# Patient Record
Sex: FEMALE
[De-identification: names, ages, dates, MRNs, and addresses within clinical notes are randomized per-mention and may not be internally consistent; named-entity substitution may affect disease eponyms.]

---

## 2020-05-02 ENCOUNTER — TRANSCRIPTION ENCOUNTER (OUTPATIENT)
Age: 65
End: 2020-05-02

## 2022-10-18 ENCOUNTER — EMERGENCY (EMERGENCY)
Facility: HOSPITAL | Age: 67
LOS: 1 days | Discharge: HOME | End: 2022-10-18
Attending: EMERGENCY MEDICINE
Payer: COMMERCIAL

## 2022-10-18 VITALS
HEART RATE: 99 BPM | SYSTOLIC BLOOD PRESSURE: 170 MMHG | TEMPERATURE: 98 F | DIASTOLIC BLOOD PRESSURE: 68 MMHG | RESPIRATION RATE: 16 BRPM | OXYGEN SATURATION: 100 %

## 2022-10-18 VITALS
DIASTOLIC BLOOD PRESSURE: 85 MMHG | HEART RATE: 110 BPM | HEIGHT: 62 IN | TEMPERATURE: 98 F | SYSTOLIC BLOOD PRESSURE: 123 MMHG | WEIGHT: 240.08 LBS | RESPIRATION RATE: 20 BRPM | OXYGEN SATURATION: 98 %

## 2022-10-18 PROCEDURE — 73070 X-RAY EXAM OF ELBOW: CPT

## 2022-10-18 PROCEDURE — 73590 X-RAY EXAM OF LOWER LEG: CPT

## 2022-10-18 PROCEDURE — 71250 CT THORAX DX C-: CPT | Mod: MA

## 2022-10-18 PROCEDURE — 73610 X-RAY EXAM OF ANKLE: CPT | Mod: 26,LT

## 2022-10-18 PROCEDURE — 73060 X-RAY EXAM OF HUMERUS: CPT

## 2022-10-18 PROCEDURE — 73610 X-RAY EXAM OF ANKLE: CPT

## 2022-10-18 PROCEDURE — 71250 CT THORAX DX C-: CPT | Mod: 26,MA

## 2022-10-18 PROCEDURE — 99284 EMERGENCY DEPT VISIT MOD MDM: CPT | Mod: 25

## 2022-10-18 PROCEDURE — 73060 X-RAY EXAM OF HUMERUS: CPT | Mod: 26,RT

## 2022-10-18 PROCEDURE — 73590 X-RAY EXAM OF LOWER LEG: CPT | Mod: 26,LT

## 2022-10-18 PROCEDURE — 73030 X-RAY EXAM OF SHOULDER: CPT | Mod: 26,RT

## 2022-10-18 PROCEDURE — 73070 X-RAY EXAM OF ELBOW: CPT | Mod: 26,RT

## 2022-10-18 PROCEDURE — 73030 X-RAY EXAM OF SHOULDER: CPT

## 2022-10-18 PROCEDURE — 99285 EMERGENCY DEPT VISIT HI MDM: CPT

## 2022-10-18 NOTE — ED PROVIDER NOTE - OBJECTIVE STATEMENT
66-year-old female past medical history of diabetes, hypertension, hyperlipidemia presents to the ED following a fall down 15 wooden stairs 8 days ago resulting in right proximal humeral fracture, left fibular fracture.  Patient was seen at [facility] after the fall.  Patient complaining now of worsening pain and her legs with numbness to bilateral lower extremities has been present since the fall.  Patient also having right lateral chest wall pain.  Denies fever, shortness of breath, chest pain, abdominal pain, bowel or bladder incontinence, back pain, neck pain, dysuria, nausea, vomiting, diarrhea. Pt not on AC. Patient had negative head CT at that time.

## 2022-10-18 NOTE — ED PROVIDER NOTE - NS ED ROS FT
GENERAL: No fever, chills  EYES: no vision changes, no discharge.   ENT: no difficulty swallowing or speaking   CARDIAC: no chest pain/pressure, SOB, lower extremity swelling  PULMONARY: no cough, SOB  GI: no abdominal pain, n/v/d  : no dysuria, no hematuria  SKIN: no rashes, no ecchymosis  NEURO: no headache, lightheadedness  MSK: + joint pain, myalgia, no weakness.

## 2022-10-18 NOTE — ED PROVIDER NOTE - PATIENT PORTAL LINK FT
You can access the FollowMyHealth Patient Portal offered by [facility] by registering at the following website: [email or web address]. By joining [employer]’s FollowMyHealth portal, you will also be able to view your health information using other applications (apps) compatible with our system.

## 2022-10-18 NOTE — ED PROVIDER NOTE - PHYSICAL EXAMINATION
GEN: Patient awake alert NAD.   HEENT: normocephalic, atraumatic, EOMI, no scleral icterus, moist MM  CARDIAC: RRR, S1, S2, no murmur. Right lateral chest wall tenderness  PULM: CTA B/L no wheeze, rhonchi, rales.   ABD: soft NT, ND, no rebound no guarding  MSK: Moving all extremities, no edema. 5/5 strength and full ROM in all extremities, Except right shoulder tenderness with ecchymosis and left lateral shin tenderness.    NEURO: A&Ox3, no focal neurological deficits  SKIN: Right lateral posterior shoulder ecchymosis.

## 2022-10-18 NOTE — ED PROVIDER NOTE - CARE PLAN
Principal Discharge DX:	Humerus fracture  Secondary Diagnosis:	Fibula fracture   1 Principal Discharge DX:	Humerus fracture  Goal:	right proximal humerus fracture  Secondary Diagnosis:	Fibula fracture

## 2022-10-18 NOTE — ED PROVIDER NOTE - CARE PROVIDER_API CALL
Christopher Antonio)  Orthopaedic Surgery  [address] [place] [address], [address]  [place] [address]  Phone: [phone]  Fax: [phone]  Follow Up Time: 7-10 Days

## 2022-10-18 NOTE — ED ADULT NURSE NOTE - CAS ELECT INFOMATION PROVIDED
Pt instructed to follow up with outpatient ortho, return to ED with worsening s/s. Pt verbalizes understanding./DC instructions

## 2022-10-18 NOTE — CONSULT NOTE ADULT - SUBJECTIVE AND OBJECTIVE BOX
Orthopaedic Surgery Consult Note    66yFemale c/o R shoulder pain  s/p fall down 15 stairs 10 days ago. Patient was initially seen at [facility] and found to have a R proximal humerus fracture treated non-op in a sling and a L distal fibula fracture made WBAT. She has been ambulating on LLE without difficulty since fall. Patient denies head hit or LOC. Patient denies numbness or tingling in the RUE. Patient denies any other injuries.    PMH:  DM (diabetes mellitus)    HTN (hypertension)    HLD (hyperlipidemia)      PSH:    AH:    Meds: See med rec    T(C): 36.5 (10-18-22 @ 19:09)  HR: 100 (10-18-22 @ 19:09)  BP: 165/69 (10-18-22 @ 19:09)  RR: 18 (10-18-22 @ 19:09)  SpO2: 100% (10-18-22 @ 19:09)  Wt(kg): --    Gen: NAD  Resp: Unlabored breathing  PE RUE:  Skin intact,    SILT axillary/med/rad/ulnar  +Motor AIN/PIN/Ulnar/Radial/Musc/Median,   +painless elbow/wrist ROM,   shoulder ROM limited 2/2 pain,   2+radial pulse, soft compartments.    Secondary:  Mildly TTP over L distal fibula, no increased pain with WB, no TTP over other bony landmarks, SILT BL, ROM intact BL, distal pulses palpable.    Imaging:  XR demonstrating right proximal humerus fracture    CT shows located glenohumeral joint    XR L ankle shows no widening of joint space

## 2022-10-18 NOTE — ED PROVIDER NOTE - NSFOLLOWUPINSTRUCTIONS_ED_ALL_ED_FT
You were seen in the ER for fall. Your Xray showed stable fractures. The orthopedic team evaluated you and advised that you follow up with Dr. Antonio in 1 week. Please do not use your injured arm. You can weight bear as tolerated on the leg.    Please follow up with Dr. Antonio in 1 week.  Please follow up with your primary care doctor.    Please return to the ER if you have worsening symptoms including fever, chest pain, shortness of breath, abdominal pain, nausea, vomiting, diarrhea, weakness or lightheadedness/fainting.

## 2022-10-18 NOTE — ED PROVIDER NOTE - PROGRESS NOTE DETAILS
John Hein, DO PGY-2: Spoke with Ortho about patient's fractures.  Would like dedicated ankle films of the left ankle.  Ortho will come see the patient to further evaluate fractures and set up patient with follow-up. Haily chadwick evaluated pt, cleared her for discharge. pt feels stable. discussed f/u with Dr. Antonio in 1w, pt agrees. return precautions given.

## 2022-10-18 NOTE — ED PROVIDER NOTE - ATTENDING CONTRIBUTION TO CARE
Tavon Hand MD, FACEP  The patient was serially evaluated throughout emergency department course by the team. There was no acute deterioration up to this time in the emergency department. The patient has demonstrated clinical improvement and/or stability, feels better at this time according to emergency department team. Agree with goals/plan of emergency department care as described in this physician's electronic medical record, including diagnostics, therapeutics and consultation recommendation as clinically warranted. Will discharge home with close outpatient follow up with primary care physician/provider and specialist if necessary. The patient and/or family was educated on expectant management and return precautions concerning signs and features to return to the emergency department, in layman terms, including but not limited to: nausea, vomiting, fever, chills, the inability to eat, take medications, or drink, persistent/worsening symptoms or any concerns at all. There are no acute or immediate life threatening issues present on history, clinical exam, or any diagnostic evaluation. The patient is a safe disposition home, has capacity and insight into their condition, is ambulatory in the Emergency Department with no further questions and will follow up with their doctor(s) this week. Diagnosis, prognosis, natural history and treatment was discussed with patient and/or family. The patient and/or family were given the opportunity to ask questions and have them answered in full. The patient and/or family are with capacity and insight into the situation, treatment, risks, benefits, alternative therapies, and understand that they can ask any further questions if needed. Patient and/or family/guardian understands anticipatory guidance and was given strict return and follow up precautions. The patient and/or family/guardian has been informed of the necessity to follow up with the PMD/Clinic/follow up as provided within 2-3 days, and the patient and/or family/guardian reports understanding of above with capacity and insight. The patient and/or family/guardian were informed of any results of their tests and are were encouraged to follow up on the findings with their doctor as well as the need to inform their doctor of any results. The patient and/or family/guardian are aware of the need to follow up with repeat testing as applicable and report understanding of the above with capacity and insight. The patient and/or family/guardian was made aware of any pending test results at the time of discharge and of the need to call back for the final results as well as the need to inform their doctor of the results.

## 2022-10-18 NOTE — ED PROVIDER NOTE - CLINICAL SUMMARY MEDICAL DECISION MAKING FREE TEXT BOX
John Hein,  PGY-2: 66-year-old female with past medical history of diabetes hypertension, hyperlipidemia presents to the ED complaining of worsening pain following a fall down 15 steps 8 days ago resulting in right humeral head fracture and left fibular fracture.  Patient now having right chest wall pain and has not been wearing her right arm sling effectively.  Will obtain CT chest to evaluate for rib fractures and italia-ray the right upper extremity and left lower extremity to make sure fractures are stable.

## 2023-03-01 PROBLEM — I10 ESSENTIAL (PRIMARY) HYPERTENSION: Chronic | Status: ACTIVE | Noted: 2022-10-18

## 2023-03-01 PROBLEM — E11.9 TYPE 2 DIABETES MELLITUS WITHOUT COMPLICATIONS: Chronic | Status: ACTIVE | Noted: 2022-10-18

## 2023-03-01 PROBLEM — E78.5 HYPERLIPIDEMIA, UNSPECIFIED: Chronic | Status: ACTIVE | Noted: 2022-10-18

## 2023-03-03 ENCOUNTER — APPOINTMENT (OUTPATIENT)
Dept: ORTHOPEDIC SURGERY | Facility: CLINIC | Age: 68
End: 2023-03-03

## 2023-03-03 PROBLEM — Z00.00 ENCOUNTER FOR PREVENTIVE HEALTH EXAMINATION: Status: ACTIVE | Noted: 2023-03-03

## 2023-03-20 ENCOUNTER — APPOINTMENT (OUTPATIENT)
Dept: ORTHOPEDIC SURGERY | Facility: CLINIC | Age: 68
End: 2023-03-20
Payer: COMMERCIAL

## 2023-03-20 VITALS
WEIGHT: 240 LBS | BODY MASS INDEX: 45.31 KG/M2 | HEART RATE: 102 BPM | SYSTOLIC BLOOD PRESSURE: 183 MMHG | DIASTOLIC BLOOD PRESSURE: 80 MMHG | OXYGEN SATURATION: 98 % | TEMPERATURE: 97.4 F | HEIGHT: 61 IN

## 2023-03-20 DIAGNOSIS — M47.816 SPONDYLOSIS W/OUT MYELOPATHY OR RADICULOPATHY, LUMBAR REGION: ICD-10-CM

## 2023-03-20 DIAGNOSIS — R29.898 OTHER SYMPTOMS AND SIGNS INVOLVING THE MUSCULOSKELETAL SYSTEM: ICD-10-CM

## 2023-03-20 PROCEDURE — 73521 X-RAY EXAM HIPS BI 2 VIEWS: CPT

## 2023-03-20 PROCEDURE — 72080 X-RAY EXAM THORACOLMB 2/> VW: CPT

## 2023-03-20 PROCEDURE — 99204 OFFICE O/P NEW MOD 45 MIN: CPT

## 2023-03-20 RX ORDER — CLOPIDOGREL 75 MG/1
TABLET, FILM COATED ORAL
Refills: 0 | Status: ACTIVE | COMMUNITY

## 2023-03-20 RX ORDER — INSULIN LISPRO 100 [IU]/ML
INJECTION, SOLUTION INTRAVENOUS; SUBCUTANEOUS
Refills: 0 | Status: ACTIVE | COMMUNITY

## 2023-03-20 RX ORDER — LOSARTAN POTASSIUM 100 MG/1
TABLET, FILM COATED ORAL
Refills: 0 | Status: ACTIVE | COMMUNITY

## 2023-03-20 RX ORDER — INSULIN GLARGINE 100 [IU]/ML
INJECTION, SOLUTION SUBCUTANEOUS
Refills: 0 | Status: ACTIVE | COMMUNITY

## 2023-03-20 RX ORDER — CARVEDILOL 12.5 MG/1
12.5 TABLET, FILM COATED ORAL
Refills: 0 | Status: ACTIVE | COMMUNITY

## 2023-03-20 NOTE — PHYSICAL EXAM
[Slightly Antalgic] : slightly antalgic [Walker] : ambulates with walker [1+] : left 1+ [Poor Appearance] : well-appearing [Acute Distress] : not in acute distress [Obese] : obese [de-identified] : The patient has no respiratory distress. Mood and affect are normal. The patient is alert and oriented to person, place and time.\par The patient has mild tenderness to the left of the midline in the lower lumbar spine.  She has lumbar spine flexion of 40 degrees limited by pain.  There is no muscle spasm.  There is no tenderness of cervical or thoracic spine.  Lower extremity neurologic exam demonstrates globally decreased sensation in both lower extremities.  Motor function is 5/5.  Deep tendon reflexes trace and equal at the knees and at the ankles.  She has venous stasis changes bilaterally.  She has healing abrasions bilaterally of her legs. [de-identified] : AP and lateral x-rays of the thoraco lumbar spine taken today demonstrate degenerative changes.  There are no fractures or dislocations.  AP and lateral x-rays of both hips taken today demonstrate no fracture or dislocation.  There are mild degenerative changes.\par MRI of the cervical spine January 16, 2023 demonstrates multilevel spondylosis without significant neural impingement\par MRI of the lumbar spine January 16, 2023 demonstrates multilevel spondylosis without significant neural impingement.\par X-rays of the right shoulder taken January 13, 2023 demonstrate healing fracture of the proximal right humerus

## 2023-03-20 NOTE — DISCUSSION/SUMMARY
[de-identified] : The patient has cervical spondylosis and lumbar spondylosis.  She has global lower extremity weakness.  I think she needs to be evaluated by the neurology service.  She will continue to use a walker as she has been falling multiple times.

## 2023-03-20 NOTE — HISTORY OF PRESENT ILLNESS
[de-identified] : 67 year old female presents for initial evaluation of bilateral lower extremity weakness and numbness for the past 5 months. She reports falling down stairs at the initial time of injury. She complains of mild pain but mostly weakness, N+T in the BLE, L>R, with inability to walk for prolonged periods. Her symptoms have been worsening since onset, and she has become more limited in the distances she is able to walk. She now walks with the assistance of a walker due to her leg weakness. She has been evaluated by Dr. Polo from [facility] and [facility] who ordered MRI of C + L spine, which demonstrated some mild stenosis but did not explain her weakness. She has also seen Dr. Ochoa who ordered an MRI of her T spine to rule out cord compression which has not yet been authorized by her insurance. She has not been seen for neuro evaluation as of yet.

## 2023-04-27 ENCOUNTER — APPOINTMENT (OUTPATIENT)
Dept: NEUROLOGY | Facility: CLINIC | Age: 68
End: 2023-04-27

## 2023-07-17 ENCOUNTER — APPOINTMENT (OUTPATIENT)
Dept: NEUROLOGY | Facility: CLINIC | Age: 68
End: 2023-07-17

## 2024-11-06 ENCOUNTER — APPOINTMENT (OUTPATIENT)
Dept: UROLOGY | Facility: CLINIC | Age: 69
End: 2024-11-06
Payer: MEDICARE

## 2024-11-06 ENCOUNTER — NON-APPOINTMENT (OUTPATIENT)
Age: 69
End: 2024-11-06

## 2024-11-06 VITALS — WEIGHT: 210 LBS | BODY MASS INDEX: 38.64 KG/M2 | HEIGHT: 62 IN

## 2024-11-06 DIAGNOSIS — N28.9 DISORDER OF KIDNEY AND URETER, UNSPECIFIED: ICD-10-CM

## 2024-11-06 DIAGNOSIS — Z78.9 OTHER SPECIFIED HEALTH STATUS: ICD-10-CM

## 2024-11-06 DIAGNOSIS — Z83.3 FAMILY HISTORY OF DIABETES MELLITUS: ICD-10-CM

## 2024-11-06 PROCEDURE — 99204 OFFICE O/P NEW MOD 45 MIN: CPT

## 2024-11-06 RX ORDER — NIFEDIPINE 30 MG/1
30 TABLET, EXTENDED RELEASE ORAL
Refills: 0 | Status: ACTIVE | COMMUNITY

## 2024-11-06 RX ORDER — EPOETIN ALFA 10000 [IU]/ML
10000 SOLUTION INTRAVENOUS; SUBCUTANEOUS
Refills: 0 | Status: ACTIVE | COMMUNITY

## 2024-11-06 RX ORDER — ACETAMINOPHEN 500 MG/1
TABLET ORAL
Refills: 0 | Status: ACTIVE | COMMUNITY

## 2024-11-06 RX ORDER — BUMETANIDE 2 MG/1
2 TABLET ORAL
Refills: 0 | Status: ACTIVE | COMMUNITY

## 2024-11-07 PROBLEM — N28.9 KIDNEY LESION: Status: ACTIVE | Noted: 2024-11-07

## 2024-11-08 ENCOUNTER — NON-APPOINTMENT (OUTPATIENT)
Age: 69
End: 2024-11-08

## 2024-11-14 ENCOUNTER — APPOINTMENT (OUTPATIENT)
Dept: UROLOGY | Facility: CLINIC | Age: 69
End: 2024-11-14
Payer: MEDICARE

## 2024-11-14 VITALS
SYSTOLIC BLOOD PRESSURE: 180 MMHG | BODY MASS INDEX: 38.64 KG/M2 | HEIGHT: 62 IN | WEIGHT: 210 LBS | DIASTOLIC BLOOD PRESSURE: 76 MMHG | RESPIRATION RATE: 16 BRPM | OXYGEN SATURATION: 99 % | TEMPERATURE: 97.6 F | HEART RATE: 76 BPM

## 2024-11-14 DIAGNOSIS — N28.89 OTHER SPECIFIED DISORDERS OF KIDNEY AND URETER: ICD-10-CM

## 2024-11-14 DIAGNOSIS — R19.00 INTRA-ABDOMINAL AND PELVIC SWELLING, MASS AND LUMP, UNSPECIFIED SITE: ICD-10-CM

## 2024-11-14 PROCEDURE — 99215 OFFICE O/P EST HI 40 MIN: CPT

## 2024-11-14 PROCEDURE — G2211 COMPLEX E/M VISIT ADD ON: CPT

## 2024-11-27 ENCOUNTER — APPOINTMENT (OUTPATIENT)
Dept: UROLOGY | Facility: CLINIC | Age: 69
End: 2024-11-27

## 2024-12-10 ENCOUNTER — APPOINTMENT (OUTPATIENT)
Dept: SURGERY | Facility: CLINIC | Age: 69
End: 2024-12-10
Payer: MEDICARE

## 2024-12-10 VITALS
BODY MASS INDEX: 38.28 KG/M2 | SYSTOLIC BLOOD PRESSURE: 140 MMHG | HEIGHT: 62 IN | WEIGHT: 208 LBS | DIASTOLIC BLOOD PRESSURE: 68 MMHG | HEART RATE: 86 BPM | OXYGEN SATURATION: 99 %

## 2024-12-10 DIAGNOSIS — R19.00 INTRA-ABDOMINAL AND PELVIC SWELLING, MASS AND LUMP, UNSPECIFIED SITE: ICD-10-CM

## 2024-12-10 DIAGNOSIS — E11.29 TYPE 2 DIABETES MELLITUS WITH OTHER DIABETIC KIDNEY COMPLICATION: ICD-10-CM

## 2024-12-10 DIAGNOSIS — Z86.79 PERSONAL HISTORY OF OTHER DISEASES OF THE CIRCULATORY SYSTEM: ICD-10-CM

## 2024-12-10 DIAGNOSIS — Z87.448 PERSONAL HISTORY OF OTHER DISEASES OF URINARY SYSTEM: ICD-10-CM

## 2024-12-10 PROCEDURE — 99205 OFFICE O/P NEW HI 60 MIN: CPT

## 2024-12-12 ENCOUNTER — RESULT REVIEW (OUTPATIENT)
Age: 69
End: 2024-12-12

## 2024-12-12 ENCOUNTER — OUTPATIENT (OUTPATIENT)
Dept: OUTPATIENT SERVICES | Facility: HOSPITAL | Age: 69
LOS: 1 days | End: 2024-12-12
Payer: MEDICARE

## 2024-12-12 DIAGNOSIS — N28.89 OTHER SPECIFIED DISORDERS OF KIDNEY AND URETER: ICD-10-CM

## 2024-12-12 DIAGNOSIS — R19.00 INTRA-ABDOMINAL AND PELVIC SWELLING, MASS AND LUMP, UNSPECIFIED SITE: ICD-10-CM

## 2024-12-12 DIAGNOSIS — Z00.8 ENCOUNTER FOR OTHER GENERAL EXAMINATION: ICD-10-CM

## 2024-12-12 LAB — GLUCOSE BLDC GLUCOMTR-MCNC: 72 MG/DL — SIGNIFICANT CHANGE UP (ref 70–99)

## 2024-12-12 PROCEDURE — 82962 GLUCOSE BLOOD TEST: CPT

## 2024-12-12 PROCEDURE — 78815 PET IMAGE W/CT SKULL-THIGH: CPT | Mod: PI

## 2024-12-12 PROCEDURE — 78815 PET IMAGE W/CT SKULL-THIGH: CPT | Mod: 26,PI,MH

## 2024-12-12 PROCEDURE — A9552: CPT

## 2024-12-13 ENCOUNTER — NON-APPOINTMENT (OUTPATIENT)
Age: 69
End: 2024-12-13

## 2024-12-13 DIAGNOSIS — N28.89 OTHER SPECIFIED DISORDERS OF KIDNEY AND URETER: ICD-10-CM

## 2024-12-13 DIAGNOSIS — R19.00 INTRA-ABDOMINAL AND PELVIC SWELLING, MASS AND LUMP, UNSPECIFIED SITE: ICD-10-CM

## 2024-12-20 ENCOUNTER — OUTPATIENT (OUTPATIENT)
Dept: OUTPATIENT SERVICES | Facility: HOSPITAL | Age: 69
LOS: 1 days | End: 2024-12-20
Payer: MEDICARE

## 2024-12-20 VITALS
HEIGHT: 62 IN | TEMPERATURE: 98 F | HEART RATE: 79 BPM | DIASTOLIC BLOOD PRESSURE: 75 MMHG | RESPIRATION RATE: 17 BRPM | SYSTOLIC BLOOD PRESSURE: 134 MMHG | WEIGHT: 207.01 LBS | OXYGEN SATURATION: 100 %

## 2024-12-20 DIAGNOSIS — Z95.5 PRESENCE OF CORONARY ANGIOPLASTY IMPLANT AND GRAFT: Chronic | ICD-10-CM

## 2024-12-20 DIAGNOSIS — Z98.891 HISTORY OF UTERINE SCAR FROM PREVIOUS SURGERY: Chronic | ICD-10-CM

## 2024-12-20 DIAGNOSIS — Z01.818 ENCOUNTER FOR OTHER PREPROCEDURAL EXAMINATION: ICD-10-CM

## 2024-12-20 DIAGNOSIS — Z98.890 OTHER SPECIFIED POSTPROCEDURAL STATES: Chronic | ICD-10-CM

## 2024-12-20 DIAGNOSIS — R19.00 INTRA-ABDOMINAL AND PELVIC SWELLING, MASS AND LUMP, UNSPECIFIED SITE: ICD-10-CM

## 2024-12-20 LAB
A1C WITH ESTIMATED AVERAGE GLUCOSE RESULT: 5.5 % — SIGNIFICANT CHANGE UP (ref 4–5.6)
ALBUMIN SERPL ELPH-MCNC: 4 G/DL — SIGNIFICANT CHANGE UP (ref 3.5–5.2)
ALP SERPL-CCNC: 81 U/L — SIGNIFICANT CHANGE UP (ref 30–115)
ALT FLD-CCNC: <5 U/L — SIGNIFICANT CHANGE UP (ref 0–41)
ANION GAP SERPL CALC-SCNC: 16 MMOL/L — HIGH (ref 7–14)
APTT BLD: 33.1 SEC — SIGNIFICANT CHANGE UP (ref 27–39.2)
AST SERPL-CCNC: 10 U/L — SIGNIFICANT CHANGE UP (ref 0–41)
BILIRUB SERPL-MCNC: 0.3 MG/DL — SIGNIFICANT CHANGE UP (ref 0.2–1.2)
BUN SERPL-MCNC: 91 MG/DL — CRITICAL HIGH (ref 10–20)
CALCIUM SERPL-MCNC: 8.6 MG/DL — SIGNIFICANT CHANGE UP (ref 8.4–10.5)
CHLORIDE SERPL-SCNC: 109 MMOL/L — SIGNIFICANT CHANGE UP (ref 98–110)
CO2 SERPL-SCNC: 10 MMOL/L — LOW (ref 17–32)
CREAT SERPL-MCNC: 6.6 MG/DL — CRITICAL HIGH (ref 0.7–1.5)
EGFR: 6 ML/MIN/1.73M2 — LOW
ESTIMATED AVERAGE GLUCOSE: 111 MG/DL — SIGNIFICANT CHANGE UP (ref 68–114)
GLUCOSE SERPL-MCNC: 93 MG/DL — SIGNIFICANT CHANGE UP (ref 70–99)
HCT VFR BLD CALC: 40.1 % — SIGNIFICANT CHANGE UP (ref 37–47)
HGB BLD-MCNC: 12.1 G/DL — SIGNIFICANT CHANGE UP (ref 12–16)
INR BLD: 1.13 RATIO — SIGNIFICANT CHANGE UP (ref 0.65–1.3)
MCHC RBC-ENTMCNC: 24.8 PG — LOW (ref 27–31)
MCHC RBC-ENTMCNC: 30.2 G/DL — LOW (ref 32–37)
MCV RBC AUTO: 82.2 FL — SIGNIFICANT CHANGE UP (ref 81–99)
NRBC # BLD: 0 /100 WBCS — SIGNIFICANT CHANGE UP (ref 0–0)
PLATELET # BLD AUTO: 289 K/UL — SIGNIFICANT CHANGE UP (ref 130–400)
PMV BLD: 11 FL — HIGH (ref 7.4–10.4)
POTASSIUM SERPL-MCNC: 5.7 MMOL/L — HIGH (ref 3.5–5)
POTASSIUM SERPL-SCNC: 5.7 MMOL/L — HIGH (ref 3.5–5)
PROT SERPL-MCNC: 7.5 G/DL — SIGNIFICANT CHANGE UP (ref 6–8)
PROTHROM AB SERPL-ACNC: 13.4 SEC — HIGH (ref 9.95–12.87)
RBC # BLD: 4.88 M/UL — SIGNIFICANT CHANGE UP (ref 4.2–5.4)
RBC # FLD: 17.4 % — HIGH (ref 11.5–14.5)
SODIUM SERPL-SCNC: 135 MMOL/L — SIGNIFICANT CHANGE UP (ref 135–146)
WBC # BLD: 6.88 K/UL — SIGNIFICANT CHANGE UP (ref 4.8–10.8)
WBC # FLD AUTO: 6.88 K/UL — SIGNIFICANT CHANGE UP (ref 4.8–10.8)

## 2024-12-20 PROCEDURE — 99214 OFFICE O/P EST MOD 30 MIN: CPT | Mod: 25

## 2024-12-20 PROCEDURE — 83036 HEMOGLOBIN GLYCOSYLATED A1C: CPT

## 2024-12-20 PROCEDURE — 93010 ELECTROCARDIOGRAM REPORT: CPT

## 2024-12-20 PROCEDURE — 85730 THROMBOPLASTIN TIME PARTIAL: CPT

## 2024-12-20 PROCEDURE — 85610 PROTHROMBIN TIME: CPT

## 2024-12-20 PROCEDURE — 80053 COMPREHEN METABOLIC PANEL: CPT

## 2024-12-20 PROCEDURE — 93005 ELECTROCARDIOGRAM TRACING: CPT

## 2024-12-20 PROCEDURE — 36415 COLL VENOUS BLD VENIPUNCTURE: CPT

## 2024-12-20 PROCEDURE — 85027 COMPLETE CBC AUTOMATED: CPT

## 2024-12-20 NOTE — H&P PST ADULT - ENMT COMMENTS
Massage Therapy Progress Note      Length of treatment: 30-minute    Authorization: Patient request    Reviewed contraindications/current health status with Patient    No Contraindications to massage therapy exist    Subjective:  Patient complains of:  No complaints today.    Pain report prior to treatment:  Patient gave no response    Type of treatment requested: Therapeutic massage    Specific requests:  Upper body face down worked her whole back and gluteus, and while face up did her arms, neck and shoulders.    Objective Observations:  Hypertonicity noted in:  Cervical paraspinals Bilateral, Upper trapezius Bilateral, Lumbar paraspinals Bilateral, Quadratus lumborum Bilateral and Gluteus medius Bilateral, rhomboids bilateral.    Hypotonicity/Ischemia noted in: none noted.    Decreased ROM noted in none noted.    Additional observations:  Rounded shoulders and pronounced kyphosis and lordosis.    Assessment:  Patient received Swedish techniques to affected tissues.    Additional treatment provided:  Therapist applied warm towel to patients back concluding massage.  Therapist provided a warm neck roll while patient lay face up.  therapist provided patient a warm towel and a cup of water at the conclusion of massage session.    Response to treatment: Patient relaxed easily    Pain report after treatment:Patient gave no response    Education/Resources: None provided this visit    Plan/Recommendations:  Massage therapy as desired  Session concluded   III airway/ denies loose or removable teeth

## 2024-12-20 NOTE — H&P PST ADULT - NSICDXPASTMEDICALHX_GEN_ALL_CORE_FT
PAST MEDICAL HISTORY:  DM (diabetes mellitus)     HLD (hyperlipidemia)     HTN (hypertension)     Obese

## 2024-12-21 DIAGNOSIS — Z01.818 ENCOUNTER FOR OTHER PREPROCEDURAL EXAMINATION: ICD-10-CM

## 2024-12-21 DIAGNOSIS — R19.00 INTRA-ABDOMINAL AND PELVIC SWELLING, MASS AND LUMP, UNSPECIFIED SITE: ICD-10-CM

## 2024-12-27 ENCOUNTER — INPATIENT (INPATIENT)
Facility: HOSPITAL | Age: 69
LOS: 5 days | Discharge: HOME | DRG: 683 | End: 2025-01-02
Attending: HOSPITALIST | Admitting: INTERNAL MEDICINE
Payer: MEDICARE

## 2024-12-27 ENCOUNTER — OUTPATIENT (OUTPATIENT)
Dept: OUTPATIENT SERVICES | Facility: HOSPITAL | Age: 69
LOS: 1 days | Discharge: HOME | End: 2024-12-27
Payer: MEDICARE

## 2024-12-27 VITALS
RESPIRATION RATE: 18 BRPM | TEMPERATURE: 97 F | HEART RATE: 76 BPM | SYSTOLIC BLOOD PRESSURE: 162 MMHG | OXYGEN SATURATION: 100 % | DIASTOLIC BLOOD PRESSURE: 70 MMHG | WEIGHT: 205.03 LBS | HEIGHT: 62 IN

## 2024-12-27 VITALS
OXYGEN SATURATION: 100 % | DIASTOLIC BLOOD PRESSURE: 70 MMHG | WEIGHT: 205.03 LBS | HEIGHT: 62 IN | TEMPERATURE: 97 F | SYSTOLIC BLOOD PRESSURE: 162 MMHG | HEART RATE: 76 BPM | RESPIRATION RATE: 18 BRPM

## 2024-12-27 VITALS
RESPIRATION RATE: 18 BRPM | TEMPERATURE: 98 F | HEART RATE: 71 BPM | OXYGEN SATURATION: 100 % | DIASTOLIC BLOOD PRESSURE: 79 MMHG | SYSTOLIC BLOOD PRESSURE: 145 MMHG | WEIGHT: 205.03 LBS | HEIGHT: 62 IN

## 2024-12-27 DIAGNOSIS — Z98.890 OTHER SPECIFIED POSTPROCEDURAL STATES: Chronic | ICD-10-CM

## 2024-12-27 DIAGNOSIS — Z98.891 HISTORY OF UTERINE SCAR FROM PREVIOUS SURGERY: Chronic | ICD-10-CM

## 2024-12-27 DIAGNOSIS — N18.6 END STAGE RENAL DISEASE: ICD-10-CM

## 2024-12-27 DIAGNOSIS — Z95.5 PRESENCE OF CORONARY ANGIOPLASTY IMPLANT AND GRAFT: Chronic | ICD-10-CM

## 2024-12-27 DIAGNOSIS — R19.00 INTRA-ABDOMINAL AND PELVIC SWELLING, MASS AND LUMP, UNSPECIFIED SITE: ICD-10-CM

## 2024-12-27 LAB
ALBUMIN SERPL ELPH-MCNC: 3.9 G/DL — SIGNIFICANT CHANGE UP (ref 3.5–5.2)
ALP SERPL-CCNC: 91 U/L — SIGNIFICANT CHANGE UP (ref 30–115)
ALT FLD-CCNC: 8 U/L — SIGNIFICANT CHANGE UP (ref 0–41)
ANION GAP SERPL CALC-SCNC: 16 MMOL/L — HIGH (ref 7–14)
ANION GAP SERPL CALC-SCNC: 18 MMOL/L — HIGH (ref 7–14)
AST SERPL-CCNC: 11 U/L — SIGNIFICANT CHANGE UP (ref 0–41)
BASE EXCESS BLDV CALC-SCNC: -16.1 MMOL/L — LOW (ref -2–3)
BASOPHILS # BLD AUTO: 0.03 K/UL — SIGNIFICANT CHANGE UP (ref 0–0.2)
BASOPHILS NFR BLD AUTO: 0.3 % — SIGNIFICANT CHANGE UP (ref 0–1)
BILIRUB SERPL-MCNC: 0.3 MG/DL — SIGNIFICANT CHANGE UP (ref 0.2–1.2)
BUN SERPL-MCNC: 91 MG/DL — CRITICAL HIGH (ref 10–20)
BUN SERPL-MCNC: 94 MG/DL — CRITICAL HIGH (ref 10–20)
CA-I SERPL-SCNC: 1.14 MMOL/L — LOW (ref 1.15–1.33)
CALCIUM SERPL-MCNC: 7.4 MG/DL — LOW (ref 8.4–10.4)
CALCIUM SERPL-MCNC: 8.2 MG/DL — LOW (ref 8.4–10.5)
CHLORIDE SERPL-SCNC: 102 MMOL/L — SIGNIFICANT CHANGE UP (ref 98–110)
CHLORIDE SERPL-SCNC: 104 MMOL/L — SIGNIFICANT CHANGE UP (ref 98–110)
CO2 SERPL-SCNC: 10 MMOL/L — LOW (ref 17–32)
CO2 SERPL-SCNC: 11 MMOL/L — LOW (ref 17–32)
CREAT SERPL-MCNC: 6.7 MG/DL — CRITICAL HIGH (ref 0.7–1.5)
CREAT SERPL-MCNC: 6.9 MG/DL — CRITICAL HIGH (ref 0.7–1.5)
EGFR: 6 ML/MIN/1.73M2 — LOW
EGFR: 6 ML/MIN/1.73M2 — LOW
EOSINOPHIL # BLD AUTO: 0.09 K/UL — SIGNIFICANT CHANGE UP (ref 0–0.7)
EOSINOPHIL NFR BLD AUTO: 1 % — SIGNIFICANT CHANGE UP (ref 0–8)
GAS PNL BLDV: 129 MMOL/L — LOW (ref 136–145)
GAS PNL BLDV: SIGNIFICANT CHANGE UP
GAS PNL BLDV: SIGNIFICANT CHANGE UP
GLUCOSE BLDC GLUCOMTR-MCNC: 162 MG/DL — HIGH (ref 70–99)
GLUCOSE BLDC GLUCOMTR-MCNC: 74 MG/DL — SIGNIFICANT CHANGE UP (ref 70–99)
GLUCOSE BLDC GLUCOMTR-MCNC: 97 MG/DL — SIGNIFICANT CHANGE UP (ref 70–99)
GLUCOSE SERPL-MCNC: 173 MG/DL — HIGH (ref 70–99)
GLUCOSE SERPL-MCNC: 92 MG/DL — SIGNIFICANT CHANGE UP (ref 70–99)
HCO3 BLDV-SCNC: 11 MMOL/L — LOW (ref 22–29)
HCT VFR BLD CALC: 40.2 % — SIGNIFICANT CHANGE UP (ref 37–47)
HCT VFR BLDA CALC: 35 % — SIGNIFICANT CHANGE UP (ref 34.5–46.5)
HGB BLD CALC-MCNC: 11.8 G/DL — SIGNIFICANT CHANGE UP (ref 11.7–16.1)
HGB BLD-MCNC: 12.2 G/DL — SIGNIFICANT CHANGE UP (ref 12–16)
IMM GRANULOCYTES NFR BLD AUTO: 0.2 % — SIGNIFICANT CHANGE UP (ref 0.1–0.3)
LACTATE BLDV-MCNC: 0.6 MMOL/L — SIGNIFICANT CHANGE UP (ref 0.5–2)
LYMPHOCYTES # BLD AUTO: 1.51 K/UL — SIGNIFICANT CHANGE UP (ref 1.2–3.4)
LYMPHOCYTES # BLD AUTO: 17.5 % — LOW (ref 20.5–51.1)
MAGNESIUM SERPL-MCNC: 2.4 MG/DL — SIGNIFICANT CHANGE UP (ref 1.8–2.4)
MCHC RBC-ENTMCNC: 24.7 PG — LOW (ref 27–31)
MCHC RBC-ENTMCNC: 30.3 G/DL — LOW (ref 32–37)
MCV RBC AUTO: 81.5 FL — SIGNIFICANT CHANGE UP (ref 81–99)
MONOCYTES # BLD AUTO: 0.76 K/UL — HIGH (ref 0.1–0.6)
MONOCYTES NFR BLD AUTO: 8.8 % — SIGNIFICANT CHANGE UP (ref 1.7–9.3)
NEUTROPHILS # BLD AUTO: 6.22 K/UL — SIGNIFICANT CHANGE UP (ref 1.4–6.5)
NEUTROPHILS NFR BLD AUTO: 72.2 % — SIGNIFICANT CHANGE UP (ref 42.2–75.2)
NRBC # BLD: 0 /100 WBCS — SIGNIFICANT CHANGE UP (ref 0–0)
PCO2 BLDV: 32 MMHG — LOW (ref 39–42)
PH BLDV: 7.16 — CRITICAL LOW (ref 7.32–7.43)
PHOSPHATE SERPL-MCNC: 6 MG/DL — HIGH (ref 2.1–4.9)
PLATELET # BLD AUTO: 236 K/UL — SIGNIFICANT CHANGE UP (ref 130–400)
PMV BLD: 11.7 FL — HIGH (ref 7.4–10.4)
PO2 BLDV: 30 MMHG — SIGNIFICANT CHANGE UP (ref 25–45)
POTASSIUM BLDV-SCNC: 5.9 MMOL/L — HIGH (ref 3.5–5.1)
POTASSIUM SERPL-MCNC: 5.2 MMOL/L — HIGH (ref 3.5–5)
POTASSIUM SERPL-MCNC: 5.4 MMOL/L — HIGH (ref 3.5–5)
POTASSIUM SERPL-SCNC: 5.2 MMOL/L — HIGH (ref 3.5–5)
POTASSIUM SERPL-SCNC: 5.4 MMOL/L — HIGH (ref 3.5–5)
PROT SERPL-MCNC: 7.3 G/DL — SIGNIFICANT CHANGE UP (ref 6–8)
RBC # BLD: 4.93 M/UL — SIGNIFICANT CHANGE UP (ref 4.2–5.4)
RBC # FLD: 18.1 % — HIGH (ref 11.5–14.5)
SAO2 % BLDV: 46.5 % — LOW (ref 67–88)
SODIUM SERPL-SCNC: 130 MMOL/L — LOW (ref 135–146)
SODIUM SERPL-SCNC: 131 MMOL/L — LOW (ref 135–146)
URATE SERPL-MCNC: 9.3 MG/DL — HIGH (ref 2.5–7)
WBC # BLD: 8.63 K/UL — SIGNIFICANT CHANGE UP (ref 4.8–10.8)
WBC # FLD AUTO: 8.63 K/UL — SIGNIFICANT CHANGE UP (ref 4.8–10.8)

## 2024-12-27 PROCEDURE — 88342 IMHCHEM/IMCYTCHM 1ST ANTB: CPT

## 2024-12-27 PROCEDURE — 88305 TISSUE EXAM BY PATHOLOGIST: CPT

## 2024-12-27 PROCEDURE — 84295 ASSAY OF SERUM SODIUM: CPT

## 2024-12-27 PROCEDURE — 82306 VITAMIN D 25 HYDROXY: CPT

## 2024-12-27 PROCEDURE — 99222 1ST HOSP IP/OBS MODERATE 55: CPT

## 2024-12-27 PROCEDURE — 36415 COLL VENOUS BLD VENIPUNCTURE: CPT

## 2024-12-27 PROCEDURE — 85027 COMPLETE CBC AUTOMATED: CPT

## 2024-12-27 PROCEDURE — 86901 BLOOD TYPING SEROLOGIC RH(D): CPT

## 2024-12-27 PROCEDURE — 85018 HEMOGLOBIN: CPT

## 2024-12-27 PROCEDURE — 88341 IMHCHEM/IMCYTCHM EA ADD ANTB: CPT

## 2024-12-27 PROCEDURE — 99285 EMERGENCY DEPT VISIT HI MDM: CPT | Mod: GC

## 2024-12-27 PROCEDURE — 84132 ASSAY OF SERUM POTASSIUM: CPT

## 2024-12-27 PROCEDURE — 80074 ACUTE HEPATITIS PANEL: CPT

## 2024-12-27 PROCEDURE — 83970 ASSAY OF PARATHORMONE: CPT

## 2024-12-27 PROCEDURE — 85730 THROMBOPLASTIN TIME PARTIAL: CPT

## 2024-12-27 PROCEDURE — 82803 BLOOD GASES ANY COMBINATION: CPT

## 2024-12-27 PROCEDURE — 85014 HEMATOCRIT: CPT

## 2024-12-27 PROCEDURE — 71045 X-RAY EXAM CHEST 1 VIEW: CPT

## 2024-12-27 PROCEDURE — 49180 BIOPSY ABDOMINAL MASS: CPT

## 2024-12-27 PROCEDURE — 85025 COMPLETE CBC W/AUTO DIFF WBC: CPT

## 2024-12-27 PROCEDURE — 86900 BLOOD TYPING SEROLOGIC ABO: CPT

## 2024-12-27 PROCEDURE — 82330 ASSAY OF CALCIUM: CPT

## 2024-12-27 PROCEDURE — 80053 COMPREHEN METABOLIC PANEL: CPT

## 2024-12-27 PROCEDURE — 77012 CT SCAN FOR NEEDLE BIOPSY: CPT

## 2024-12-27 PROCEDURE — 76770 US EXAM ABDO BACK WALL COMP: CPT | Mod: 26

## 2024-12-27 PROCEDURE — 83605 ASSAY OF LACTIC ACID: CPT

## 2024-12-27 PROCEDURE — 82962 GLUCOSE BLOOD TEST: CPT

## 2024-12-27 PROCEDURE — 80048 BASIC METABOLIC PNL TOTAL CA: CPT

## 2024-12-27 PROCEDURE — 82310 ASSAY OF CALCIUM: CPT

## 2024-12-27 PROCEDURE — 76770 US EXAM ABDO BACK WALL COMP: CPT

## 2024-12-27 PROCEDURE — 84550 ASSAY OF BLOOD/URIC ACID: CPT

## 2024-12-27 PROCEDURE — 88333 PATH CONSLTJ SURG CYTO XM 1: CPT

## 2024-12-27 PROCEDURE — 85610 PROTHROMBIN TIME: CPT

## 2024-12-27 PROCEDURE — 82652 VIT D 1 25-DIHYDROXY: CPT

## 2024-12-27 PROCEDURE — 84100 ASSAY OF PHOSPHORUS: CPT

## 2024-12-27 PROCEDURE — 86850 RBC ANTIBODY SCREEN: CPT

## 2024-12-27 PROCEDURE — 83735 ASSAY OF MAGNESIUM: CPT

## 2024-12-27 RX ORDER — SODIUM CHLORIDE 9 MG/ML
1000 INJECTION, SOLUTION INTRAVENOUS ONCE
Refills: 0 | Status: COMPLETED | OUTPATIENT
Start: 2024-12-27 | End: 2024-12-27

## 2024-12-27 RX ORDER — ACETAMINOPHEN 80 MG/.8ML
650 SOLUTION/ DROPS ORAL EVERY 6 HOURS
Refills: 0 | Status: DISCONTINUED | OUTPATIENT
Start: 2024-12-27 | End: 2025-01-02

## 2024-12-27 RX ORDER — SODIUM CHLORIDE 9 MG/ML
1000 INJECTION, SOLUTION INTRAVENOUS
Refills: 0 | Status: DISCONTINUED | OUTPATIENT
Start: 2024-12-27 | End: 2025-01-02

## 2024-12-27 RX ORDER — ACETAMINOPHEN 500MG 500 MG/1
2 TABLET, COATED ORAL
Refills: 0 | DISCHARGE

## 2024-12-27 RX ORDER — GLUCAGON INJECTION, SOLUTION 0.5 MG/.1ML
1 INJECTION, SOLUTION SUBCUTANEOUS ONCE
Refills: 0 | Status: DISCONTINUED | OUTPATIENT
Start: 2024-12-27 | End: 2025-01-02

## 2024-12-27 RX ORDER — INSULIN LISPRO 100/ML
VIAL (ML) SUBCUTANEOUS
Refills: 0 | Status: DISCONTINUED | OUTPATIENT
Start: 2024-12-27 | End: 2025-01-02

## 2024-12-27 RX ORDER — SODIUM ZIRCONIUM CYCLOSILICATE 10 G/10G
5 POWDER, FOR SUSPENSION ORAL ONCE
Refills: 0 | Status: COMPLETED | OUTPATIENT
Start: 2024-12-27 | End: 2024-12-27

## 2024-12-27 RX ORDER — CARVEDILOL 25 MG/1
1 TABLET, FILM COATED ORAL
Refills: 0 | DISCHARGE

## 2024-12-27 RX ORDER — DEXTROSE MONOHYDRATE 25 G/50ML
25 INJECTION, SOLUTION INTRAVENOUS ONCE
Refills: 0 | Status: DISCONTINUED | OUTPATIENT
Start: 2024-12-27 | End: 2025-01-02

## 2024-12-27 RX ORDER — NIFEDIPINE 10 MG
1 CAPSULE ORAL
Refills: 0 | DISCHARGE

## 2024-12-27 RX ORDER — INSULIN LISPRO 100/ML
4 VIAL (ML) SUBCUTANEOUS
Refills: 0 | Status: DISCONTINUED | OUTPATIENT
Start: 2024-12-27 | End: 2025-01-02

## 2024-12-27 RX ORDER — SODIUM ZIRCONIUM CYCLOSILICATE 10 G/10G
10 POWDER, FOR SUSPENSION ORAL EVERY 8 HOURS
Refills: 0 | Status: DISCONTINUED | OUTPATIENT
Start: 2024-12-27 | End: 2024-12-29

## 2024-12-27 RX ORDER — PANTOPRAZOLE 40 MG/1
40 TABLET, DELAYED RELEASE ORAL
Refills: 0 | Status: DISCONTINUED | OUTPATIENT
Start: 2024-12-27 | End: 2025-01-02

## 2024-12-27 RX ORDER — INSULIN GLARGINE-YFGN 100 [IU]/ML
12 INJECTION, SOLUTION SUBCUTANEOUS AT BEDTIME
Refills: 0 | Status: DISCONTINUED | OUTPATIENT
Start: 2024-12-27 | End: 2025-01-02

## 2024-12-27 RX ORDER — SODIUM BICARBONATE 84 MG/ML
0.05 INJECTION, SOLUTION INTRAVENOUS
Qty: 50 | Refills: 0 | Status: DISCONTINUED | OUTPATIENT
Start: 2024-12-27 | End: 2024-12-27

## 2024-12-27 RX ORDER — DEXTROSE MONOHYDRATE 25 G/50ML
12.5 INJECTION, SOLUTION INTRAVENOUS ONCE
Refills: 0 | Status: DISCONTINUED | OUTPATIENT
Start: 2024-12-27 | End: 2025-01-02

## 2024-12-27 RX ORDER — HEPARIN SODIUM 1000 [USP'U]/ML
5000 INJECTION, SOLUTION INTRAVENOUS; SUBCUTANEOUS EVERY 12 HOURS
Refills: 0 | Status: DISCONTINUED | OUTPATIENT
Start: 2024-12-27 | End: 2025-01-02

## 2024-12-27 RX ORDER — SODIUM BICARBONATE 84 MG/ML
0.05 INJECTION, SOLUTION INTRAVENOUS
Qty: 50 | Refills: 0 | Status: DISCONTINUED | OUTPATIENT
Start: 2024-12-27 | End: 2024-12-31

## 2024-12-27 RX ORDER — INSULIN GLARGINE 100 [IU]/ML
24 INJECTION, SOLUTION SUBCUTANEOUS
Refills: 0 | DISCHARGE

## 2024-12-27 RX ORDER — CARVEDILOL 25 MG/1
6.25 TABLET, FILM COATED ORAL EVERY 12 HOURS
Refills: 0 | Status: DISCONTINUED | OUTPATIENT
Start: 2024-12-27 | End: 2025-01-02

## 2024-12-27 RX ORDER — NIFEDIPINE 60 MG/1
30 TABLET, EXTENDED RELEASE ORAL DAILY
Refills: 0 | Status: DISCONTINUED | OUTPATIENT
Start: 2024-12-27 | End: 2025-01-02

## 2024-12-27 RX ORDER — DEXTROSE MONOHYDRATE 25 G/50ML
15 INJECTION, SOLUTION INTRAVENOUS ONCE
Refills: 0 | Status: DISCONTINUED | OUTPATIENT
Start: 2024-12-27 | End: 2025-01-02

## 2024-12-27 RX ADMIN — SODIUM ZIRCONIUM CYCLOSILICATE 10 GRAM(S): 10 POWDER, FOR SUSPENSION ORAL at 22:51

## 2024-12-27 RX ADMIN — HEPARIN SODIUM 5000 UNIT(S): 1000 INJECTION, SOLUTION INTRAVENOUS; SUBCUTANEOUS at 17:21

## 2024-12-27 RX ADMIN — INSULIN GLARGINE-YFGN 12 UNIT(S): 100 INJECTION, SOLUTION SUBCUTANEOUS at 21:58

## 2024-12-27 RX ADMIN — SODIUM ZIRCONIUM CYCLOSILICATE 5 GRAM(S): 10 POWDER, FOR SUSPENSION ORAL at 16:19

## 2024-12-27 RX ADMIN — SODIUM BICARBONATE 75 MEQ/KG/HR: 84 INJECTION, SOLUTION INTRAVENOUS at 17:56

## 2024-12-27 RX ADMIN — SODIUM CHLORIDE 1000 MILLILITER(S): 9 INJECTION, SOLUTION INTRAVENOUS at 10:30

## 2024-12-27 RX ADMIN — CARVEDILOL 6.25 MILLIGRAM(S): 25 TABLET, FILM COATED ORAL at 17:30

## 2024-12-27 NOTE — ED ADULT NURSE NOTE - OBJECTIVE STATEMENT
pt sent in from IR for high levels of potassium. states she was getting biopsy done of a mass in her abdomen. denies any symptoms.

## 2024-12-27 NOTE — H&P ADULT - ATTENDING COMMENTS
Medicine Attending Addendum  Patient was seen and examined with medicine team.  Nursing records reviewed. I agree with the resident/PA/NP's note including past medical history, home medications, social history, allergies, surgical history, family history, and review of system. I have reviewed relevant vitals, laboratory values, imaging studies, and microbiology.   - Above resident's note was edited by me  - rest of A/P as per detailed housestaff note above except above modifications    Total time spent to complete patient's bedside assessment, reviewed medical chart, discussed medical plan of care with team was 45 minutes with >50% of time spent face to face with patient, discussion with patient/family and/or coordination of care.    Seen and examined patient 12/27/24

## 2024-12-27 NOTE — ED PROVIDER NOTE - OBJECTIVE STATEMENT
61-year-old female past medical history of hypertension hyperlipidemia diabetes CKD for several years presents today for hyperkalemia.  Patient incidentally found a renal mass on CT several weeks ago and was referred to IR for biopsy.  Patient is at a acute care facility where she has seen a nephrologist who is recommending initiating dialysis after the biopsy.  Not complaining of any chest pain shortness of breath calf pain.  Patient is in need of nephrology set up.

## 2024-12-27 NOTE — CONSULT NOTE ADULT - ASSESSMENT
70 y/o female with PMHx of hypertension hyperlipidemia diabetes CKD stage V referred from interventional radiology for abnormal labs     Impressions:  # CKD Stage V  - complicated by uremia (metallic taste, loss of appetite - but no itching), hyperkalemia (>5) and metabolic acidosis combined AG and non-AG (AG 18 and Bicarb 10, pH 7.16 on vbg)  - does not appear overtly overloaded clinically  - still making urine, on Bumex 2 mg daily  # HTN  - BP slightly elevated on admission  # DM   - last A1c 5.5 on 12/20/24  - on long and short acting insulin     Recommendations:  - check phosphorus and iron stores  - obtain kidney bladder US and chest xray  - strict I/Os   - renal diet   - avoid nephrotoxic meds     Recommendations are not final until attending attestations    Thank you for the opportunity to participate in the care of your patient.   The nephrology service remains available to assist with any questions or concerns.   Please feel free to reach us by paging the on-call nephrology fellow for urgent issues or as below.          70 y/o female with PMHx of hypertension hyperlipidemia diabetes CKD stage V referred from interventional radiology for abnormal labs     Impressions:  # CKD Stage V  - complicated by uremia (metallic taste, loss of appetite - but no itching), hyperkalemia (>5) and metabolic acidosis combined AG and non-AG (AG 18 and Bicarb 10, pH 7.16 on vbg)  - does not appear overtly overloaded clinically  - still making urine, on Bumex 2 mg daily  - last saw a nephrologist Dr. Padilla before Covid  # HTN  - BP slightly elevated on admission  - c/w Nifedipine and Carvedilol for now  # DM   - last A1c 5.5 on 12/20/24  - on long and short acting insulin     Recommendations:  - check phosphorus, PTH and iron stores  - obtain kidney bladder US and chest xray  - obtain hepatitis panel  - strict I/Os   - renal diet   - avoid nephrotoxic meds   - start on Bicarb drip, 100 cc/hr --> will reassess in AM --> plan to also give PO bicarb  - start on Lokelma 5 mg  - Hold Bumex for now --> will reassess volume status tmrw  - repeat CMP in AM  - will require dialysis soon but no indication for emergent hemodialysis today  - will f/u    Thank you for the opportunity to participate in the care of your patient.   The nephrology service remains available to assist with any questions or concerns.   Please feel free to reach us by paging the on-call nephrology fellow for urgent issues or as below.

## 2024-12-27 NOTE — CONSULT NOTE ADULT - SUBJECTIVE AND OBJECTIVE BOX
NEPHROLOGY CONSULTATION NOTE    70 y/o female with PMHx of hypertension hyperlipidemia diabetes CKD for several years presents today for hyperkalemia.    Patient incidentally found a renal mass on CT several weeks ago and was referred to IR for biopsy.  Not complaining of any chest pain shortness of breath calf pain.      PAST MEDICAL & SURGICAL HISTORY:  DM (diabetes mellitus)  HTN (hypertension)  HLD (hyperlipidemia)  Obese  H/O Spinal surgery  H/O heart artery stent  H/O  section    Allergies:  No Known Allergies    Home Medications Reviewed  Hospital Medications:   MEDICATIONS  (STANDING):    SOCIAL HISTORY:  Denies ETOH,Smoking,   FAMILY HISTORY:      REVIEW OF SYSTEMS:  CONSTITUTIONAL: No fevers or chills. Weight loss. Loss of appetite   EYES/ENT: No visual changes;  No vertigo or throat pain. No pain or stiffness  RESPIRATORY: No cough, wheezing, hemoptysis; No shortness of breath  CARDIOVASCULAR: No chest pain or palpitations.  GASTROINTESTINAL: No abdominal or epigastric pain. No nausea, vomiting, or hematemesis; No diarrhea or constipation. No melena or hematochezia.  GENITOURINARY: No dysuria, frequency, foamy urine, urinary urgency, incontinence or hematuria  SKIN: No itching, burning, rashes, or lesions   VASCULAR: No bilateral lower extremity edema.   All other review of systems is negative unless indicated above.    VITALS:  Vital Signs Last 24 Hrs  T(C): 36.4 (27 Dec 2024 09:21), Max: 36.4 (27 Dec 2024 09:21)  T(F): 97.6 (27 Dec 2024 09:21), Max: 97.6 (27 Dec 2024 09:21)  HR: 71 (27 Dec 2024 09:21) (71 - 76)  BP: 145/79 (27 Dec 2024 09:21) (145/79 - 162/70)  RR: 18 (27 Dec 2024 09:21) (18 - 18)  SpO2: 100% (27 Dec 2024 09:21) (100% - 100%)    Parameters below as of 27 Dec 2024 09:21  Patient On (Oxygen Delivery Method): room air    Height (cm): 157.5 ( @ 09:21), 157.5 ( @ 07:46)  Weight (kg): 93 ( @ 09:21), 93 ( @ 07:46)  BMI (kg/m2): 37.5 ( @ 09:21), 37.5 ( @ 07:46)  BSA (m2): 1.93 ( @ 09:21), 1.93 ( @ 07:46)    PHYSICAL EXAM:  Constitutional: NAD  HEENT: anicteric sclera, oropharynx clear, MMM  Neck: No JVD  Respiratory: CTAB, no wheezes, rales or rhonchi  Cardiovascular: S1, S2, RRR  Gastrointestinal: BS+, soft, NT/ND  Extremities: No cyanosis or clubbing. No peripheral edema  Neurological: A/O x 3, no focal deficits  Psychiatric: Normal mood, normal affect  : No CVA tenderness. No Cardenas  Skin: No rashes      LABS:      130[L]  |  102  |  94[HH]  ----------------------------<  92  5.4[H]   |  10[L]  |  6.9[HH]    Ca    8.2[L]      27 Dec 2024 10:30    TPro  7.3  /  Alb  3.9  /  TBili  0.3  /  DBili      /  AST  11  /  ALT  8   /  AlkPhos  91      Creatinine Trend: 6.9 <--, 6.6 <--                        12.2   8.63  )-----------( 236      ( 27 Dec 2024 10:30 )             40.2       Urine Studies:    Urinalysis Basic - ( 27 Dec 2024 10:30 )  Color:  / Appearance:  / SG:  / pH:   Gluc: 92 mg/dL / Ketone:   / Bili:  / Urobili:    Blood:  / Protein:  / Nitrite:    Leuk Esterase:  / RBC:  / WBC    Sq Epi:  / Non Sq Epi:  / Bacteria:       RADIOLOGY & ADDITIONAL STUDIES:  ACC: [id number] EXAM:  PETCT SKUL-THI ONC FDG INIT   ORDERED BY: LIDIA PEOPLES     PROCEDURE DATE:  2024      INTERPRETATION:  FDG PET CT STUDY, INITIAL TREATMENT STRATEGY  REASON: TUMOR IMAGING - PET with concurrently acquired CT for attenuation correction and anatomic localization; skull base to mid - thigh / 15070    HISTORY: Renal mass  Blood glucose pre injection 72 mg/dL  TECHNIQUE: Approximately 45 minutes after the intravenous administration of 10.3  mCi 18-Fluorine FDG, whole body PET images were acquired from base of skull to mid - thigh. In addition, non-contrast, low dose, non - diagnostic CT was acquired for attenuation correction and anatomic correlation purposes only.  These images reveal the left retroperitoneal mass noted on outside CT chest 2024 is no longer identified. There is FDG avid linear density in the left pararenal/retroperitoneal region (SUV 9.7). There is additional nonspecific infiltrative changes/stranding in the medial retroperitoneum/pararenal space with mild FDG uptake (SUV 4.1). This should be correlated with surgical history.    There is small loculated left pleural fluid and adjacent FDG avid likely atelectasis (SUV up to 8.1). Compared to prior CT chest 2024 the left pleural effusion is significantly decreased.    There are no other definite sites of abnormal FDG uptake to suggest biologic tumor activity.    IMPRESSION:  The left retroperitoneal mass noted on outside CT chest 2024 is nolonger identified. There is FDG avid linear density in the left pararenal/retroperitoneal region (SUV 9.7) as well as nonspecific infiltrative changes/stranding in the medial retroperitoneum/pararenal   space with mild FDG uptake (SUV 4.1). This should be correlated with surgical history as this may represent FDG uptake related to postsurgical change; however residual FDG avid disease cannot be excluded.    Small loculated left pleural fluid and adjacent FDG avid likely atelectasis (SUV up to 8.1). Compared to prior CT chest 2024 the left pleural effusion is significantly decreased.    No other definite sites of abnormal FDG uptake to suggest biologic tumor activity.    --- End of Report ---    TON ROOT MD; Attending Radiologist  This document has been electronically signed. Dec 13 2024 12:47AM NEPHROLOGY CONSULTATION NOTE    68 y/o female with PMHx of hypertension hyperlipidemia diabetes CKD for several years presents today for hyperkalemia.    Patient incidentally found a renal mass on CT several weeks ago and was referred to IR for biopsy.  Not complaining of any chest pain shortness of breath calf pain.      PAST MEDICAL & SURGICAL HISTORY:  DM (diabetes mellitus)  HTN (hypertension)  HLD (hyperlipidemia)  Obese  H/O Spinal surgery  H/O heart artery stent  H/O  section    Allergies:  No Known Allergies    Home Medications Reviewed  Hospital Medications:   MEDICATIONS  (STANDING):    SOCIAL HISTORY:  Denies ETOH,Smoking,   FAMILY HISTORY:      REVIEW OF SYSTEMS:  CONSTITUTIONAL: No fevers or chills. Weight loss. Loss of appetite   EYES/ENT: No visual changes;  No vertigo or throat pain. No pain or stiffness  RESPIRATORY: No cough, wheezing, hemoptysis; No shortness of breath  CARDIOVASCULAR: No chest pain or palpitations.  GASTROINTESTINAL: No abdominal or epigastric pain. No nausea, vomiting, or hematemesis; No diarrhea or constipation. No melena or hematochezia.  GENITOURINARY: No dysuria, frequency, foamy urine, urinary urgency, incontinence or hematuria  SKIN: No itching, burning, rashes, or lesions   VASCULAR: No bilateral lower extremity edema.   All other review of systems is negative unless indicated above.    VITALS:  Vital Signs Last 24 Hrs  T(C): 36.4 (27 Dec 2024 09:21), Max: 36.4 (27 Dec 2024 09:21)  T(F): 97.6 (27 Dec 2024 09:21), Max: 97.6 (27 Dec 2024 09:21)  HR: 71 (27 Dec 2024 09:21) (71 - 76)  BP: 145/79 (27 Dec 2024 09:21) (145/79 - 162/70)  RR: 18 (27 Dec 2024 09:21) (18 - 18)  SpO2: 100% (27 Dec 2024 09:21) (100% - 100%)    Parameters below as of 27 Dec 2024 09:21  Patient On (Oxygen Delivery Method): room air    Height (cm): 157.5 ( @ 09:21), 157.5 ( @ 07:46)  Weight (kg): 93 ( @ 09:21), 93 ( @ 07:46)  BMI (kg/m2): 37.5 ( @ 09:21), 37.5 ( @ 07:46)  BSA (m2): 1.93 ( @ 09:21), 1.93 ( @ 07:46)    PHYSICAL EXAM:  Constitutional: NAD  HEENT: anicteric sclera, oropharynx clear, MMM  Neck: No JVD  Respiratory: CTAB, no wheezes, rales or rhonchi  Cardiovascular: S1, S2, RRR  Gastrointestinal: BS+, soft, NT/ND  Extremities: No cyanosis or clubbing. No peripheral edema  Neurological: A/O x 3, no focal deficits  Psychiatric: Normal mood, normal affect  : No CVA tenderness. No Cardenas  Skin: No rashes      LABS:      130[L]  |  102  |  94[HH]  ----------------------------<  92  5.4[H]   |  10[L]  |  6.9[HH]    Ca    8.2[L]      27 Dec 2024 10:30    TPro  7.3  /  Alb  3.9  /  TBili  0.3  /  DBili      /  AST  11  /  ALT  8   /  AlkPhos  91      Creatinine Trend: 6.9 <--, 6.6 <--                        12.2   8.63  )-----------( 236      ( 27 Dec 2024 10:30 )             40.2       Urine Studies:    Urinalysis Basic - ( 27 Dec 2024 10:30 )  Color:  / Appearance:  / SG:  / pH:   Gluc: 92 mg/dL / Ketone:   / Bili:  / Urobili:    Blood:  / Protein:  / Nitrite:    Leuk Esterase:  / RBC:  / WBC    Sq Epi:  / Non Sq Epi:  / Bacteria:       RADIOLOGY & ADDITIONAL STUDIES:  ACC: [id number] EXAM:  PETCT SKUL-THI ONC FDG INIT   ORDERED BY: LIDIA PEOPLES     PROCEDURE DATE:  2024      INTERPRETATION:  FDG PET CT STUDY, INITIAL TREATMENT STRATEGY  REASON: TUMOR IMAGING - PET with concurrently acquired CT for attenuation correction and anatomic localization; skull base to mid - thigh / [id number]    HISTORY: Renal mass  Blood glucose pre injection 72 mg/dL  TECHNIQUE: Approximately 45 minutes after the intravenous administration of 10.3  mCi 18-Fluorine FDG, whole body PET images were acquired from base of skull to mid - thigh. In addition, non-contrast, low dose, non - diagnostic CT was acquired for attenuation correction and anatomic correlation purposes only.  These images reveal the left retroperitoneal mass noted on outside CT chest 2024 is no longer identified. There is FDG avid linear density in the left pararenal/retroperitoneal region (SUV 9.7). There is additional nonspecific infiltrative changes/stranding in the medial retroperitoneum/pararenal space with mild FDG uptake (SUV 4.1). This should be correlated with surgical history.    There is small loculated left pleural fluid and adjacent FDG avid likely atelectasis (SUV up to 8.1). Compared to prior CT chest 2024 the left pleural effusion is significantly decreased.    There are no other definite sites of abnormal FDG uptake to suggest biologic tumor activity.    IMPRESSION:  The left retroperitoneal mass noted on outside CT chest 2024 is nolonger identified. There is FDG avid linear density in the left pararenal/retroperitoneal region (SUV 9.7) as well as nonspecific infiltrative changes/stranding in the medial retroperitoneum/pararenal   space with mild FDG uptake (SUV 4.1). This should be correlated with surgical history as this may represent FDG uptake related to postsurgical change; however residual FDG avid disease cannot be excluded.    Small loculated left pleural fluid and adjacent FDG avid likely atelectasis (SUV up to 8.1). Compared to prior CT chest 2024 the left pleural effusion is significantly decreased.    No other definite sites of abnormal FDG uptake to suggest biologic tumor activity.    --- End of Report ---    TON ROOT MD; Attending Radiologist  This document has been electronically signed. Dec 13 2024 12:47AM

## 2024-12-27 NOTE — ED PROVIDER NOTE - ATTENDING CONTRIBUTION TO CARE
69-year-old female past medical history of obesity, HLD, HTN, DM, CKD  years presenting from  for evaluation of elevated potassium.  Patient was diagnosed with a renal mass, was supposed to undergo biopsy today, however, was found to have potassium of 5.7 this morning.  She denies any complaints.  Patient states that before COVID she was told that her kidneys are not functioning properly and to follow-up with a nephrologist. She has not as of yet.  She lives in [place], developed pneumonia few weeks ago and was sent to [place] to a nursing home for rehab.  While in rehab she saw a nephrologist whose name she does not remember.  Well-appearing well-nourished, NAD, head AT/NC, PERRL, pink conjunctivae,  mmm,  nml work of breathing, lungs CTA b/l, equal air entry, RRR, well-perfused extremities, distal pulses intact, abdomen soft, NT/ND, A&Ox3, no gross neuro deficits, nml mood and affect. Plan: ECG, Labs, reassess.

## 2024-12-27 NOTE — H&P ADULT - ASSESSMENT
Patient is a 61-year-old female past medical history of hypertension hyperlipidemia diabetes CKD for several years presents today for hyperkalemia. Is being admitted for further management.      # JASMINE on CKD 5 vs CKD 5 progression   # HAGMA and NAGMA  # Hyperkalemia, moderate   # abdominal mass needing biopsy  - pt found to be uremic, hyperkalemic, with combined HAGMA and NAGMA  - oliguric, still making urine, not overtly overloaded on exam  - Nephro recs appr:  bicarb gtt @ 75cc/hr  loklema 10g q8 + low K/renal diet  check IP and PTH  no emergent need for dialysis  - will check hepatitis panel, serum uric acid  - f/u CXR, RBUS  - BMP bid for now  - Tele monitoring    #HTN  #HLD    #IDDM      # Misc  - GI ppx:    - DVT ppx: heparin subq  - Diet: renal  - Activity: IAT  - DISPO: Tele    CODE STATUS:      Patient is a 61-year-old female past medical history of hypertension hyperlipidemia diabetes CKD for several years presents today for hyperkalemia. Is being admitted for further management.      # JASMINE on CKD 5 vs CKD 5 progression   # HAGMA and NAGMA  # Hyperkalemia, moderate   # abdominal mass needing biopsy  - pt found to be uremic, hyperkalemic, with combined HAGMA and NAGMA  - oliguric, still making urine, not overtly overloaded on exam  - Nephro recs appr:  bicarb gtt @ 75cc/hr  loklema 10g q8 + low K/renal diet  check IP and PTH  no emergent need for dialysis  - will check hepatitis panel, serum uric acid  - f/u CXR, RBUS  - BMP bid for now, daily phos  - will add on 25-hydroxy as well  - IR f/u: may opt for bx while i/p  - Tele monitoring    #HTN  #HLD  - hold bumex 2mg PO daily for now  - hold aldactone 50mg daily for now  - c/w procardia 30mg qd and coreg 6.25mg bid    #IDDM  - on BB 24 and 8  - will do 12 and 4 while in patient  - monitor FS, keep 140-180, adjust as needed    # Misc  - GI ppx: protonix  - DVT ppx: heparin subq  - Diet: renal  - Activity: IAT  - DISPO: Tele    CODE STATUS: YO

## 2024-12-27 NOTE — ED PROVIDER NOTE - PHYSICAL EXAMINATION
CONSTITUTIONAL: Well-developed; well-nourished;   SKIN: warm, dry  HEAD: Normocephalic; atraumatic.  EYES: PERRL, EOMI, no conjunctival erythema  ENT: No nasal discharge; airway clear.  NECK: Supple; non tender.  CARD Regular rate and rhythm.   RESP: No wheezes, rales or rhonchi.  ABD: soft ntnd  EXT: Normal ROM.  No clubbing, cyanosis or edema.   LYMPH: No acute cervical adenopathy.  NEURO: Alert, oriented, grossly unremarkable  PSYCH: Cooperative, appropriate.

## 2024-12-27 NOTE — H&P ADULT - NSHPPHYSICALEXAM_GEN_ALL_CORE
LOS:     VITALS:   T(C): 36.4 (12-27-24 @ 15:48), Max: 36.4 (12-27-24 @ 09:21)  HR: 75 (12-27-24 @ 15:48) (71 - 76)  BP: 138/61 (12-27-24 @ 15:48) (138/61 - 162/70)  RR: 18 (12-27-24 @ 15:48) (18 - 18)  SpO2: 99% (12-27-24 @ 15:48) (99% - 100%)    GENERAL: NAD, lying in bed comfortably  HEAD:  Atraumatic, Normocephalic  EYES: EOMI, PERRLA, conjunctiva and sclera clear  ENT: Moist mucous membranes  NECK: Supple, No JVD  CHEST/LUNG: Clear to auscultation bilaterally; No rales, rhonchi, wheezing, or rubs. Unlabored respirations  HEART: Regular rate and rhythm; No murmurs, rubs, or gallops  ABDOMEN: BSx4; Soft, nontender, nondistended  EXTREMITIES:  2+ Peripheral Pulses, brisk capillary refill. No clubbing, cyanosis, or edema  NERVOUS SYSTEM:  A&Ox3, no focal deficits   SKIN: No rashes or lesions

## 2024-12-27 NOTE — ASU PATIENT PROFILE, ADULT - FALL HARM RISK - RISK INTERVENTIONS

## 2024-12-27 NOTE — CONSULT NOTE ADULT - ATTENDING COMMENTS
68 y/o female with PMHx of hypertension hyperlipidemia diabetes CKD stage V referred from interventional radiology for abnormal labs   Was supposed to have abdominal mass biopsy    # JASMINE on CKD 5 vs CKD 5 progression   # HAGMA and NAGMA  # Hyperkalemia moderate   # abdominal mass needing biopsy    - suggest hold bumex  - bicarb drip at 75 cc per hour   - lokelma 10 g q8h / low K diet   - check IP and PTH   - follow BMP check hepatitis profile check serum uric acid   - no urgent need for RRT but will follow closely

## 2024-12-27 NOTE — H&P ADULT - HISTORY OF PRESENT ILLNESS
Patient is a 61-year-old female past medical history of hypertension hyperlipidemia diabetes CKD for several years presents today for hyperkalemia. Patient incidentally found a renal mass on CT several weeks ago and was referred to IR for biopsy.  Patient is at a acute care facility where she has not been seen by a Nephrologist since prior to 2021. Had pre-op labs performed at IR facility prior to biopsy which were abnormal, and was brought to hospital for further evaluation.  Not complaining of any chest pain or shortness of breath.    PET/CT 12/12/24:  The left retroperitoneal mass noted on outside CT chest 8/18/2024 is no  longer identified. There is FDG avid linear density in the left   pararenal/retroperitoneal region (SUV 9.7) as well as nonspecific   infiltrative changes/stranding in the medial retroperitoneum/pararenal   space with mild FDG uptake (SUV 4.1).    In ED, vitals were- 145/79, 71, 18, 97.6, 100% on RA  Labs- Na 130, K 5.4, bicarb 10, AG 18, BUN/Cr 94/6.9, Ca 8.2eGFR 6  VBG- ph 7.16, pCO2 32, pO2 30, bicarb 11  EKG- non-ischemic    Patient received 1 liter LR bolus while in ED. She was seen by Nephrology. Is being admitted for further management.  Patient is a 61-year-old female past medical history of hypertension hyperlipidemia diabetes CKD for several years presents today for hyperkalemia. Patient incidentally found a renal mass on CT several weeks ago and was referred to IR for biopsy of left retroperitoneal mass. Patient is at a acute care facility where she has not been seen by a Nephrologist since prior to 2021. Had pre-op labs performed at IR facility prior to biopsy which were abnormal, and was brought to hospital for further evaluation.  Not complaining of any chest pain or shortness of breath.    PET/CT 12/12/24:  The left retroperitoneal mass noted on outside CT chest 8/18/2024 is no  longer identified. There is FDG avid linear density in the left   pararenal/retroperitoneal region (SUV 9.7) as well as nonspecific   infiltrative changes/stranding in the medial retroperitoneum/pararenal   space with mild FDG uptake (SUV 4.1).    In ED, vitals were- 145/79, 71, 18, 97.6, 100% on RA  Labs- Na 130, K 5.4, bicarb 10, AG 18, BUN/Cr 94/6.9, Ca 8.2eGFR 6  VBG- ph 7.16, pCO2 32, pO2 30, bicarb 11  EKG- non-ischemic, RAD, no hyperacute t-waves or NV prolongation present    Patient received 1 liter LR bolus while in ED. She was seen by Nephrology. Is being admitted for further management.

## 2024-12-27 NOTE — ED PROVIDER NOTE - CLINICAL SUMMARY MEDICAL DECISION MAKING FREE TEXT BOX
69-year-old female with CKD stage V with hyperkalemia, no outpatient nephrology follow-up.  Patient is largely asymptomatic, appears very well.  No EKG changes consistent with hyperkalemia.  Potassium in ED is 5.4.  Nephrology was consulted, recommended Lokelma and bicarb drip.  Patient admitted for further management and care.

## 2024-12-28 PROBLEM — E66.9 OBESITY, UNSPECIFIED: Chronic | Status: ACTIVE | Noted: 2024-12-20

## 2024-12-28 LAB
ALBUMIN SERPL ELPH-MCNC: 2.9 G/DL — LOW (ref 3.5–5.2)
ALBUMIN SERPL ELPH-MCNC: 3 G/DL — LOW (ref 3.5–5.2)
ALP SERPL-CCNC: 75 U/L — SIGNIFICANT CHANGE UP (ref 30–115)
ALP SERPL-CCNC: 76 U/L — SIGNIFICANT CHANGE UP (ref 30–115)
ALT FLD-CCNC: 5 U/L — SIGNIFICANT CHANGE UP (ref 0–41)
ALT FLD-CCNC: 6 U/L — SIGNIFICANT CHANGE UP (ref 0–41)
ANION GAP SERPL CALC-SCNC: 17 MMOL/L — HIGH (ref 7–14)
ANION GAP SERPL CALC-SCNC: 18 MMOL/L — HIGH (ref 7–14)
AST SERPL-CCNC: 7 U/L — SIGNIFICANT CHANGE UP (ref 0–41)
AST SERPL-CCNC: 8 U/L — SIGNIFICANT CHANGE UP (ref 0–41)
BASOPHILS # BLD AUTO: 0.04 K/UL — SIGNIFICANT CHANGE UP (ref 0–0.2)
BASOPHILS NFR BLD AUTO: 0.4 % — SIGNIFICANT CHANGE UP (ref 0–1)
BILIRUB SERPL-MCNC: 0.2 MG/DL — SIGNIFICANT CHANGE UP (ref 0.2–1.2)
BILIRUB SERPL-MCNC: 0.4 MG/DL — SIGNIFICANT CHANGE UP (ref 0.2–1.2)
BUN SERPL-MCNC: 83 MG/DL — CRITICAL HIGH (ref 10–20)
BUN SERPL-MCNC: 84 MG/DL — CRITICAL HIGH (ref 10–20)
CALCIUM SERPL-MCNC: 6.9 MG/DL — LOW (ref 8.4–10.4)
CALCIUM SERPL-MCNC: 7.4 MG/DL — LOW (ref 8.4–10.5)
CHLORIDE SERPL-SCNC: 104 MMOL/L — SIGNIFICANT CHANGE UP (ref 98–110)
CHLORIDE SERPL-SCNC: 104 MMOL/L — SIGNIFICANT CHANGE UP (ref 98–110)
CO2 SERPL-SCNC: 11 MMOL/L — LOW (ref 17–32)
CO2 SERPL-SCNC: 12 MMOL/L — LOW (ref 17–32)
CREAT SERPL-MCNC: 6.5 MG/DL — CRITICAL HIGH (ref 0.7–1.5)
CREAT SERPL-MCNC: 6.6 MG/DL — CRITICAL HIGH (ref 0.7–1.5)
EGFR: 6 ML/MIN/1.73M2 — LOW
EGFR: 6 ML/MIN/1.73M2 — LOW
EOSINOPHIL # BLD AUTO: 0.07 K/UL — SIGNIFICANT CHANGE UP (ref 0–0.7)
EOSINOPHIL NFR BLD AUTO: 0.7 % — SIGNIFICANT CHANGE UP (ref 0–8)
GLUCOSE BLDC GLUCOMTR-MCNC: 114 MG/DL — HIGH (ref 70–99)
GLUCOSE BLDC GLUCOMTR-MCNC: 115 MG/DL — HIGH (ref 70–99)
GLUCOSE BLDC GLUCOMTR-MCNC: 134 MG/DL — HIGH (ref 70–99)
GLUCOSE BLDC GLUCOMTR-MCNC: 149 MG/DL — HIGH (ref 70–99)
GLUCOSE SERPL-MCNC: 108 MG/DL — HIGH (ref 70–99)
GLUCOSE SERPL-MCNC: 97 MG/DL — SIGNIFICANT CHANGE UP (ref 70–99)
HAV IGM SER-ACNC: SIGNIFICANT CHANGE UP
HBV CORE IGM SER-ACNC: SIGNIFICANT CHANGE UP
HBV SURFACE AG SER-ACNC: SIGNIFICANT CHANGE UP
HCT VFR BLD CALC: 31.5 % — LOW (ref 37–47)
HCV AB S/CO SERPL IA: 0.1 S/CO — SIGNIFICANT CHANGE UP (ref 0–0.99)
HCV AB SERPL-IMP: SIGNIFICANT CHANGE UP
HGB BLD-MCNC: 9.8 G/DL — LOW (ref 12–16)
IMM GRANULOCYTES NFR BLD AUTO: 0.4 % — HIGH (ref 0.1–0.3)
LYMPHOCYTES # BLD AUTO: 1.26 K/UL — SIGNIFICANT CHANGE UP (ref 1.2–3.4)
LYMPHOCYTES # BLD AUTO: 12.9 % — LOW (ref 20.5–51.1)
MAGNESIUM SERPL-MCNC: 2.3 MG/DL — SIGNIFICANT CHANGE UP (ref 1.8–2.4)
MCHC RBC-ENTMCNC: 24.9 PG — LOW (ref 27–31)
MCHC RBC-ENTMCNC: 31.1 G/DL — LOW (ref 32–37)
MCV RBC AUTO: 79.9 FL — LOW (ref 81–99)
MONOCYTES # BLD AUTO: 1.03 K/UL — HIGH (ref 0.1–0.6)
MONOCYTES NFR BLD AUTO: 10.5 % — HIGH (ref 1.7–9.3)
NEUTROPHILS # BLD AUTO: 7.35 K/UL — HIGH (ref 1.4–6.5)
NEUTROPHILS NFR BLD AUTO: 75.1 % — SIGNIFICANT CHANGE UP (ref 42.2–75.2)
NRBC # BLD: 0 /100 WBCS — SIGNIFICANT CHANGE UP (ref 0–0)
PHOSPHATE SERPL-MCNC: 5.3 MG/DL — HIGH (ref 2.1–4.9)
PLATELET # BLD AUTO: 234 K/UL — SIGNIFICANT CHANGE UP (ref 130–400)
PMV BLD: 11.4 FL — HIGH (ref 7.4–10.4)
POTASSIUM SERPL-MCNC: 4.2 MMOL/L — SIGNIFICANT CHANGE UP (ref 3.5–5)
POTASSIUM SERPL-MCNC: 4.4 MMOL/L — SIGNIFICANT CHANGE UP (ref 3.5–5)
POTASSIUM SERPL-SCNC: 4.2 MMOL/L — SIGNIFICANT CHANGE UP (ref 3.5–5)
POTASSIUM SERPL-SCNC: 4.4 MMOL/L — SIGNIFICANT CHANGE UP (ref 3.5–5)
PROT SERPL-MCNC: 5.8 G/DL — LOW (ref 6–8)
PROT SERPL-MCNC: 6 G/DL — SIGNIFICANT CHANGE UP (ref 6–8)
RBC # BLD: 3.94 M/UL — LOW (ref 4.2–5.4)
RBC # FLD: 18 % — HIGH (ref 11.5–14.5)
SODIUM SERPL-SCNC: 133 MMOL/L — LOW (ref 135–146)
SODIUM SERPL-SCNC: 133 MMOL/L — LOW (ref 135–146)
VIT D25+D1,25 OH+D1,25 PNL SERPL-MCNC: 5.2 PG/ML — LOW (ref 19.9–79.3)
WBC # BLD: 9.79 K/UL — SIGNIFICANT CHANGE UP (ref 4.8–10.8)
WBC # FLD AUTO: 9.79 K/UL — SIGNIFICANT CHANGE UP (ref 4.8–10.8)

## 2024-12-28 PROCEDURE — 99232 SBSQ HOSP IP/OBS MODERATE 35: CPT

## 2024-12-28 PROCEDURE — 71045 X-RAY EXAM CHEST 1 VIEW: CPT | Mod: 26

## 2024-12-28 RX ORDER — SODIUM BICARBONATE 84 MG/ML
1300 INJECTION, SOLUTION INTRAVENOUS THREE TIMES A DAY
Refills: 0 | Status: DISCONTINUED | OUTPATIENT
Start: 2024-12-28 | End: 2025-01-02

## 2024-12-28 RX ADMIN — SODIUM BICARBONATE 75 MEQ/KG/HR: 84 INJECTION, SOLUTION INTRAVENOUS at 05:37

## 2024-12-28 RX ADMIN — CARVEDILOL 6.25 MILLIGRAM(S): 25 TABLET, FILM COATED ORAL at 05:37

## 2024-12-28 RX ADMIN — Medication 4 UNIT(S): at 11:48

## 2024-12-28 RX ADMIN — NIFEDIPINE 30 MILLIGRAM(S): 60 TABLET, EXTENDED RELEASE ORAL at 05:37

## 2024-12-28 RX ADMIN — HEPARIN SODIUM 5000 UNIT(S): 1000 INJECTION, SOLUTION INTRAVENOUS; SUBCUTANEOUS at 17:34

## 2024-12-28 RX ADMIN — INSULIN GLARGINE-YFGN 12 UNIT(S): 100 INJECTION, SOLUTION SUBCUTANEOUS at 21:13

## 2024-12-28 RX ADMIN — SODIUM BICARBONATE 1300 MILLIGRAM(S): 84 INJECTION, SOLUTION INTRAVENOUS at 21:13

## 2024-12-28 RX ADMIN — CARVEDILOL 6.25 MILLIGRAM(S): 25 TABLET, FILM COATED ORAL at 17:35

## 2024-12-28 RX ADMIN — SODIUM ZIRCONIUM CYCLOSILICATE 10 GRAM(S): 10 POWDER, FOR SUSPENSION ORAL at 05:37

## 2024-12-28 RX ADMIN — SODIUM BICARBONATE 100 MEQ/KG/HR: 84 INJECTION, SOLUTION INTRAVENOUS at 17:45

## 2024-12-28 RX ADMIN — SODIUM ZIRCONIUM CYCLOSILICATE 10 GRAM(S): 10 POWDER, FOR SUSPENSION ORAL at 21:12

## 2024-12-28 RX ADMIN — HEPARIN SODIUM 5000 UNIT(S): 1000 INJECTION, SOLUTION INTRAVENOUS; SUBCUTANEOUS at 05:37

## 2024-12-28 RX ADMIN — SODIUM ZIRCONIUM CYCLOSILICATE 10 GRAM(S): 10 POWDER, FOR SUSPENSION ORAL at 13:32

## 2024-12-28 RX ADMIN — PANTOPRAZOLE 40 MILLIGRAM(S): 40 TABLET, DELAYED RELEASE ORAL at 05:37

## 2024-12-28 NOTE — PATIENT PROFILE ADULT - FALL HARM RISK - HARM RISK INTERVENTIONS

## 2024-12-28 NOTE — PATIENT PROFILE ADULT - NSPROGENOTHERPROVIDER_GEN_A_NUR
Informed Caregiver Bessy Ladd of [facility] message below, she voiced understanding nebs as needed for wheezing, SOB, cough, etc, and no need for CXR at this time. none

## 2024-12-28 NOTE — PROGRESS NOTE ADULT - ASSESSMENT
61-year-old female past medical history of hypertension hyperlipidemia diabetes CKD for several years presents today for hyperkalemia. Is being admitted for further management.      # JASMINE on CKD 5 vs CKD 5 progression   # HAGMA and NAGMA  # Hyperkalemia, moderate   # abdominal mass needing biopsy  - pt found to be uremic, hyperkalemic, with combined HAGMA and NAGMA  - oliguric, still making urine, not overtly overloaded on exam  - bicarb gtt @ 100cc/hr, sodium bicarb po 1300mg q8hrs  - continue loklema 10g q8 unless K<4.4  - check IP and PTH  - will check hepatitis panel, serum uric acid  - f/u CXR, RBUS  - BMP bid for now, daily phos  - will add on 25-hydroxy as well  - IR f/u: may opt for bx while i/p-possible Monday   - Tele monitoring    #HTN  #HLD  - hold bumex 2mg PO daily for now  - hold aldactone 50mg daily for now  - c/w procardia 30mg qd and coreg 6.25mg bid    #IDDM  - on BB 24 and 8  - will do 12 and 4 while in patient  - monitor FS, keep 140-180, adjust as needed    # Misc  - GI ppx: protonix  - DVT ppx: heparin subq  - Diet: renal  - Activity: IAT  - DISPO: Tele

## 2024-12-28 NOTE — PROGRESS NOTE ADULT - ASSESSMENT
70 y/o female with PMHx of hypertension hyperlipidemia diabetes CKD stage V referred from interventional radiology for abnormal labs     # JASMINE on CKD 5 vs CKD 5 progression   # HAGMA and NAGMA  # Hyperkalemia moderate   # abdominal mass needing biopsy    - suggest hold bumex  - cont bicarb drip at 100 cc per hour   - add sodium bicarb po 1300 mg q8h   - DC lokelma if K < 4.5   -IP noted no binders   - Uric acid noted due to CKD doubt TLS   - follow BMP check hepatitis profile   - no urgent need for RRT but will follow closely .

## 2024-12-29 LAB
ALBUMIN SERPL ELPH-MCNC: 3 G/DL — LOW (ref 3.5–5.2)
ALP SERPL-CCNC: 70 U/L — SIGNIFICANT CHANGE UP (ref 30–115)
ALT FLD-CCNC: 5 U/L — SIGNIFICANT CHANGE UP (ref 0–41)
ANION GAP SERPL CALC-SCNC: 17 MMOL/L — HIGH (ref 7–14)
APTT BLD: 28.5 SEC — SIGNIFICANT CHANGE UP (ref 27–39.2)
AST SERPL-CCNC: 6 U/L — SIGNIFICANT CHANGE UP (ref 0–41)
BASOPHILS # BLD AUTO: 0.02 K/UL — SIGNIFICANT CHANGE UP (ref 0–0.2)
BASOPHILS NFR BLD AUTO: 0.2 % — SIGNIFICANT CHANGE UP (ref 0–1)
BILIRUB SERPL-MCNC: 0.4 MG/DL — SIGNIFICANT CHANGE UP (ref 0.2–1.2)
BLD GP AB SCN SERPL QL: SIGNIFICANT CHANGE UP
BUN SERPL-MCNC: 78 MG/DL — CRITICAL HIGH (ref 10–20)
CALCIUM SERPL-MCNC: 7 MG/DL — LOW (ref 8.4–10.5)
CHLORIDE SERPL-SCNC: 103 MMOL/L — SIGNIFICANT CHANGE UP (ref 98–110)
CO2 SERPL-SCNC: 15 MMOL/L — LOW (ref 17–32)
CREAT SERPL-MCNC: 6 MG/DL — CRITICAL HIGH (ref 0.7–1.5)
EGFR: 7 ML/MIN/1.73M2 — LOW
EOSINOPHIL # BLD AUTO: 0.12 K/UL — SIGNIFICANT CHANGE UP (ref 0–0.7)
EOSINOPHIL NFR BLD AUTO: 1.2 % — SIGNIFICANT CHANGE UP (ref 0–8)
GLUCOSE BLDC GLUCOMTR-MCNC: 122 MG/DL — HIGH (ref 70–99)
GLUCOSE BLDC GLUCOMTR-MCNC: 139 MG/DL — HIGH (ref 70–99)
GLUCOSE BLDC GLUCOMTR-MCNC: 152 MG/DL — HIGH (ref 70–99)
GLUCOSE BLDC GLUCOMTR-MCNC: 216 MG/DL — HIGH (ref 70–99)
GLUCOSE SERPL-MCNC: 123 MG/DL — HIGH (ref 70–99)
HCT VFR BLD CALC: 30.1 % — LOW (ref 37–47)
HGB BLD-MCNC: 9.5 G/DL — LOW (ref 12–16)
IMM GRANULOCYTES NFR BLD AUTO: 0.7 % — HIGH (ref 0.1–0.3)
INR BLD: 1.14 RATIO — SIGNIFICANT CHANGE UP (ref 0.65–1.3)
LYMPHOCYTES # BLD AUTO: 1.43 K/UL — SIGNIFICANT CHANGE UP (ref 1.2–3.4)
LYMPHOCYTES # BLD AUTO: 14.1 % — LOW (ref 20.5–51.1)
MAGNESIUM SERPL-MCNC: 2 MG/DL — SIGNIFICANT CHANGE UP (ref 1.8–2.4)
MCHC RBC-ENTMCNC: 24.9 PG — LOW (ref 27–31)
MCHC RBC-ENTMCNC: 31.6 G/DL — LOW (ref 32–37)
MCV RBC AUTO: 79 FL — LOW (ref 81–99)
MONOCYTES # BLD AUTO: 1 K/UL — HIGH (ref 0.1–0.6)
MONOCYTES NFR BLD AUTO: 9.9 % — HIGH (ref 1.7–9.3)
NEUTROPHILS # BLD AUTO: 7.47 K/UL — HIGH (ref 1.4–6.5)
NEUTROPHILS NFR BLD AUTO: 73.9 % — SIGNIFICANT CHANGE UP (ref 42.2–75.2)
NRBC # BLD: 0 /100 WBCS — SIGNIFICANT CHANGE UP (ref 0–0)
PHOSPHATE SERPL-MCNC: 5.1 MG/DL — HIGH (ref 2.1–4.9)
PLATELET # BLD AUTO: 244 K/UL — SIGNIFICANT CHANGE UP (ref 130–400)
PMV BLD: 11.4 FL — HIGH (ref 7.4–10.4)
POTASSIUM SERPL-MCNC: 3.6 MMOL/L — SIGNIFICANT CHANGE UP (ref 3.5–5)
POTASSIUM SERPL-SCNC: 3.6 MMOL/L — SIGNIFICANT CHANGE UP (ref 3.5–5)
PROT SERPL-MCNC: 5.8 G/DL — LOW (ref 6–8)
PROTHROM AB SERPL-ACNC: 13.5 SEC — HIGH (ref 9.95–12.87)
RBC # BLD: 3.81 M/UL — LOW (ref 4.2–5.4)
RBC # FLD: 18 % — HIGH (ref 11.5–14.5)
SODIUM SERPL-SCNC: 135 MMOL/L — SIGNIFICANT CHANGE UP (ref 135–146)
WBC # BLD: 10.11 K/UL — SIGNIFICANT CHANGE UP (ref 4.8–10.8)
WBC # FLD AUTO: 10.11 K/UL — SIGNIFICANT CHANGE UP (ref 4.8–10.8)

## 2024-12-29 PROCEDURE — 99232 SBSQ HOSP IP/OBS MODERATE 35: CPT

## 2024-12-29 RX ADMIN — SODIUM BICARBONATE 100 MEQ/KG/HR: 84 INJECTION, SOLUTION INTRAVENOUS at 21:38

## 2024-12-29 RX ADMIN — Medication 4 UNIT(S): at 08:21

## 2024-12-29 RX ADMIN — SODIUM BICARBONATE 1300 MILLIGRAM(S): 84 INJECTION, SOLUTION INTRAVENOUS at 21:20

## 2024-12-29 RX ADMIN — CARVEDILOL 6.25 MILLIGRAM(S): 25 TABLET, FILM COATED ORAL at 05:50

## 2024-12-29 RX ADMIN — PANTOPRAZOLE 40 MILLIGRAM(S): 40 TABLET, DELAYED RELEASE ORAL at 05:50

## 2024-12-29 RX ADMIN — HEPARIN SODIUM 5000 UNIT(S): 1000 INJECTION, SOLUTION INTRAVENOUS; SUBCUTANEOUS at 05:50

## 2024-12-29 RX ADMIN — SODIUM BICARBONATE 100 MEQ/KG/HR: 84 INJECTION, SOLUTION INTRAVENOUS at 12:41

## 2024-12-29 RX ADMIN — SODIUM ZIRCONIUM CYCLOSILICATE 10 GRAM(S): 10 POWDER, FOR SUSPENSION ORAL at 05:50

## 2024-12-29 RX ADMIN — SODIUM BICARBONATE 1300 MILLIGRAM(S): 84 INJECTION, SOLUTION INTRAVENOUS at 05:50

## 2024-12-29 RX ADMIN — CARVEDILOL 6.25 MILLIGRAM(S): 25 TABLET, FILM COATED ORAL at 17:25

## 2024-12-29 RX ADMIN — SODIUM BICARBONATE 100 MEQ/KG/HR: 84 INJECTION, SOLUTION INTRAVENOUS at 03:06

## 2024-12-29 RX ADMIN — INSULIN GLARGINE-YFGN 12 UNIT(S): 100 INJECTION, SOLUTION SUBCUTANEOUS at 21:20

## 2024-12-29 RX ADMIN — Medication 4 UNIT(S): at 17:25

## 2024-12-29 RX ADMIN — SODIUM BICARBONATE 1300 MILLIGRAM(S): 84 INJECTION, SOLUTION INTRAVENOUS at 13:02

## 2024-12-29 RX ADMIN — Medication 2: at 11:55

## 2024-12-29 RX ADMIN — NIFEDIPINE 30 MILLIGRAM(S): 60 TABLET, EXTENDED RELEASE ORAL at 05:50

## 2024-12-29 RX ADMIN — HEPARIN SODIUM 5000 UNIT(S): 1000 INJECTION, SOLUTION INTRAVENOUS; SUBCUTANEOUS at 17:25

## 2024-12-29 RX ADMIN — Medication 4 UNIT(S): at 11:55

## 2024-12-29 NOTE — PROGRESS NOTE ADULT - ASSESSMENT
Patient is a 61-year-old female past medical history of hypertension hyperlipidemia diabetes CKD for several years presents today for hyperkalemia. Is being admitted for further management.      # JASMINE on CKD 5 vs CKD 5 progression   # HAGMA and NAGMA  # Hyperkalemia, moderate   # abdominal mass needing biopsy  - pt found to be uremic, hyperkalemic, with combined HAGMA and NAGMA  - oliguric, still making urine, not overtly overloaded on exam  - Nephro recs appr:  bicarb gtt @ 100cc/hr, sodium bicarb po 1300mg q8hrs  loklema 10g q8 + low K/renal diet  check IP and PTH  no emergent need for dialysis  - will check hepatitis panel, serum uric acid  - f/u CXR, RBUS  - BMP bid for now, daily phos  - will add on 25-hydroxy as well  - IR f/u: may opt for bx while i/p  - Tele monitoring    #HTN  #HLD  - hold bumex 2mg PO daily for now  - hold aldactone 50mg daily for now  - c/w procardia 30mg qd and coreg 6.25mg bid    #IDDM  - on BB 24 and 8  - will do 12 and 4 while in patient  - monitor FS, keep 140-180, adjust as needed    # Misc  - GI ppx: protonix  - DVT ppx: heparin subq  - Diet: renal  - Activity: IAT  - DISPO: Tele

## 2024-12-29 NOTE — PROGRESS NOTE ADULT - ASSESSMENT
68 y/o female with PMHx of hypertension hyperlipidemia diabetes CKD stage V referred from interventional radiology for abnormal labs     # JASMINE on CKD 5 vs CKD 5 progression   # HAGMA and NAGMA  # Hyperkalemia moderate   # abdominal mass needing biopsy      - cont bicarb drip at 100 cc per hour   - cont  sodium bicarb po 1300 mg q8h   - DC lokelma if K < 4.5   -IP noted no binders   - Uric acid noted due to CKD doubt TLS   - follow BMP check hepatitis profile   - no urgent need for RRT but will follow closely discussed with patient possibility of TDC and starting RRT .    will follow

## 2024-12-29 NOTE — PROGRESS NOTE ADULT - ASSESSMENT
61-year-old female past medical history of hypertension hyperlipidemia diabetes CKD for several years presents today for hyperkalemia. Is being admitted for further management.      # JASMINE on CKD 5 vs CKD 5 progression   # HAGMA and NAGMA  # Hyperkalemia, moderate   # abdominal mass needing biopsy  - pt found to be uremic, hyperkalemic, with combined HAGMA and NAGMA  - oliguric, still making urine, not overtly overloaded on exam  - bicarb gtt @ 100cc/hr, sodium bicarb po 1300mg q8hrs  - lokelma stopped   - check IP and PTH  - hepatitis panel-negative, serum uric acid-9.3  - BMP bid for now, daily phos  - On 25-hydroxy as well  - IR f/u: may opt for bx while i/p-possible Monday   - Tele monitoring    #HTN  #HLD  - hold bumex 2mg PO daily for now  - hold aldactone 50mg daily for now  - c/w procardia 30mg qd and coreg 6.25mg bid    #IDDM  - on BB 24 and 8  - will do 12 and 4 while in patient  - monitor FS, keep 140-180, adjust as needed    # Misc  - GI ppx: protonix  - DVT ppx: heparin subq  - Diet: renal  - Activity: IAT  - DISPO: Tele-   Pending-IR biopsy, kidney function

## 2024-12-30 ENCOUNTER — RESULT REVIEW (OUTPATIENT)
Age: 69
End: 2024-12-30

## 2024-12-30 LAB
ANION GAP SERPL CALC-SCNC: 16 MMOL/L — HIGH (ref 7–14)
ANION GAP SERPL CALC-SCNC: 18 MMOL/L — HIGH (ref 7–14)
APTT BLD: 29.2 SEC — SIGNIFICANT CHANGE UP (ref 27–39.2)
BASOPHILS # BLD AUTO: 0.03 K/UL — SIGNIFICANT CHANGE UP (ref 0–0.2)
BASOPHILS NFR BLD AUTO: 0.3 % — SIGNIFICANT CHANGE UP (ref 0–1)
BLD GP AB SCN SERPL QL: SIGNIFICANT CHANGE UP
BUN SERPL-MCNC: 75 MG/DL — CRITICAL HIGH (ref 10–20)
BUN SERPL-MCNC: 79 MG/DL — CRITICAL HIGH (ref 10–20)
CALCIUM SERPL-MCNC: 6.7 MG/DL — LOW (ref 8.4–10.5)
CALCIUM SERPL-MCNC: 6.8 MG/DL — LOW (ref 8.4–10.5)
CHLORIDE SERPL-SCNC: 100 MMOL/L — SIGNIFICANT CHANGE UP (ref 98–110)
CHLORIDE SERPL-SCNC: 95 MMOL/L — LOW (ref 98–110)
CO2 SERPL-SCNC: 19 MMOL/L — SIGNIFICANT CHANGE UP (ref 17–32)
CO2 SERPL-SCNC: 20 MMOL/L — SIGNIFICANT CHANGE UP (ref 17–32)
CREAT SERPL-MCNC: 5.9 MG/DL — CRITICAL HIGH (ref 0.7–1.5)
CREAT SERPL-MCNC: 6.3 MG/DL — CRITICAL HIGH (ref 0.7–1.5)
EGFR: 7 ML/MIN/1.73M2 — LOW
EGFR: 7 ML/MIN/1.73M2 — LOW
EOSINOPHIL # BLD AUTO: 0.12 K/UL — SIGNIFICANT CHANGE UP (ref 0–0.7)
EOSINOPHIL NFR BLD AUTO: 1 % — SIGNIFICANT CHANGE UP (ref 0–8)
GLUCOSE BLDC GLUCOMTR-MCNC: 137 MG/DL — HIGH (ref 70–99)
GLUCOSE BLDC GLUCOMTR-MCNC: 159 MG/DL — HIGH (ref 70–99)
GLUCOSE BLDC GLUCOMTR-MCNC: 211 MG/DL — HIGH (ref 70–99)
GLUCOSE SERPL-MCNC: 152 MG/DL — HIGH (ref 70–99)
GLUCOSE SERPL-MCNC: 194 MG/DL — HIGH (ref 70–99)
HCT VFR BLD CALC: 27.6 % — LOW (ref 37–47)
HCT VFR BLD CALC: 29.2 % — LOW (ref 37–47)
HGB BLD-MCNC: 8.8 G/DL — LOW (ref 12–16)
HGB BLD-MCNC: 9.1 G/DL — LOW (ref 12–16)
IMM GRANULOCYTES NFR BLD AUTO: 0.9 % — HIGH (ref 0.1–0.3)
INR BLD: 1.16 RATIO — SIGNIFICANT CHANGE UP (ref 0.65–1.3)
LYMPHOCYTES # BLD AUTO: 0.87 K/UL — LOW (ref 1.2–3.4)
LYMPHOCYTES # BLD AUTO: 7.5 % — LOW (ref 20.5–51.1)
MAGNESIUM SERPL-MCNC: 1.9 MG/DL — SIGNIFICANT CHANGE UP (ref 1.8–2.4)
MCHC RBC-ENTMCNC: 24.2 PG — LOW (ref 27–31)
MCHC RBC-ENTMCNC: 25.4 PG — LOW (ref 27–31)
MCHC RBC-ENTMCNC: 31.2 G/DL — LOW (ref 32–37)
MCHC RBC-ENTMCNC: 31.9 G/DL — LOW (ref 32–37)
MCV RBC AUTO: 77.7 FL — LOW (ref 81–99)
MCV RBC AUTO: 79.5 FL — LOW (ref 81–99)
MONOCYTES # BLD AUTO: 1.17 K/UL — HIGH (ref 0.1–0.6)
MONOCYTES NFR BLD AUTO: 10.1 % — HIGH (ref 1.7–9.3)
NEUTROPHILS # BLD AUTO: 9.34 K/UL — HIGH (ref 1.4–6.5)
NEUTROPHILS NFR BLD AUTO: 80.2 % — HIGH (ref 42.2–75.2)
NRBC # BLD: 0 /100 WBCS — SIGNIFICANT CHANGE UP (ref 0–0)
NRBC # BLD: 0 /100 WBCS — SIGNIFICANT CHANGE UP (ref 0–0)
PLATELET # BLD AUTO: 304 K/UL — SIGNIFICANT CHANGE UP (ref 130–400)
PLATELET # BLD AUTO: SIGNIFICANT CHANGE UP K/UL (ref 130–400)
PMV BLD: 11.4 FL — HIGH (ref 7.4–10.4)
POTASSIUM SERPL-MCNC: 3.5 MMOL/L — SIGNIFICANT CHANGE UP (ref 3.5–5)
POTASSIUM SERPL-MCNC: 3.8 MMOL/L — SIGNIFICANT CHANGE UP (ref 3.5–5)
POTASSIUM SERPL-SCNC: 3.5 MMOL/L — SIGNIFICANT CHANGE UP (ref 3.5–5)
POTASSIUM SERPL-SCNC: 3.8 MMOL/L — SIGNIFICANT CHANGE UP (ref 3.5–5)
PROTHROM AB SERPL-ACNC: 13.7 SEC — HIGH (ref 9.95–12.87)
RBC # BLD: 3.47 M/UL — LOW (ref 4.2–5.4)
RBC # BLD: 3.76 M/UL — LOW (ref 4.2–5.4)
RBC # FLD: 17.6 % — HIGH (ref 11.5–14.5)
RBC # FLD: 17.8 % — HIGH (ref 11.5–14.5)
SODIUM SERPL-SCNC: 131 MMOL/L — LOW (ref 135–146)
SODIUM SERPL-SCNC: 137 MMOL/L — SIGNIFICANT CHANGE UP (ref 135–146)
WBC # BLD: 11.64 K/UL — HIGH (ref 4.8–10.8)
WBC # BLD: 9.75 K/UL — SIGNIFICANT CHANGE UP (ref 4.8–10.8)
WBC # FLD AUTO: 11.64 K/UL — HIGH (ref 4.8–10.8)
WBC # FLD AUTO: 9.75 K/UL — SIGNIFICANT CHANGE UP (ref 4.8–10.8)

## 2024-12-30 PROCEDURE — 49180 BIOPSY ABDOMINAL MASS: CPT

## 2024-12-30 PROCEDURE — 77012 CT SCAN FOR NEEDLE BIOPSY: CPT | Mod: 26

## 2024-12-30 PROCEDURE — 88305 TISSUE EXAM BY PATHOLOGIST: CPT | Mod: 26

## 2024-12-30 PROCEDURE — 99232 SBSQ HOSP IP/OBS MODERATE 35: CPT

## 2024-12-30 PROCEDURE — 88342 IMHCHEM/IMCYTCHM 1ST ANTB: CPT | Mod: 26

## 2024-12-30 PROCEDURE — 99448 NTRPROF PH1/NTRNET/EHR 21-30: CPT

## 2024-12-30 PROCEDURE — 88341 IMHCHEM/IMCYTCHM EA ADD ANTB: CPT | Mod: 26

## 2024-12-30 PROCEDURE — 88333 PATH CONSLTJ SURG CYTO XM 1: CPT | Mod: 26

## 2024-12-30 RX ORDER — POTASSIUM CHLORIDE 600 MG/1
20 TABLET, FILM COATED, EXTENDED RELEASE ORAL
Refills: 0 | Status: COMPLETED | OUTPATIENT
Start: 2024-12-30 | End: 2024-12-30

## 2024-12-30 RX ADMIN — POTASSIUM CHLORIDE 50 MILLIEQUIVALENT(S): 600 TABLET, FILM COATED, EXTENDED RELEASE ORAL at 20:32

## 2024-12-30 RX ADMIN — SODIUM BICARBONATE 1300 MILLIGRAM(S): 84 INJECTION, SOLUTION INTRAVENOUS at 05:59

## 2024-12-30 RX ADMIN — SODIUM BICARBONATE 100 MEQ/KG/HR: 84 INJECTION, SOLUTION INTRAVENOUS at 20:25

## 2024-12-30 RX ADMIN — PANTOPRAZOLE 40 MILLIGRAM(S): 40 TABLET, DELAYED RELEASE ORAL at 05:59

## 2024-12-30 RX ADMIN — CARVEDILOL 6.25 MILLIGRAM(S): 25 TABLET, FILM COATED ORAL at 17:18

## 2024-12-30 RX ADMIN — Medication 4 UNIT(S): at 17:19

## 2024-12-30 RX ADMIN — POTASSIUM CHLORIDE 50 MILLIEQUIVALENT(S): 600 TABLET, FILM COATED, EXTENDED RELEASE ORAL at 22:31

## 2024-12-30 RX ADMIN — Medication 2: at 17:18

## 2024-12-30 RX ADMIN — NIFEDIPINE 30 MILLIGRAM(S): 60 TABLET, EXTENDED RELEASE ORAL at 05:58

## 2024-12-30 RX ADMIN — INSULIN GLARGINE-YFGN 12 UNIT(S): 100 INJECTION, SOLUTION SUBCUTANEOUS at 21:47

## 2024-12-30 RX ADMIN — HEPARIN SODIUM 5000 UNIT(S): 1000 INJECTION, SOLUTION INTRAVENOUS; SUBCUTANEOUS at 17:18

## 2024-12-30 RX ADMIN — CARVEDILOL 6.25 MILLIGRAM(S): 25 TABLET, FILM COATED ORAL at 05:59

## 2024-12-30 RX ADMIN — SODIUM BICARBONATE 1300 MILLIGRAM(S): 84 INJECTION, SOLUTION INTRAVENOUS at 21:47

## 2024-12-30 RX ADMIN — HEPARIN SODIUM 5000 UNIT(S): 1000 INJECTION, SOLUTION INTRAVENOUS; SUBCUTANEOUS at 05:58

## 2024-12-30 NOTE — CONSULT NOTE ADULT - SUBJECTIVE AND OBJECTIVE BOX
INTERVENTIONAL RADIOLOGY CONSULT:     Procedure Requested: left retroperitoneal biopsy    HPI:  Patient is a 61-year-old female past medical history of hypertension hyperlipidemia diabetes CKD for several years presents today for hyperkalemia. Patient incidentally found a renal mass on CT several weeks ago and was referred to IR for biopsy of left retroperitoneal mass. Patient is at a acute care facility where she has not been seen by a Nephrologist since prior to . Had pre-op labs performed at IR facility prior to biopsy which were abnormal, and was brought to hospital for further evaluation.  Not complaining of any chest pain or shortness of breath.    PET/CT 24:  The left retroperitoneal mass noted on outside CT chest 2024 is no  longer identified. There is FDG avid linear density in the left   pararenal/retroperitoneal region (SUV 9.7) as well as nonspecific   infiltrative changes/stranding in the medial retroperitoneum/pararenal   space with mild FDG uptake (SUV 4.1).    In ED, vitals were- 145/79, 71, 18, 97.6, 100% on RA  Labs- Na 130, K 5.4, bicarb 10, AG 18, BUN/Cr 94/6.9, Ca 8.2eGFR 6  VBG- ph 7.16, pCO2 32, pO2 30, bicarb 11  EKG- non-ischemic, RAD, no hyperacute t-waves or UT prolongation present    Patient received 1 liter LR bolus while in ED. She was seen by Nephrology. Is being admitted for further management.  (27 Dec 2024 15:40)      PAST MEDICAL & SURGICAL HISTORY:  DM (diabetes mellitus)      HTN (hypertension)      HLD (hyperlipidemia)      Obese      H/O Spinal surgery      H/O heart artery stent      H/O  section          MEDICATIONS  (STANDING):  carvedilol 6.25 milliGRAM(s) Oral every 12 hours  dextrose 5%. 1000 milliLiter(s) (50 mL/Hr) IV Continuous <Continuous>  dextrose 5%. 1000 milliLiter(s) (100 mL/Hr) IV Continuous <Continuous>  dextrose 50% Injectable 25 Gram(s) IV Push once  dextrose 50% Injectable 12.5 Gram(s) IV Push once  dextrose 50% Injectable 25 Gram(s) IV Push once  glucagon  Injectable 1 milliGRAM(s) IntraMuscular once  heparin   Injectable 5000 Unit(s) SubCutaneous every 12 hours  insulin glargine Injectable (LANTUS) 12 Unit(s) SubCutaneous at bedtime  insulin lispro (ADMELOG) corrective regimen sliding scale   SubCutaneous three times a day before meals  insulin lispro Injectable (ADMELOG) 4 Unit(s) SubCutaneous three times a day before meals  NIFEdipine XL 30 milliGRAM(s) Oral daily  pantoprazole    Tablet 40 milliGRAM(s) Oral before breakfast  sodium bicarbonate 1300 milliGRAM(s) Oral three times a day  sodium bicarbonate  Infusion 0.054 mEq/kG/Hr (100 mL/Hr) IV Continuous <Continuous>    MEDICATIONS  (PRN):  acetaminophen     Tablet .. 650 milliGRAM(s) Oral every 6 hours PRN Temp greater or equal to 38C (100.4F), Mild Pain (1 - 3)  dextrose Oral Gel 15 Gram(s) Oral once PRN Blood Glucose LESS THAN 70 milliGRAM(s)/deciliter      Allergies    No Known Allergies    Intolerances    Physical Exam:   Vital Signs Last 24 Hrs  T(C): 36.9 (30 Dec 2024 04:34), Max: 37.1 (29 Dec 2024 11:48)  T(F): 98.4 (30 Dec 2024 04:34), Max: 98.7 (29 Dec 2024 11:48)  HR: 85 (30 Dec 2024 04:34) (68 - 85)  BP: 168/67 (30 Dec 2024 04:34) (103/61 - 168/67)  BP(mean): 101 (30 Dec 2024 04:34) (75 - 101)  RR: 18 (30 Dec 2024 04:34) (17 - 18)  SpO2: 95% (30 Dec 2024 04:34) (95% - 95%)    Parameters below as of 30 Dec 2024 04:34  Patient On (Oxygen Delivery Method): room air    Labs:                         9.5    10.11 )-----------( 244      ( 29 Dec 2024 09:14 )             30.1     12-    135  |  103  |  78[HH]  ----------------------------<  123[H]  3.6   |  15[L]  |  6.0[HH]    Ca    7.0[L]      29 Dec 2024 09:14  Phos  5.1       Mg     2.0         TPro  5.8[L]  /  Alb  3.0[L]  /  TBili  0.4  /  DBili  x   /  AST  6   /  ALT  5   /  AlkPhos  70  12    PT/INR - ( 29 Dec 2024 09:14 )   PT: 13.50 sec;   INR: 1.14 ratio         PTT - ( 29 Dec 2024 09:14 )  PTT:28.5 sec    Pertinent labs:                      9.5    10.11 )-----------( 244      ( 29 Dec 2024 09:14 )             30.1           135  |  103  |  78[HH]  ----------------------------<  123[H]  3.6   |  15[L]  |  6.0[HH]    Ca    7.0[L]      29 Dec 2024 09:14  Phos  5.1       Mg     2.0         TPro  5.8[L]  /  Alb  3.0[L]  /  TBili  0.4  /  DBili  x   /  AST  6   /  ALT  5   /  AlkPhos  70  12      PT/INR - ( 29 Dec 2024 09:14 )   PT: 13.50 sec;   INR: 1.14 ratio         PTT - ( 29 Dec 2024 09:14 )  PTT:28.5 sec    Radiology & Additional Studies:     IMPRESSION:  The left retroperitoneal mass noted on outside CT chest 2024 is no  longer identified. There is FDG avid linear density in the left   pararenal/retroperitoneal region (SUV 9.7) as well as nonspecific   infiltrative changes/stranding in the medial retroperitoneum/pararenal   space with mild FDG uptake (SUV 4.1). This should be correlated with   surgical history as this may represent FDG uptake related to postsurgical   change; however residual FDG avid disease cannot be excluded.    Small loculated left pleural fluid and adjacent FDG avid likely   atelectasis (SUV up to 8.1). Compared to prior CT chest 2024 the   left pleural effusion is significantly decreased.    No other definite sites of abnormal FDG uptake to suggest biologic tumor   activity.    --- End of Report ---    Radiology imaging reviewed.       ASSESSMENT/ PLAN:   Patient is a 61-year-old female past medical history of hypertension hyperlipidemia diabetes CKD for several years presents today for hyperkalemia. Patient incidentally found a renal mass on CT several weeks ago and was referred to IR for biopsy of left retroperitoneal mass. Patient is at a acute care facility where she has not been seen by a Nephrologist since prior to . Had pre-op labs performed at IR facility prior to biopsy which were abnormal, and was brought to hospital for further evaluation. IR consulted for L retroperitoneal biopsy.  - plan for left retroperitoneal mass biopsy today  pending the following:  - NPO status  - consent status  - pertinent labs WNL  - heparin subQ , last dose 5am      To contact Interventional Radiology with any questions, concerns or issues please utilize the following:  M-F 7am-5pm: [facility]_ir on teams,  consult spectra: [phone]  After hours/weekends/holidays: [phone]

## 2024-12-30 NOTE — PROGRESS NOTE ADULT - ASSESSMENT
61-year-old female past medical history of hypertension hyperlipidemia diabetes CKD for several years presents today for hyperkalemia. Is being admitted for further management.      # JASMINE on CKD 5 vs CKD 5 progression   # HAGMA and NAGMA  # Hyperkalemia, moderate   # abdominal mass needing biopsy  - pt found to be uremic, hyperkalemic, with combined HAGMA and NAGMA  - oliguric, still making urine, not overtly overloaded on exam  - bicarb gtt @ 100cc/hr, sodium bicarb po 1300mg q8hrs  - lokelma stopped   - check IP and PTH  - hepatitis panel-negative, serum uric acid-9.3  - BMP bid for now, daily phos  - On 25-hydroxy as well  - IR f/u: may opt for bx while i/p-possible Monday   - Tele monitoring discontinued 12/30 as K is stable   - renal mass biopsied by IR 12/30    #HTN  #HLD  - hold bumex 2mg PO daily for now  - hold aldactone 50mg daily for now  - c/w procardia 30mg qd and coreg 6.25mg bid    #IDDM  - on BB 24 and 8  - will do 12 and 4 while in patient  - monitor FS, keep 140-180, adjust as needed    # Misc  - GI ppx: protonix  - DVT ppx: heparin subq  - Diet: renal  - Activity: IAT  - DISPO: med/surg  Pending-IR biopsy, kidney function

## 2024-12-30 NOTE — PROGRESS NOTE ADULT - ASSESSMENT
61-year-old female past medical history of hypertension hyperlipidemia diabetes CKD for several years presents today for hyperkalemia. Is being admitted for further management.    # JASMINE on CKD 5 vs CKD 5 progression   # HAGMA and NAGMA  # Hyperkalemia, moderate   # abdominal mass needing biopsy  - pt found to be uremic, hyperkalemic, with combined HAGMA and NAGMA  - oliguric, still making urine, not overtly overloaded on exam  - bicarb gtt @ 100cc/hr, sodium bicarb po 1300mg q8hrs  - lokelma stopped   - check IP and PTH  - hepatitis panel-negative, serum uric acid-9.3  - BMP bid for now, daily phos  - On 25-hydroxy as well  - Tele monitoring  - IR - bx today    #HTN  #HLD  - hold bumex 2mg PO daily for now  - hold aldactone 50mg daily for now  - c/w procardia 30mg qd and coreg 6.25mg bid    #IDDM  - on BB 24 and 8  - will do 12 and 4 while in patient  - monitor FS, keep 140-180, adjust as needed      #DVT prophylaxis: Heparin  #GI prophylaxis: PPI  #Diet: Renal Restrictions  #Activity: IAT  #Code status: Full Code  #Disposition: TBD    #Pending: IR bx today, monitor kidney fx

## 2024-12-31 LAB
24R-OH-CALCIDIOL SERPL-MCNC: 7 NG/ML — LOW (ref 30–80)
ALBUMIN SERPL ELPH-MCNC: 2.9 G/DL — LOW (ref 3.5–5.2)
ALP SERPL-CCNC: 68 U/L — SIGNIFICANT CHANGE UP (ref 30–115)
ALT FLD-CCNC: 5 U/L — SIGNIFICANT CHANGE UP (ref 0–41)
ANION GAP SERPL CALC-SCNC: 17 MMOL/L — HIGH (ref 7–14)
APTT BLD: 28.8 SEC — SIGNIFICANT CHANGE UP (ref 27–39.2)
AST SERPL-CCNC: 9 U/L — SIGNIFICANT CHANGE UP (ref 0–41)
BASOPHILS # BLD AUTO: 0.04 K/UL — SIGNIFICANT CHANGE UP (ref 0–0.2)
BASOPHILS NFR BLD AUTO: 0.4 % — SIGNIFICANT CHANGE UP (ref 0–1)
BILIRUB SERPL-MCNC: 0.4 MG/DL — SIGNIFICANT CHANGE UP (ref 0.2–1.2)
BUN SERPL-MCNC: 72 MG/DL — CRITICAL HIGH (ref 10–20)
CALCIUM SERPL-MCNC: 6.8 MG/DL — LOW (ref 8.4–10.5)
CHLORIDE SERPL-SCNC: 95 MMOL/L — LOW (ref 98–110)
CO2 SERPL-SCNC: 21 MMOL/L — SIGNIFICANT CHANGE UP (ref 17–32)
CREAT SERPL-MCNC: 5.9 MG/DL — CRITICAL HIGH (ref 0.7–1.5)
EGFR: 7 ML/MIN/1.73M2 — LOW
EOSINOPHIL # BLD AUTO: 0.13 K/UL — SIGNIFICANT CHANGE UP (ref 0–0.7)
EOSINOPHIL NFR BLD AUTO: 1.2 % — SIGNIFICANT CHANGE UP (ref 0–8)
GLUCOSE BLDC GLUCOMTR-MCNC: 104 MG/DL — HIGH (ref 70–99)
GLUCOSE BLDC GLUCOMTR-MCNC: 123 MG/DL — HIGH (ref 70–99)
GLUCOSE BLDC GLUCOMTR-MCNC: 127 MG/DL — HIGH (ref 70–99)
GLUCOSE BLDC GLUCOMTR-MCNC: 224 MG/DL — HIGH (ref 70–99)
GLUCOSE SERPL-MCNC: 111 MG/DL — HIGH (ref 70–99)
HCT VFR BLD CALC: 29.2 % — LOW (ref 37–47)
HGB BLD-MCNC: 9.2 G/DL — LOW (ref 12–16)
IMM GRANULOCYTES NFR BLD AUTO: 0.6 % — HIGH (ref 0.1–0.3)
INR BLD: 1.19 RATIO — SIGNIFICANT CHANGE UP (ref 0.65–1.3)
LYMPHOCYTES # BLD AUTO: 1.47 K/UL — SIGNIFICANT CHANGE UP (ref 1.2–3.4)
LYMPHOCYTES # BLD AUTO: 13.5 % — LOW (ref 20.5–51.1)
MAGNESIUM SERPL-MCNC: 2 MG/DL — SIGNIFICANT CHANGE UP (ref 1.8–2.4)
MCHC RBC-ENTMCNC: 25 PG — LOW (ref 27–31)
MCHC RBC-ENTMCNC: 31.5 G/DL — LOW (ref 32–37)
MCV RBC AUTO: 79.3 FL — LOW (ref 81–99)
MONOCYTES # BLD AUTO: 1.02 K/UL — HIGH (ref 0.1–0.6)
MONOCYTES NFR BLD AUTO: 9.4 % — HIGH (ref 1.7–9.3)
NEUTROPHILS # BLD AUTO: 8.15 K/UL — HIGH (ref 1.4–6.5)
NEUTROPHILS NFR BLD AUTO: 74.9 % — SIGNIFICANT CHANGE UP (ref 42.2–75.2)
NRBC # BLD: 0 /100 WBCS — SIGNIFICANT CHANGE UP (ref 0–0)
PHOSPHATE SERPL-MCNC: 4.5 MG/DL — SIGNIFICANT CHANGE UP (ref 2.1–4.9)
PLATELET # BLD AUTO: 315 K/UL — SIGNIFICANT CHANGE UP (ref 130–400)
PMV BLD: 11.1 FL — HIGH (ref 7.4–10.4)
POTASSIUM SERPL-MCNC: 4.5 MMOL/L — SIGNIFICANT CHANGE UP (ref 3.5–5)
POTASSIUM SERPL-SCNC: 4.5 MMOL/L — SIGNIFICANT CHANGE UP (ref 3.5–5)
PROT SERPL-MCNC: 5.6 G/DL — LOW (ref 6–8)
PROTHROM AB SERPL-ACNC: 14.1 SEC — HIGH (ref 9.95–12.87)
RBC # BLD: 3.68 M/UL — LOW (ref 4.2–5.4)
RBC # FLD: 17.8 % — HIGH (ref 11.5–14.5)
SODIUM SERPL-SCNC: 133 MMOL/L — LOW (ref 135–146)
WBC # BLD: 10.88 K/UL — HIGH (ref 4.8–10.8)
WBC # FLD AUTO: 10.88 K/UL — HIGH (ref 4.8–10.8)

## 2024-12-31 PROCEDURE — 99232 SBSQ HOSP IP/OBS MODERATE 35: CPT

## 2024-12-31 RX ORDER — GABAPENTIN 300 MG/1
100 CAPSULE ORAL DAILY
Refills: 0 | Status: DISCONTINUED | OUTPATIENT
Start: 2024-12-31 | End: 2025-01-02

## 2024-12-31 RX ORDER — CALCITRIOL 0.5 UG/1
0.25 CAPSULE, LIQUID FILLED ORAL DAILY
Refills: 0 | Status: DISCONTINUED | OUTPATIENT
Start: 2024-12-31 | End: 2025-01-02

## 2024-12-31 RX ADMIN — Medication 4 UNIT(S): at 08:02

## 2024-12-31 RX ADMIN — PANTOPRAZOLE 40 MILLIGRAM(S): 40 TABLET, DELAYED RELEASE ORAL at 05:32

## 2024-12-31 RX ADMIN — Medication 4 UNIT(S): at 17:42

## 2024-12-31 RX ADMIN — SODIUM BICARBONATE 1300 MILLIGRAM(S): 84 INJECTION, SOLUTION INTRAVENOUS at 21:49

## 2024-12-31 RX ADMIN — HEPARIN SODIUM 5000 UNIT(S): 1000 INJECTION, SOLUTION INTRAVENOUS; SUBCUTANEOUS at 17:42

## 2024-12-31 RX ADMIN — CARVEDILOL 6.25 MILLIGRAM(S): 25 TABLET, FILM COATED ORAL at 05:33

## 2024-12-31 RX ADMIN — Medication 4 UNIT(S): at 12:16

## 2024-12-31 RX ADMIN — SODIUM BICARBONATE 1300 MILLIGRAM(S): 84 INJECTION, SOLUTION INTRAVENOUS at 05:33

## 2024-12-31 RX ADMIN — Medication 2: at 12:15

## 2024-12-31 RX ADMIN — HEPARIN SODIUM 5000 UNIT(S): 1000 INJECTION, SOLUTION INTRAVENOUS; SUBCUTANEOUS at 05:32

## 2024-12-31 RX ADMIN — NIFEDIPINE 30 MILLIGRAM(S): 60 TABLET, EXTENDED RELEASE ORAL at 05:33

## 2024-12-31 RX ADMIN — CARVEDILOL 6.25 MILLIGRAM(S): 25 TABLET, FILM COATED ORAL at 17:42

## 2024-12-31 RX ADMIN — SODIUM BICARBONATE 1300 MILLIGRAM(S): 84 INJECTION, SOLUTION INTRAVENOUS at 17:42

## 2024-12-31 RX ADMIN — GABAPENTIN 100 MILLIGRAM(S): 300 CAPSULE ORAL at 12:13

## 2024-12-31 RX ADMIN — CALCITRIOL 0.25 MICROGRAM(S): 0.5 CAPSULE, LIQUID FILLED ORAL at 12:13

## 2024-12-31 NOTE — PROGRESS NOTE ADULT - ASSESSMENT
68 y/o female with PMHx of hypertension hyperlipidemia diabetes CKD stage V referred from interventional radiology for abnormal labs   # JASMINE on CKD 5 vs CKD 5 progression   # HAGMA and NAGMA  # Hyperkalemia moderate   # abdominal mass needing biopsy  - check repeat labs today / cr trending down   - non oliguric   -  bp controlled   - d/c bicarbonate drip   - cont  sodium bicarb po 1300 mg q8h   - off lokelma   -IP noted no binders / corrected calcium around 7.8 check vit d/ pth / start calcitriol 0.25 q 24  - followed by IR : Upper portion of fat-containing mass corresponding to area of FDG-avidity was biopsied using a 20G 15 cm coaxial needle system with CT-guidance.  Seven 20G tissue cores obtained and sent for pathology.  - no urgent need for RRT but will follow closely   will follow

## 2024-12-31 NOTE — PROGRESS NOTE ADULT - ASSESSMENT
61-year-old female past medical history of hypertension hyperlipidemia diabetes CKD for several years presents today for hyperkalemia. Is being admitted for further management.      # JASMINE on CKD 5 vs CKD 5 progression   # HAGMA and NAGMA  # Hyperkalemia, moderate   # abdominal mass needing biopsy  - pt found to be uremic, hyperkalemic, with combined HAGMA and NAGMA  - jasmine is improving   - oliguric, still making urine, not overtly overloaded on exam  - bicarb gtt @ 100cc/hr-stopped 12/31, sodium bicarb po 1300mg q8hrs  - lokelma stopped   - check IP and PTH  - hepatitis panel-negative, serum uric acid-9.3  - BMP bid for now, daily phos  - Tele monitoring discontinued 12/30 as K is stable   - renal mass biopsied by IR 12/30    #HTN  #HLD  - hold bumex 2mg PO daily for now  - hold aldactone 50mg daily for now  - c/w procardia 30mg qd and coreg 6.25mg bid    #IDDM  #Neuropathy  - on BB 24 and 8  - will do 12 and 4 while in patient  - monitor FS, keep 140-180, adjust as needed  - 12/31 started gabapentin 100mg qd     # Misc  - GI ppx: protonix  - DVT ppx: heparin subq  - Diet: renal  - Activity: IAT  - DISPO: med/surg  [id number], kidney function , K, BS control, neuropathic pain control

## 2024-12-31 NOTE — PROGRESS NOTE ADULT - ASSESSMENT
61-year-old female past medical history of hypertension hyperlipidemia diabetes CKD for several years presents today for hyperkalemia. Is being admitted for further management.    # JASMINE on CKD 5 vs CKD 5 progression   # HAGMA and NAGMA  # Hyperkalemia, moderate   # abdominal mass needing biopsy  - pt found to be uremic, hyperkalemic, with combined HAGMA and NAGMA  - oliguric, still making urine, not overtly overloaded on exam  - dc bicarb gtt @ 100cc/hr  - cw sodium bicarb po 1300mg q8hrs  - lokelma stopped   - check IP and PTH  - hepatitis panel-negative, serum uric acid-9.3  - BMP bid for now, daily phos  - On 25-hydroxy as well  - Tele monitoring  - IR - bx 12/30/24    #HTN  #HLD  - hold bumex 2mg PO daily for now  - hold aldactone 50mg daily for now  - c/w procardia 30mg qd and coreg 6.25mg bid    #IDDM  #neuropathy  - on BB 24 and 8  - will do 12 and 4 while in patient  - monitor FS, keep 140-180, adjust as needed  - started gabapentin      #DVT prophylaxis: Heparin  #GI prophylaxis: PPI  #Diet: Renal Restrictions  #Activity: IAT  #Code status: Full Code  #Disposition: TBD    #Pending: monitor kidney fx

## 2025-01-01 LAB
ALBUMIN SERPL ELPH-MCNC: 2.9 G/DL — LOW (ref 3.5–5.2)
ALP SERPL-CCNC: 73 U/L — SIGNIFICANT CHANGE UP (ref 30–115)
ALT FLD-CCNC: 7 U/L — SIGNIFICANT CHANGE UP (ref 0–41)
ANION GAP SERPL CALC-SCNC: 17 MMOL/L — HIGH (ref 7–14)
AST SERPL-CCNC: 8 U/L — SIGNIFICANT CHANGE UP (ref 0–41)
BASOPHILS # BLD AUTO: 0.03 K/UL — SIGNIFICANT CHANGE UP (ref 0–0.2)
BASOPHILS NFR BLD AUTO: 0.3 % — SIGNIFICANT CHANGE UP (ref 0–1)
BILIRUB SERPL-MCNC: 0.3 MG/DL — SIGNIFICANT CHANGE UP (ref 0.2–1.2)
BUN SERPL-MCNC: 73 MG/DL — CRITICAL HIGH (ref 10–20)
CALCIUM SERPL-MCNC: 6.5 MG/DL — LOW (ref 8.4–10.5)
CALCIUM SERPL-MCNC: 6.6 MG/DL — LOW (ref 8.4–10.5)
CHLORIDE SERPL-SCNC: 97 MMOL/L — LOW (ref 98–110)
CO2 SERPL-SCNC: 20 MMOL/L — SIGNIFICANT CHANGE UP (ref 17–32)
CREAT SERPL-MCNC: 6.1 MG/DL — CRITICAL HIGH (ref 0.7–1.5)
EGFR: 7 ML/MIN/1.73M2 — LOW
EOSINOPHIL # BLD AUTO: 0.17 K/UL — SIGNIFICANT CHANGE UP (ref 0–0.7)
EOSINOPHIL NFR BLD AUTO: 1.5 % — SIGNIFICANT CHANGE UP (ref 0–8)
GLUCOSE BLDC GLUCOMTR-MCNC: 103 MG/DL — HIGH (ref 70–99)
GLUCOSE BLDC GLUCOMTR-MCNC: 135 MG/DL — HIGH (ref 70–99)
GLUCOSE BLDC GLUCOMTR-MCNC: 142 MG/DL — HIGH (ref 70–99)
GLUCOSE BLDC GLUCOMTR-MCNC: 198 MG/DL — HIGH (ref 70–99)
GLUCOSE SERPL-MCNC: 105 MG/DL — HIGH (ref 70–99)
HCT VFR BLD CALC: 30.2 % — LOW (ref 37–47)
HGB BLD-MCNC: 9.4 G/DL — LOW (ref 12–16)
IMM GRANULOCYTES NFR BLD AUTO: 0.6 % — HIGH (ref 0.1–0.3)
LYMPHOCYTES # BLD AUTO: 1.36 K/UL — SIGNIFICANT CHANGE UP (ref 1.2–3.4)
LYMPHOCYTES # BLD AUTO: 12.2 % — LOW (ref 20.5–51.1)
MAGNESIUM SERPL-MCNC: 2 MG/DL — SIGNIFICANT CHANGE UP (ref 1.8–2.4)
MCHC RBC-ENTMCNC: 24.9 PG — LOW (ref 27–31)
MCHC RBC-ENTMCNC: 31.1 G/DL — LOW (ref 32–37)
MCV RBC AUTO: 80.1 FL — LOW (ref 81–99)
MONOCYTES # BLD AUTO: 0.83 K/UL — HIGH (ref 0.1–0.6)
MONOCYTES NFR BLD AUTO: 7.5 % — SIGNIFICANT CHANGE UP (ref 1.7–9.3)
NEUTROPHILS # BLD AUTO: 8.65 K/UL — HIGH (ref 1.4–6.5)
NEUTROPHILS NFR BLD AUTO: 77.9 % — HIGH (ref 42.2–75.2)
NRBC # BLD: 0 /100 WBCS — SIGNIFICANT CHANGE UP (ref 0–0)
PHOSPHATE SERPL-MCNC: 5 MG/DL — HIGH (ref 2.1–4.9)
PLATELET # BLD AUTO: 335 K/UL — SIGNIFICANT CHANGE UP (ref 130–400)
PMV BLD: 10.7 FL — HIGH (ref 7.4–10.4)
POTASSIUM SERPL-MCNC: 4 MMOL/L — SIGNIFICANT CHANGE UP (ref 3.5–5)
POTASSIUM SERPL-SCNC: 4 MMOL/L — SIGNIFICANT CHANGE UP (ref 3.5–5)
PROT SERPL-MCNC: 5.6 G/DL — LOW (ref 6–8)
PTH-INTACT FLD-MCNC: 256 PG/ML — HIGH (ref 15–65)
RBC # BLD: 3.77 M/UL — LOW (ref 4.2–5.4)
RBC # FLD: 17.8 % — HIGH (ref 11.5–14.5)
SODIUM SERPL-SCNC: 134 MMOL/L — LOW (ref 135–146)
VIT D25+D1,25 OH+D1,25 PNL SERPL-MCNC: 5.7 PG/ML — LOW (ref 19.9–79.3)
WBC # BLD: 11.11 K/UL — HIGH (ref 4.8–10.8)
WBC # FLD AUTO: 11.11 K/UL — HIGH (ref 4.8–10.8)

## 2025-01-01 PROCEDURE — 99232 SBSQ HOSP IP/OBS MODERATE 35: CPT

## 2025-01-01 RX ORDER — GUAIFENESIN 100 MG/5ML
600 SYRUP ORAL ONCE
Refills: 0 | Status: COMPLETED | OUTPATIENT
Start: 2025-01-01 | End: 2025-01-01

## 2025-01-01 RX ADMIN — GABAPENTIN 100 MILLIGRAM(S): 300 CAPSULE ORAL at 11:29

## 2025-01-01 RX ADMIN — CARVEDILOL 6.25 MILLIGRAM(S): 25 TABLET, FILM COATED ORAL at 05:36

## 2025-01-01 RX ADMIN — SODIUM BICARBONATE 1300 MILLIGRAM(S): 84 INJECTION, SOLUTION INTRAVENOUS at 05:36

## 2025-01-01 RX ADMIN — Medication 1: at 11:29

## 2025-01-01 RX ADMIN — HEPARIN SODIUM 5000 UNIT(S): 1000 INJECTION, SOLUTION INTRAVENOUS; SUBCUTANEOUS at 17:55

## 2025-01-01 RX ADMIN — Medication 4 UNIT(S): at 11:29

## 2025-01-01 RX ADMIN — ACETAMINOPHEN 650 MILLIGRAM(S): 80 SOLUTION/ DROPS ORAL at 22:25

## 2025-01-01 RX ADMIN — NIFEDIPINE 30 MILLIGRAM(S): 60 TABLET, EXTENDED RELEASE ORAL at 05:37

## 2025-01-01 RX ADMIN — ACETAMINOPHEN 650 MILLIGRAM(S): 80 SOLUTION/ DROPS ORAL at 22:55

## 2025-01-01 RX ADMIN — Medication 600 MILLIGRAM(S): at 11:47

## 2025-01-01 RX ADMIN — CARVEDILOL 6.25 MILLIGRAM(S): 25 TABLET, FILM COATED ORAL at 17:51

## 2025-01-01 RX ADMIN — CALCITRIOL 0.25 MICROGRAM(S): 0.5 CAPSULE, LIQUID FILLED ORAL at 11:29

## 2025-01-01 RX ADMIN — SODIUM BICARBONATE 1300 MILLIGRAM(S): 84 INJECTION, SOLUTION INTRAVENOUS at 13:06

## 2025-01-01 RX ADMIN — PANTOPRAZOLE 40 MILLIGRAM(S): 40 TABLET, DELAYED RELEASE ORAL at 05:36

## 2025-01-01 RX ADMIN — SODIUM BICARBONATE 1300 MILLIGRAM(S): 84 INJECTION, SOLUTION INTRAVENOUS at 22:22

## 2025-01-01 RX ADMIN — INSULIN GLARGINE-YFGN 12 UNIT(S): 100 INJECTION, SOLUTION SUBCUTANEOUS at 22:21

## 2025-01-01 RX ADMIN — HEPARIN SODIUM 5000 UNIT(S): 1000 INJECTION, SOLUTION INTRAVENOUS; SUBCUTANEOUS at 05:36

## 2025-01-01 NOTE — PROGRESS NOTE ADULT - ASSESSMENT
60 y/o woman with PMH of HTN, hyperlipidemia, DM and CKD 5 for several years presented for hyperkalemia and was admitted for further management.    1. JASMINE on CKD 5 vs CKD 5 progression   HAGMA and NAGMA  Hyperkalemia, moderate and now resolved  abdominal mass   - pt found to be uremic, hyperkalemic, with combined HAGMA and NAGMA  - Cr trended down and is now 6.1  - nonoliguric now  - bicarb gtt @ 100cc/hr-stopped 12/31 and pt is now on sodium bicarb po 1300mg q8hrs  - lokelma stopped   -   - f/u vit D level - pending  - hepatitis panel-negative, serum uric acid-9.3  - daily BMP with phos  - Tele monitoring discontinued 12/30 as K is stable   - renal mass biopsied by IR 12/30 - f/u results  - nephrology f/u appreciated - no urgent need for RRT but will follow closely     2. HTN  on procardia 30mg qd and coreg 6.25mg bid  bumex and aldactone held    3. DM on insulin and with neuropathy  FS acceptable on current insulin regimen  12/31 started gabapentin 100mg qd     4. DVT ppx: heparin SQ    5. Anemia - H/H stable at this time      full code      PROGRESS NOTE HANDOFF    Pending: labs in AM, biopsy results    Family discussion: with pt today    Disposition: home when cleared by nephrology

## 2025-01-01 NOTE — PROGRESS NOTE ADULT - ASSESSMENT
68 y/o female with PMHx of hypertension hyperlipidemia diabetes CKD stage V referred from interventional radiology for abnormal labs.    # JASMINE on CKD 5 vs CKD 5 progression   # HAGMA and NAGMA  # Hyperkalemia moderate   # abdominal mass needing biopsy  creat stable, nonoliguric  - cont  sodium bicarb po 1300 mg q8h   - off lokelma   - IP noted no binders / corrected calcium around 7.8 check vit d/ pth / continue calcitriol 0.25 q 24  - no urgent need for RRT but will follow closely     Nephrology will follow

## 2025-01-02 ENCOUNTER — TRANSCRIPTION ENCOUNTER (OUTPATIENT)
Age: 70
End: 2025-01-02

## 2025-01-02 VITALS
HEART RATE: 79 BPM | SYSTOLIC BLOOD PRESSURE: 118 MMHG | TEMPERATURE: 98 F | OXYGEN SATURATION: 96 % | RESPIRATION RATE: 17 BRPM | DIASTOLIC BLOOD PRESSURE: 66 MMHG

## 2025-01-02 LAB
ALBUMIN SERPL ELPH-MCNC: 2.7 G/DL — LOW (ref 3.5–5.2)
ALP SERPL-CCNC: 66 U/L — SIGNIFICANT CHANGE UP (ref 30–115)
ALT FLD-CCNC: 6 U/L — SIGNIFICANT CHANGE UP (ref 0–41)
ANION GAP SERPL CALC-SCNC: 16 MMOL/L — HIGH (ref 7–14)
AST SERPL-CCNC: 8 U/L — SIGNIFICANT CHANGE UP (ref 0–41)
BASOPHILS # BLD AUTO: 0.03 K/UL — SIGNIFICANT CHANGE UP (ref 0–0.2)
BASOPHILS NFR BLD AUTO: 0.4 % — SIGNIFICANT CHANGE UP (ref 0–1)
BILIRUB SERPL-MCNC: 0.2 MG/DL — SIGNIFICANT CHANGE UP (ref 0.2–1.2)
BUN SERPL-MCNC: 78 MG/DL — CRITICAL HIGH (ref 10–20)
CALCIUM SERPL-MCNC: 6.4 MG/DL — LOW (ref 8.4–10.5)
CHLORIDE SERPL-SCNC: 95 MMOL/L — LOW (ref 98–110)
CO2 SERPL-SCNC: 21 MMOL/L — SIGNIFICANT CHANGE UP (ref 17–32)
CREAT SERPL-MCNC: 5.9 MG/DL — CRITICAL HIGH (ref 0.7–1.5)
EGFR: 7 ML/MIN/1.73M2 — LOW
EOSINOPHIL # BLD AUTO: 0.15 K/UL — SIGNIFICANT CHANGE UP (ref 0–0.7)
EOSINOPHIL NFR BLD AUTO: 2 % — SIGNIFICANT CHANGE UP (ref 0–8)
GLUCOSE BLDC GLUCOMTR-MCNC: 118 MG/DL — HIGH (ref 70–99)
GLUCOSE BLDC GLUCOMTR-MCNC: 183 MG/DL — HIGH (ref 70–99)
GLUCOSE SERPL-MCNC: 110 MG/DL — HIGH (ref 70–99)
HCT VFR BLD CALC: 29.6 % — LOW (ref 37–47)
HGB BLD-MCNC: 9 G/DL — LOW (ref 12–16)
IMM GRANULOCYTES NFR BLD AUTO: 1.1 % — HIGH (ref 0.1–0.3)
LYMPHOCYTES # BLD AUTO: 1.14 K/UL — LOW (ref 1.2–3.4)
LYMPHOCYTES # BLD AUTO: 15 % — LOW (ref 20.5–51.1)
MAGNESIUM SERPL-MCNC: 2 MG/DL — SIGNIFICANT CHANGE UP (ref 1.8–2.4)
MCHC RBC-ENTMCNC: 24.9 PG — LOW (ref 27–31)
MCHC RBC-ENTMCNC: 30.4 G/DL — LOW (ref 32–37)
MCV RBC AUTO: 81.8 FL — SIGNIFICANT CHANGE UP (ref 81–99)
MONOCYTES # BLD AUTO: 0.68 K/UL — HIGH (ref 0.1–0.6)
MONOCYTES NFR BLD AUTO: 8.9 % — SIGNIFICANT CHANGE UP (ref 1.7–9.3)
NEUTROPHILS # BLD AUTO: 5.53 K/UL — SIGNIFICANT CHANGE UP (ref 1.4–6.5)
NEUTROPHILS NFR BLD AUTO: 72.6 % — SIGNIFICANT CHANGE UP (ref 42.2–75.2)
NRBC # BLD: 0 /100 WBCS — SIGNIFICANT CHANGE UP (ref 0–0)
PHOSPHATE SERPL-MCNC: 5.5 MG/DL — HIGH (ref 2.1–4.9)
PLATELET # BLD AUTO: 326 K/UL — SIGNIFICANT CHANGE UP (ref 130–400)
PMV BLD: 11 FL — HIGH (ref 7.4–10.4)
POTASSIUM SERPL-MCNC: 4.3 MMOL/L — SIGNIFICANT CHANGE UP (ref 3.5–5)
POTASSIUM SERPL-SCNC: 4.3 MMOL/L — SIGNIFICANT CHANGE UP (ref 3.5–5)
PROT SERPL-MCNC: 5.5 G/DL — LOW (ref 6–8)
RBC # BLD: 3.62 M/UL — LOW (ref 4.2–5.4)
RBC # FLD: 17.6 % — HIGH (ref 11.5–14.5)
SODIUM SERPL-SCNC: 132 MMOL/L — LOW (ref 135–146)
WBC # BLD: 7.61 K/UL — SIGNIFICANT CHANGE UP (ref 4.8–10.8)
WBC # FLD AUTO: 7.61 K/UL — SIGNIFICANT CHANGE UP (ref 4.8–10.8)

## 2025-01-02 PROCEDURE — 99239 HOSP IP/OBS DSCHRG MGMT >30: CPT

## 2025-01-02 RX ORDER — CALCITRIOL 0.5 UG/1
1 CAPSULE, LIQUID FILLED ORAL
Qty: 0 | Refills: 0 | DISCHARGE
Start: 2025-01-02

## 2025-01-02 RX ORDER — SODIUM BICARBONATE 84 MG/ML
2 INJECTION, SOLUTION INTRAVENOUS
Qty: 180 | Refills: 0
Start: 2025-01-02 | End: 2025-01-31

## 2025-01-02 RX ORDER — SPIRONOLACTONE 25 MG
1 TABLET ORAL
Refills: 0 | DISCHARGE

## 2025-01-02 RX ORDER — CALCITRIOL 0.5 UG/1
1 CAPSULE, LIQUID FILLED ORAL
Qty: 30 | Refills: 0
Start: 2025-01-02 | End: 2025-01-31

## 2025-01-02 RX ORDER — BUMETANIDE 1 MG/1
1 TABLET ORAL
Refills: 0 | DISCHARGE

## 2025-01-02 RX ORDER — SODIUM BICARBONATE 84 MG/ML
2 INJECTION, SOLUTION INTRAVENOUS
Qty: 0 | Refills: 0 | DISCHARGE
Start: 2025-01-02

## 2025-01-02 RX ADMIN — Medication 4 UNIT(S): at 12:03

## 2025-01-02 RX ADMIN — PANTOPRAZOLE 40 MILLIGRAM(S): 40 TABLET, DELAYED RELEASE ORAL at 05:18

## 2025-01-02 RX ADMIN — Medication 1: at 12:03

## 2025-01-02 RX ADMIN — SODIUM BICARBONATE 1300 MILLIGRAM(S): 84 INJECTION, SOLUTION INTRAVENOUS at 05:18

## 2025-01-02 RX ADMIN — CALCITRIOL 0.25 MICROGRAM(S): 0.5 CAPSULE, LIQUID FILLED ORAL at 12:04

## 2025-01-02 RX ADMIN — NIFEDIPINE 30 MILLIGRAM(S): 60 TABLET, EXTENDED RELEASE ORAL at 05:19

## 2025-01-02 RX ADMIN — GABAPENTIN 100 MILLIGRAM(S): 300 CAPSULE ORAL at 12:02

## 2025-01-02 RX ADMIN — SODIUM BICARBONATE 1300 MILLIGRAM(S): 84 INJECTION, SOLUTION INTRAVENOUS at 13:35

## 2025-01-02 RX ADMIN — CARVEDILOL 6.25 MILLIGRAM(S): 25 TABLET, FILM COATED ORAL at 05:18

## 2025-01-02 RX ADMIN — HEPARIN SODIUM 5000 UNIT(S): 1000 INJECTION, SOLUTION INTRAVENOUS; SUBCUTANEOUS at 05:18

## 2025-01-02 RX ADMIN — Medication 4 UNIT(S): at 08:16

## 2025-01-02 NOTE — DISCHARGE NOTE NURSING/CASE MANAGEMENT/SOCIAL WORK - FINANCIAL ASSISTANCE
[facility] provides services at a reduced cost to those who are determined to be eligible through [facility]’s financial assistance program. Information regarding [facility]’s financial assistance program can be found by going to [email or web address] or by calling [phone].

## 2025-01-02 NOTE — DISCHARGE NOTE PROVIDER - CARE PROVIDERS DIRECT ADDRESSES
[email or web address]

## 2025-01-02 NOTE — DISCHARGE NOTE PROVIDER - NSDCCPCAREPLAN_GEN_ALL_CORE_FT
PRINCIPAL DISCHARGE DIAGNOSIS  Diagnosis: Hyperkalemia  Assessment and Plan of Treatment: You came in with what we call acute kidney injury (JASMINE) on top of your existing chronic kidney disease (CKD). This caused a build-up of waste products in your blood (uremia) and also affected your electrolyte balance, specifically your potassium level went up (hyperkalemia) and your blood became too acidic (metabolic acidosis). Your potassium is back to normal, and your kidney function has improved somewhat – your creatinine level, which measures kidney function, has come down, although it's still high.   We found a mass in your abdomen and took a small sample of it with a needle to figure out what it is. We'll talk about those results when they come back.   You have a follow-up appointment with the kidney specialist on Monday, January 6th, to go over everything in more detail, including  a long-term plan for your kidney care.  Please also follow up with your PCP.      SECONDARY DISCHARGE DIAGNOSES  Diagnosis: End-stage renal disease (ESRD)  Assessment and Plan of Treatment:

## 2025-01-02 NOTE — DISCHARGE NOTE NURSING/CASE MANAGEMENT/SOCIAL WORK - NSDCPEFALRISK_GEN_ALL_CORE
For information on Fall & Injury Prevention, visit: [email or web address] OR  [email or web address] OR  https://www.cdc.gov/steadi/patient.html

## 2025-01-02 NOTE — DISCHARGE NOTE PROVIDER - ATTENDING DISCHARGE PHYSICAL EXAMINATION:
Attending attestation  Attending DC note  Pt seen and examined at bedside. No cp or sob> outpt bx follow up   vitals, labs, exam stable  Hospital course as above.  Plan dw pt and agreed to plan  Medically cleared for DC. Med recc completed.  HENRIQUE resident. Spent 32 mins on case

## 2025-01-02 NOTE — DISCHARGE NOTE PROVIDER - PROVIDER TOKENS
PROVIDER:[TOKEN:[[id number]],FOLLOWUP:[2 weeks]],PROVIDER:[TOKEN:[[id number]],FOLLOWUP:[1 week]]

## 2025-01-02 NOTE — PROGRESS NOTE ADULT - SUBJECTIVE AND OBJECTIVE BOX
JORGE GARVIN  69y Female    INTERVAL HPI/OVERNIGHT EVENTS:    no SOB, chest pain, N/V, fever  has a dry cough    T(F): 99.4 (01-01-25 @ 13:12), Max: 99.4 (01-01-25 @ 13:12)  HR: 76 (01-01-25 @ 13:12) (76 - 78)  BP: 116/69 (01-01-25 @ 13:12) (116/69 - 133/69)  RR: 18 (01-01-25 @ 13:12) (17 - 18)  SpO2: 96% (01-01-25 @ 05:13) (96% - 96%) on RA      I&O's Summary    31 Dec 2024 07:01  -  01 Jan 2025 07:00  --------------------------------------------------------  IN: 946 mL / OUT: 1350 mL / NET: -404 mL    01 Jan 2025 07:01  -  01 Jan 2025 15:57  --------------------------------------------------------  IN: 526 mL / OUT: 800 mL / NET: -274 mL      CAPILLARY BLOOD GLUCOSE      POCT Blood Glucose.: 198 mg/dL (01 Jan 2025 11:16)  POCT Blood Glucose.: 103 mg/dL (01 Jan 2025 07:58)  POCT Blood Glucose.: 123 mg/dL (31 Dec 2024 21:30)  POCT Blood Glucose.: 104 mg/dL (31 Dec 2024 16:52)        PHYSICAL EXAM:  GENERAL: NAD  HEAD:  Normocephalic  EYES:  conjunctiva and sclera clear  ENMT: Moist mucous membranes  NERVOUS SYSTEM:  Alert, awake, Good concentration  CHEST/LUNG: CTA b/l  HEART: Regular rate and rhythm  ABDOMEN: Soft, Nontender, Nondistended; Bowel sounds present  EXTREMITIES:   No edema LE  SKIN: warm, dry    Consultant(s) Notes Reviewed:  [x ] YES  [ ] NO  Care Discussed with Consultants/Other Providers [ x] YES  [ ] NO    MEDICATIONS  (STANDING):  calcitriol   Capsule 0.25 MICROGram(s) Oral daily  carvedilol 6.25 milliGRAM(s) Oral every 12 hours  dextrose 5%. 1000 milliLiter(s) (100 mL/Hr) IV Continuous <Continuous>  dextrose 5%. 1000 milliLiter(s) (50 mL/Hr) IV Continuous <Continuous>  dextrose 50% Injectable 25 Gram(s) IV Push once  dextrose 50% Injectable 12.5 Gram(s) IV Push once  dextrose 50% Injectable 25 Gram(s) IV Push once  gabapentin 100 milliGRAM(s) Oral daily  glucagon  Injectable 1 milliGRAM(s) IntraMuscular once  heparin   Injectable 5000 Unit(s) SubCutaneous every 12 hours  insulin glargine Injectable (LANTUS) 12 Unit(s) SubCutaneous at bedtime  insulin lispro (ADMELOG) corrective regimen sliding scale   SubCutaneous three times a day before meals  insulin lispro Injectable (ADMELOG) 4 Unit(s) SubCutaneous three times a day before meals  NIFEdipine XL 30 milliGRAM(s) Oral daily  pantoprazole    Tablet 40 milliGRAM(s) Oral before breakfast  sodium bicarbonate 1300 milliGRAM(s) Oral three times a day    MEDICATIONS  (PRN):  acetaminophen     Tablet .. 650 milliGRAM(s) Oral every 6 hours PRN Temp greater or equal to 38C (100.4F), Mild Pain (1 - 3)  dextrose Oral Gel 15 Gram(s) Oral once PRN Blood Glucose LESS THAN 70 milliGRAM(s)/deciliter      LABS:                        9.4    11.11 )-----------( 335      ( 01 Jan 2025 08:20 )             30.2     01-01    134[L]  |  97[L]  |  73[HH]  ----------------------------<  105[H]  4.0   |  20  |  6.1[HH]    Ca    6.5[L]      01 Jan 2025 08:20  Phos  5.0     01-01  Mg     2.0     01-01    TPro  5.6[L]  /  Alb  2.9[L]  /  TBili  0.3  /  DBili  x   /  AST  8   /  ALT  7   /  AlkPhos  73  01-01    PT/INR - ( 31 Dec 2024 06:18 )   PT: 14.10 sec;   INR: 1.19 ratio         PTT - ( 31 Dec 2024 06:18 )  PTT:28.8 sec            RADIOLOGY & ADDITIONAL TESTS:    Imaging or report Personally Reviewed:  [ x] YES  [ ] NO      < from: Xray Chest 1 View- PORTABLE-Routine (Xray Chest 1 View- PORTABLE-Routine in AM.) (12.28.24 @ 08:47) >  IMPRESSION:    Left basilar opacity.    < end of copied text >        < from: US Kidney and Bladder (12.27.24 @ 16:08) >  IMPRESSION:    Indeterminate hypoechoic lesion anterior to the left kidney measuring 5.5   x 3.4 cm.      < end of copied text >        Case discussed with RN today    Care discussed with pt        
Patient is a 69y old  Female who presents with a chief complaint of Abnormal labs (30 Dec 2024 13:37)      Patient seen and examined at bedside.  Pt denies any chest pain or shortness of breath   ALLERGIES:  No Known Allergies    MEDICATIONS:  acetaminophen     Tablet .. 650 milliGRAM(s) Oral every 6 hours PRN  carvedilol 6.25 milliGRAM(s) Oral every 12 hours  dextrose 5%. 1000 milliLiter(s) IV Continuous <Continuous>  dextrose 5%. 1000 milliLiter(s) IV Continuous <Continuous>  dextrose 50% Injectable 25 Gram(s) IV Push once  dextrose 50% Injectable 12.5 Gram(s) IV Push once  dextrose 50% Injectable 25 Gram(s) IV Push once  dextrose Oral Gel 15 Gram(s) Oral once PRN  glucagon  Injectable 1 milliGRAM(s) IntraMuscular once  heparin   Injectable 5000 Unit(s) SubCutaneous every 12 hours  insulin glargine Injectable (LANTUS) 12 Unit(s) SubCutaneous at bedtime  insulin lispro (ADMELOG) corrective regimen sliding scale   SubCutaneous three times a day before meals  insulin lispro Injectable (ADMELOG) 4 Unit(s) SubCutaneous three times a day before meals  NIFEdipine XL 30 milliGRAM(s) Oral daily  pantoprazole    Tablet 40 milliGRAM(s) Oral before breakfast  sodium bicarbonate 1300 milliGRAM(s) Oral three times a day  sodium bicarbonate  Infusion 0.054 mEq/kG/Hr IV Continuous <Continuous>    Vital Signs Last 24 Hrs  T(F): 98.1 (30 Dec 2024 13:30), Max: 99.6 (30 Dec 2024 10:30)  HR: 83 (30 Dec 2024 14:00) (73 - 85)  BP: 150/68 (30 Dec 2024 14:00) (126/67 - 168/67)  RR: 18 (30 Dec 2024 14:00) (16 - 18)  SpO2: 96% (30 Dec 2024 14:00) (95% - 97%)  I&O's Summary    29 Dec 2024 07:01  -  30 Dec 2024 07:00  --------------------------------------------------------  IN: 2540 mL / OUT: 0 mL / NET: 2540 mL        PHYSICAL EXAM:  General: NAD, A/O x 3  ENT: MMM  Neck: Supple, No JVD  Lungs: diminished breath sounds, no crackles   Cardio: RRR, S1/S2, No murmurs  Abdomen: Soft, Nontender, Nondistended; obese  Extremities: No cyanosis, No edema    LABS:                        QNS    QNS   )-----------( QNS      ( 30 Dec 2024 07:23 )             QNS      12-30    137  |  100  |  79  ----------------------------<  152  3.8   |  19  |  6.3    Ca    6.8      30 Dec 2024 07:23  Phos  5.1     12-29  Mg     1.9     12-30    TPro  5.8  /  Alb  3.0  /  TBili  0.4  /  DBili  x   /  AST  6   /  ALT  5   /  AlkPhos  70  12-29      PT/INR - ( 30 Dec 2024 07:23 )   PT: 13.70 sec;   INR: 1.16 ratio         PTT - ( 30 Dec 2024 07:23 )  PTT:29.2 sec                      POCT Blood Glucose.: 159 mg/dL (30 Dec 2024 07:33)  POCT Blood Glucose.: 152 mg/dL (29 Dec 2024 21:06)  POCT Blood Glucose.: 139 mg/dL (29 Dec 2024 16:52)      Urinalysis Basic - ( 30 Dec 2024 07:23 )    Color: x / Appearance: x / SG: x / pH: x  Gluc: 152 mg/dL / Ketone: x  / Bili: x / Urobili: x   Blood: x / Protein: x / Nitrite: x   Leuk Esterase: x / RBC: x / WBC x   Sq Epi: x / Non Sq Epi: x / Bacteria: x            RADIOLOGY & ADDITIONAL TESTS:    Care Discussed with Consultants/Other Providers: 
INTERVENTIONAL RADIOLOGY BRIEF-OPERATIVE NOTE    Procedure:  Percutaneous, CT-directed core needle biopsy of left para-renal/retroperitoneal mass    Pre-Op Diagnosis:   Left para-renal/retroperitoneal mass    Post-Op Diagnosis:  Same    Attending:  Mikhail  Resident:   None    Anesthesia (type):  [ ] General Anesthesia  [x] Sedation-- Versed, 1 mg and Fentanyl, 25 mcg, iv  [ ] Spinal Anesthesia  [x] Local/Regional, 1% Lidocaine, SQ, 5 cc, left para-spinal region    Total Face-to-Face Sedation Time:  27 minutes    Contrast:  None    Estimated Blood Loss:  3 cc    Condition:   [ ] Critical  [ ] Serious  [ ] Fair   [x] Good    Findings/Follow up Plan of Care:  Upper portion of fat-containing mass corresponding to area of FDG-avidity was biopsied using a 20G 15 cm coaxial needle system with CT-guidance.  Seven 20G tissue cores obtained and sent for pathology.    Specimens Removed:  Seven 20G tissue cores    Implants:  None    Complications:  None immediate    Disposition:  Back to floor;  f/u specimen pathology      Please call Interventional Radiology [phone] with any questions, concerns, or issues.        
Nephrology progress note    Patient is seen and examined, events over the last 24 h noted .  Lying in bed comfortable     Allergies:  No Known Allergies    Hospital Medications:   MEDICATIONS  (STANDING):  carvedilol 6.25 milliGRAM(s) Oral every 12 hours  glucagon  Injectable 1 milliGRAM(s) IntraMuscular once  heparin   Injectable 5000 Unit(s) SubCutaneous every 12 hours  insulin glargine Injectable (LANTUS) 12 Unit(s) SubCutaneous at bedtime  insulin lispro (ADMELOG) corrective regimen sliding scale   SubCutaneous three times a day before meals  insulin lispro Injectable (ADMELOG) 4 Unit(s) SubCutaneous three times a day before meals  NIFEdipine XL 30 milliGRAM(s) Oral daily  pantoprazole    Tablet 40 milliGRAM(s) Oral before breakfast  sodium bicarbonate 1300 milliGRAM(s) Oral three times a day  sodium bicarbonate  Infusion 0.054 mEq/kG/Hr (100 mL/Hr) IV Continuous <Continuous>        VITALS:  T(F): 98.5 (12-29-24 @ 08:56), Max: 99.4 (12-29-24 @ 05:15)  HR: 70 (12-29-24 @ 08:56)  BP: 136/73 (12-29-24 @ 08:56)  RR: 17 (12-29-24 @ 08:56)  SpO2: 95% (12-29-24 @ 08:56)      12-27 @ 07:01  -  12-28 @ 07:00  --------------------------------------------------------  IN: 495 mL / OUT: 0 mL / NET: 495 mL    12-28 @ 07:01  -  12-29 @ 07:00  --------------------------------------------------------  IN: 1531 mL / OUT: 1950 mL / NET: -419 mL          PHYSICAL EXAM:  Constitutional: NAD  Respiratory: CTAB,   Cardiovascular: S1, S2, RRR  Gastrointestinal: BS+, soft, NT/ND  Extremities: No cyanosis or clubbing. No peripheral edema  :  No infante.   Skin: No rashes    LABS:  12-29    135  |  103  |  78[HH]  ----------------------------<  123[H]  3.6   |  15[L]  |  6.0[HH]    Ca    7.0[L]      29 Dec 2024 09:14  Phos  5.1     12-29  Mg     2.0     12-29    TPro  5.8[L]  /  Alb  3.0[L]  /  TBili  0.4  /  DBili      /  AST  6   /  ALT  5   /  AlkPhos  70  12-29                          9.5    10.11 )-----------( 244      ( 29 Dec 2024 09:14 )             30.1       Urine Studies:  Urinalysis Basic - ( 29 Dec 2024 09:14 )    Color:  / Appearance:  / SG:  / pH:   Gluc: 123 mg/dL / Ketone:   / Bili:  / Urobili:    Blood:  / Protein:  / Nitrite:    Leuk Esterase:  / RBC:  / WBC    Sq Epi:  / Non Sq Epi:  / Bacteria:             HBsAg Nonreact      [12-27-24 @ 21:00]  HCV 0.10, Nonreact      [12-27-24 @ 21:00]        RADIOLOGY & ADDITIONAL STUDIES:  
Patient is a 69y old  Female who presents with a chief complaint of abnormal labs (28 Dec 2024 12:29)      Patient seen and examined at bedside.    ALLERGIES:  No Known Allergies    MEDICATIONS:  acetaminophen     Tablet .. 650 milliGRAM(s) Oral every 6 hours PRN  carvedilol 6.25 milliGRAM(s) Oral every 12 hours  dextrose 5%. 1000 milliLiter(s) IV Continuous <Continuous>  dextrose 5%. 1000 milliLiter(s) IV Continuous <Continuous>  dextrose 50% Injectable 25 Gram(s) IV Push once  dextrose 50% Injectable 12.5 Gram(s) IV Push once  dextrose 50% Injectable 25 Gram(s) IV Push once  dextrose Oral Gel 15 Gram(s) Oral once PRN  glucagon  Injectable 1 milliGRAM(s) IntraMuscular once  heparin   Injectable 5000 Unit(s) SubCutaneous every 12 hours  insulin glargine Injectable (LANTUS) 12 Unit(s) SubCutaneous at bedtime  insulin lispro (ADMELOG) corrective regimen sliding scale   SubCutaneous three times a day before meals  insulin lispro Injectable (ADMELOG) 4 Unit(s) SubCutaneous three times a day before meals  NIFEdipine XL 30 milliGRAM(s) Oral daily  pantoprazole    Tablet 40 milliGRAM(s) Oral before breakfast  sodium bicarbonate  Infusion 0.04 mEq/kG/Hr IV Continuous <Continuous>  sodium zirconium cyclosilicate 10 Gram(s) Oral every 8 hours    Vital Signs Last 24 Hrs  T(F): 98.6 (28 Dec 2024 12:42), Max: 99.2 (28 Dec 2024 02:41)  HR: 71 (28 Dec 2024 12:42) (67 - 85)  BP: 116/69 (28 Dec 2024 12:42) (106/75 - 165/87)  RR: 17 (28 Dec 2024 12:42) (16 - 18)  SpO2: 97% (28 Dec 2024 08:04) (97% - 99%)  I&O's Summary    27 Dec 2024 07:01  -  28 Dec 2024 07:00  --------------------------------------------------------  IN: 495 mL / OUT: 0 mL / NET: 495 mL    28 Dec 2024 07:01  -  28 Dec 2024 17:13  --------------------------------------------------------  IN: 450 mL / OUT: 300 mL / NET: 150 mL        PHYSICAL EXAM:  General: NAD, A/O x 3  ENT: MMM  Neck: Supple, No JVD  Lungs: Clear to auscultation bilaterally  Cardio: RRR, S1/S2, No murmurs  Abdomen: Soft, Nontender, Nondistended; Bowel sounds present  Extremities: No cyanosis, No edema    LABS:                        9.8    9.79  )-----------( 234      ( 28 Dec 2024 08:40 )             31.5     12-28    133  |  104  |  84  ----------------------------<  108  4.4   |  11  |  6.6    Ca    7.4      28 Dec 2024 08:40  Phos  5.3     12-28  Mg     2.3     12-28    TPro  6.0  /  Alb  3.0  /  TBili  0.4  /  DBili  x   /  AST  8   /  ALT  6   /  AlkPhos  76  12-28                  08:48 - VBG - pH: 7.16  | pCO2: 32    | pO2: 30    | Lactate: 0.6              POCT Blood Glucose.: 115 mg/dL (28 Dec 2024 16:47)  POCT Blood Glucose.: 149 mg/dL (28 Dec 2024 11:35)  POCT Blood Glucose.: 114 mg/dL (28 Dec 2024 07:46)  POCT Blood Glucose.: 162 mg/dL (27 Dec 2024 21:25)      Urinalysis Basic - ( 28 Dec 2024 08:40 )    Color: x / Appearance: x / SG: x / pH: x  Gluc: 108 mg/dL / Ketone: x  / Bili: x / Urobili: x   Blood: x / Protein: x / Nitrite: x   Leuk Esterase: x / RBC: x / WBC x   Sq Epi: x / Non Sq Epi: x / Bacteria: x            RADIOLOGY & ADDITIONAL TESTS:    Care Discussed with Consultants/Other Providers: 
SUBJECTIVE/OVERNIGHT EVENTS  Today is hospital day 4d. This morning patient was seen and examined at bedside, resting comfortably in bed. No acute or major events overnight. Pt endorses bilateral foot pain. Pt denies any other complaints.    MEDICATIONS  STANDING MEDICATIONS  calcitriol   Capsule 0.25 MICROGram(s) Oral daily  carvedilol 6.25 milliGRAM(s) Oral every 12 hours  dextrose 5%. 1000 milliLiter(s) IV Continuous <Continuous>  dextrose 5%. 1000 milliLiter(s) IV Continuous <Continuous>  dextrose 50% Injectable 25 Gram(s) IV Push once  dextrose 50% Injectable 12.5 Gram(s) IV Push once  dextrose 50% Injectable 25 Gram(s) IV Push once  gabapentin 100 milliGRAM(s) Oral daily  glucagon  Injectable 1 milliGRAM(s) IntraMuscular once  heparin   Injectable 5000 Unit(s) SubCutaneous every 12 hours  insulin glargine Injectable (LANTUS) 12 Unit(s) SubCutaneous at bedtime  insulin lispro (ADMELOG) corrective regimen sliding scale   SubCutaneous three times a day before meals  insulin lispro Injectable (ADMELOG) 4 Unit(s) SubCutaneous three times a day before meals  NIFEdipine XL 30 milliGRAM(s) Oral daily  pantoprazole    Tablet 40 milliGRAM(s) Oral before breakfast  sodium bicarbonate 1300 milliGRAM(s) Oral three times a day    PRN MEDICATIONS  acetaminophen     Tablet .. 650 milliGRAM(s) Oral every 6 hours PRN  dextrose Oral Gel 15 Gram(s) Oral once PRN    VITALS  T(F): 98.5 (12-31-24 @ 13:19), Max: 98.5 (12-31-24 @ 13:19)  HR: 72 (12-31-24 @ 13:19) (71 - 82)  BP: 123/78 (12-31-24 @ 13:19) (94/55 - 137/74)  RR: 19 (12-31-24 @ 13:19) (16 - 19)  SpO2: 95% (12-30-24 @ 16:40) (95% - 95%)  POCT Blood Glucose.: 224 mg/dL (12-31-24 @ 11:22)  POCT Blood Glucose.: 127 mg/dL (12-31-24 @ 07:42)  POCT Blood Glucose.: 137 mg/dL (12-30-24 @ 21:10)  POCT Blood Glucose.: 211 mg/dL (12-30-24 @ 16:39)    PHYSICAL EXAM  GENERAL: NAD, lying in bed comfortably  HEAD:  Atraumatic, normocephalic  HEART: Regular rate and rhythm, no murmurs, rubs, or gallops  LUNGS: Unlabored respirations.  Clear to auscultation bilaterally, no crackles, wheezing, or rhonchi  ABDOMEN: Soft, nontender, nondistended, +BS  EXTREMITIES: No LE edema    LABS             9.2    10.88 )-----------( 315      ( 12-31-24 @ 06:18 )             29.2     133  |  95  |  72  -------------------------<  111   12-31-24 @ 06:18  4.5  |  21  |  5.9    Ca      6.8     12-31-24 @ 06:18  Phos   4.5     12-31-24 @ 06:18  Mg     2.0     12-31-24 @ 06:18    TPro  5.6  /  Alb  2.9  /  TBili  0.4  /  DBili  x   /  AST  9   /  ALT  5   /  AlkPhos  68  /  GGT  x     12-31-24 @ 06:18    PT/INR - ( 12-31-24 @ 06:18 )   PT: 14.10 sec[H];   INR: 1.19 ratio  PTT - ( 12-31-24 @ 06:18 )  PTT:28.8 sec      Urinalysis Basic - ( 31 Dec 2024 06:18 )    Color: x / Appearance: x / SG: x / pH: x  Gluc: 111 mg/dL / Ketone: x  / Bili: x / Urobili: x   Blood: x / Protein: x / Nitrite: x   Leuk Esterase: x / RBC: x / WBC x   Sq Epi: x / Non Sq Epi: x / Bacteria: x    
seen and examined  24 h events noted   no distress       PAST HISTORY  --------------------------------------------------------------------------------  No significant changes to PMH, PSH, FHx, SHx, unless otherwise noted    ALLERGIES & MEDICATIONS  --------------------------------------------------------------------------------  Allergies    No Known Allergies    Intolerances      Standing Inpatient Medications  carvedilol 6.25 milliGRAM(s) Oral every 12 hours  dextrose 5%. 1000 milliLiter(s) IV Continuous <Continuous>  dextrose 5%. 1000 milliLiter(s) IV Continuous <Continuous>  dextrose 50% Injectable 25 Gram(s) IV Push once  dextrose 50% Injectable 12.5 Gram(s) IV Push once  dextrose 50% Injectable 25 Gram(s) IV Push once  glucagon  Injectable 1 milliGRAM(s) IntraMuscular once  heparin   Injectable 5000 Unit(s) SubCutaneous every 12 hours  insulin glargine Injectable (LANTUS) 12 Unit(s) SubCutaneous at bedtime  insulin lispro (ADMELOG) corrective regimen sliding scale   SubCutaneous three times a day before meals  insulin lispro Injectable (ADMELOG) 4 Unit(s) SubCutaneous three times a day before meals  NIFEdipine XL 30 milliGRAM(s) Oral daily  pantoprazole    Tablet 40 milliGRAM(s) Oral before breakfast  sodium bicarbonate 1300 milliGRAM(s) Oral three times a day  sodium bicarbonate  Infusion 0.054 mEq/kG/Hr IV Continuous <Continuous>    PRN Inpatient Medications  acetaminophen     Tablet .. 650 milliGRAM(s) Oral every 6 hours PRN  dextrose Oral Gel 15 Gram(s) Oral once PRN          VITALS/PHYSICAL EXAM  --------------------------------------------------------------------------------  T(C): 36.9 (12-31-24 @ 05:18), Max: 37.6 (12-30-24 @ 10:30)  HR: 71 (12-31-24 @ 05:18) (71 - 83)  BP: 132/71 (12-31-24 @ 05:18) (94/55 - 159/67)  RR: 18 (12-31-24 @ 05:18) (16 - 18)  SpO2: 95% (12-30-24 @ 16:40) (95% - 97%)  Wt(kg): --        12-30-24 @ 07:01  -  12-31-24 @ 07:00  --------------------------------------------------------  IN: 1380 mL / OUT: 1350 mL / NET: 30 mL      Physical Exam:  	Gen: NAD  	Pulm: CTA B/L  	CV:  S1S2; no rub  	Abd: +distended  	LE:  no edema  	    LABS/STUDIES  --------------------------------------------------------------------------------              9.1    11.64 >-----------<  304      [12-30-24 @ 18:39]              29.2     131  |  95  |  75  ----------------------------<  194      [12-30-24 @ 18:39]  3.5   |  20  |  5.9        Ca     6.7     [12-30-24 @ 18:39]      Mg     1.9     [12-30-24 @ 07:23]      Phos  5.1     [12-29-24 @ 09:14]    TPro  5.8  /  Alb  3.0  /  TBili  0.4  /  DBili  x   /  AST  6   /  ALT  5   /  AlkPhos  70  [12-29-24 @ 09:14]    PT/INR: PT 13.70, INR 1.16       [12-30-24 @ 07:23]  PTT: 29.2       [12-30-24 @ 07:23]      Creatinine Trend:  SCr 5.9 [12-30 @ 18:39]  SCr 6.3 [12-30 @ 07:23]  SCr 6.0 [12-29 @ 09:14]  SCr 6.5 [12-28 @ 17:07]  SCr 6.6 [12-28 @ 08:40]    Urinalysis - [12-30-24 @ 18:39]      Color  / Appearance  / SG  / pH       Gluc 194 / Ketone   / Bili  / Urobili        Blood  / Protein  / Leuk Est  / Nitrite       RBC  / WBC  / Hyaline  / Gran  / Sq Epi  / Non Sq Epi  / Bacteria         HBsAg Nonreact      [12-27-24 @ 21:00]  HCV 0.10, Nonreact      [12-27-24 @ 21:00]    
seen and examined  24 h events noted   no distress   lying comfortable         PAST HISTORY  --------------------------------------------------------------------------------  No significant changes to PMH, PSH, FHx, SHx, unless otherwise noted    ALLERGIES & MEDICATIONS  --------------------------------------------------------------------------------  Allergies    No Known Allergies    Intolerances      Standing Inpatient Medications  calcitriol   Capsule 0.25 MICROGram(s) Oral daily  carvedilol 6.25 milliGRAM(s) Oral every 12 hours  dextrose 5%. 1000 milliLiter(s) IV Continuous <Continuous>  dextrose 5%. 1000 milliLiter(s) IV Continuous <Continuous>  dextrose 50% Injectable 25 Gram(s) IV Push once  dextrose 50% Injectable 12.5 Gram(s) IV Push once  dextrose 50% Injectable 25 Gram(s) IV Push once  gabapentin 100 milliGRAM(s) Oral daily  glucagon  Injectable 1 milliGRAM(s) IntraMuscular once  heparin   Injectable 5000 Unit(s) SubCutaneous every 12 hours  insulin glargine Injectable (LANTUS) 12 Unit(s) SubCutaneous at bedtime  insulin lispro (ADMELOG) corrective regimen sliding scale   SubCutaneous three times a day before meals  insulin lispro Injectable (ADMELOG) 4 Unit(s) SubCutaneous three times a day before meals  NIFEdipine XL 30 milliGRAM(s) Oral daily  pantoprazole    Tablet 40 milliGRAM(s) Oral before breakfast  sodium bicarbonate 1300 milliGRAM(s) Oral three times a day    PRN Inpatient Medications  acetaminophen     Tablet .. 650 milliGRAM(s) Oral every 6 hours PRN  dextrose Oral Gel 15 Gram(s) Oral once PRN      VITALS/PHYSICAL EXAM  --------------------------------------------------------------------------------  T(C): 36.3 (01-02-25 @ 04:11), Max: 37.6 (01-01-25 @ 19:05)  HR: 72 (01-02-25 @ 04:11) (72 - 90)  BP: 121/64 (01-02-25 @ 04:11) (116/69 - 148/69)  RR: 18 (01-02-25 @ 04:11) (18 - 18)  SpO2: 94% (01-02-25 @ 04:11) (93% - 94%)  Wt(kg): --        01-01-25 @ 07:01  -  01-02-25 @ 07:00  --------------------------------------------------------  IN: 526 mL / OUT: 800 mL / NET: -274 mL      Physical Exam:  	Gen: NAD  	Pulm: CTA B/L  	CV:  S1S2; no rub  	Abd:  soft, distended  	    LABS/STUDIES  --------------------------------------------------------------------------------              9.4    11.11 >-----------<  335      [01-01-25 @ 08:20]              30.2     134  |  97  |  73  ----------------------------<  105      [01-01-25 @ 08:20]  4.0   |  20  |  6.1        Ca     6.5     [01-01-25 @ 08:20]      Mg     2.0     [01-01-25 @ 08:20]      Phos  5.0     [01-01-25 @ 08:20]    TPro  5.6  /  Alb  2.9  /  TBili  0.3  /  DBili  x   /  AST  8   /  ALT  7   /  AlkPhos  73  [01-01-25 @ 08:20]      Creatinine Trend:  SCr 6.1 [01-01 @ 08:20]  SCr 5.9 [12-31 @ 06:18]  SCr 5.9 [12-30 @ 18:39]  SCr 6.3 [12-30 @ 07:23]  SCr 6.0 [12-29 @ 09:14]    Urinalysis - [01-01-25 @ 08:20]      Color  / Appearance  / SG  / pH       Gluc 105 / Ketone   / Bili  / Urobili        Blood  / Protein  / Leuk Est  / Nitrite       RBC  / WBC  / Hyaline  / Gran  / Sq Epi  / Non Sq Epi  / Bacteria       PTH -- (Ca 6.6)      [12-31-24 @ 10:40]   256  Vitamin D (25OH) 7      [12-31-24 @ 10:40]    HBsAg Nonreact      [12-27-24 @ 21:00]  HCV 0.10, Nonreact      [12-27-24 @ 21:00]    
Nephrology progress note    Patient is seen and examined, events over the last 24 h noted.  Renal function stable, nonoliguric.    Allergies:  No Known Allergies    Hospital Medications:   MEDICATIONS  (STANDING):  calcitriol   Capsule 0.25 MICROGram(s) Oral daily  carvedilol 6.25 milliGRAM(s) Oral every 12 hours  dextrose 5%. 1000 milliLiter(s) (100 mL/Hr) IV Continuous <Continuous>  dextrose 5%. 1000 milliLiter(s) (50 mL/Hr) IV Continuous <Continuous>  dextrose 50% Injectable 25 Gram(s) IV Push once  dextrose 50% Injectable 12.5 Gram(s) IV Push once  dextrose 50% Injectable 25 Gram(s) IV Push once  gabapentin 100 milliGRAM(s) Oral daily  glucagon  Injectable 1 milliGRAM(s) IntraMuscular once  heparin   Injectable 5000 Unit(s) SubCutaneous every 12 hours  insulin glargine Injectable (LANTUS) 12 Unit(s) SubCutaneous at bedtime  insulin lispro (ADMELOG) corrective regimen sliding scale   SubCutaneous three times a day before meals  insulin lispro Injectable (ADMELOG) 4 Unit(s) SubCutaneous three times a day before meals  NIFEdipine XL 30 milliGRAM(s) Oral daily  pantoprazole    Tablet 40 milliGRAM(s) Oral before breakfast  sodium bicarbonate 1300 milliGRAM(s) Oral three times a day        VITALS:  T(F): 99.4 (01-01-25 @ 13:12), Max: 99.4 (01-01-25 @ 13:12)  HR: 76 (01-01-25 @ 13:12)  BP: 116/69 (01-01-25 @ 13:12)  RR: 18 (01-01-25 @ 13:12)  SpO2: 96% (01-01-25 @ 05:13)  Wt(kg): --    12-30 @ 07:01  -  12-31 @ 07:00  --------------------------------------------------------  IN: 1380 mL / OUT: 1350 mL / NET: 30 mL    12-31 @ 07:01  -  01-01 @ 07:00  --------------------------------------------------------  IN: 946 mL / OUT: 1350 mL / NET: -404 mL    01-01 @ 07:01  -  01-01 @ 13:18  --------------------------------------------------------  IN: 526 mL / OUT: 800 mL / NET: -274 mL          PHYSICAL EXAM:  	Gen: NAD  	Pulm: CTA B/L  	CV:  S1S2; no rub  	Abd: +distended  	LE:  no edema      LABS:  01-01    134[L]  |  97[L]  |  73[HH]  ----------------------------<  105[H]  4.0   |  20  |  6.1[HH]    Ca    6.5[L]      01 Jan 2025 08:20  Phos  5.0     01-01  Mg     2.0     01-01    TPro  5.6[L]  /  Alb  2.9[L]  /  TBili  0.3  /  DBili      /  AST  8   /  ALT  7   /  AlkPhos  73  01-01                          9.4    11.11 )-----------( 335      ( 01 Jan 2025 08:20 )             30.2       Urine Studies:  Urinalysis Basic - ( 01 Jan 2025 08:20 )    Color:  / Appearance:  / SG:  / pH:   Gluc: 105 mg/dL / Ketone:   / Bili:  / Urobili:    Blood:  / Protein:  / Nitrite:    Leuk Esterase:  / RBC:  / WBC    Sq Epi:  / Non Sq Epi:  / Bacteria:         RADIOLOGY & ADDITIONAL STUDIES:  
Patient is a 69y old  Female who presents with a chief complaint of abnormal labs (31 Dec 2024 14:07)      Patient seen and examined at bedside.    ALLERGIES:  No Known Allergies    MEDICATIONS:  acetaminophen     Tablet .. 650 milliGRAM(s) Oral every 6 hours PRN  calcitriol   Capsule 0.25 MICROGram(s) Oral daily  carvedilol 6.25 milliGRAM(s) Oral every 12 hours  dextrose 5%. 1000 milliLiter(s) IV Continuous <Continuous>  dextrose 5%. 1000 milliLiter(s) IV Continuous <Continuous>  dextrose 50% Injectable 25 Gram(s) IV Push once  dextrose 50% Injectable 12.5 Gram(s) IV Push once  dextrose 50% Injectable 25 Gram(s) IV Push once  dextrose Oral Gel 15 Gram(s) Oral once PRN  gabapentin 100 milliGRAM(s) Oral daily  glucagon  Injectable 1 milliGRAM(s) IntraMuscular once  heparin   Injectable 5000 Unit(s) SubCutaneous every 12 hours  insulin glargine Injectable (LANTUS) 12 Unit(s) SubCutaneous at bedtime  insulin lispro (ADMELOG) corrective regimen sliding scale   SubCutaneous three times a day before meals  insulin lispro Injectable (ADMELOG) 4 Unit(s) SubCutaneous three times a day before meals  NIFEdipine XL 30 milliGRAM(s) Oral daily  pantoprazole    Tablet 40 milliGRAM(s) Oral before breakfast  sodium bicarbonate 1300 milliGRAM(s) Oral three times a day    Vital Signs Last 24 Hrs  T(F): 98.5 (31 Dec 2024 13:19), Max: 98.5 (31 Dec 2024 13:19)  HR: 72 (31 Dec 2024 13:19) (71 - 75)  BP: 123/78 (31 Dec 2024 13:19) (123/78 - 137/74)  RR: 19 (31 Dec 2024 13:19) (18 - 19)  SpO2: --  I&O's Summary    30 Dec 2024 07:01  -  31 Dec 2024 07:00  --------------------------------------------------------  IN: 1380 mL / OUT: 1350 mL / NET: 30 mL    31 Dec 2024 07:01  -  31 Dec 2024 18:12  --------------------------------------------------------  IN: 486 mL / OUT: 650 mL / NET: -164 mL        PHYSICAL EXAM:  General: NAD, A/O x 3  ENT: MMM  Neck: Supple, No JVD  Lungs: Clear to auscultation bilaterally, diminished   Cardio: RRR, S1/S2, No murmurs  Abdomen: Soft, Nontender, Nondistended; obese   Extremities: No cyanosis, No edema    LABS:                        9.2    10.88 )-----------( 315      ( 31 Dec 2024 06:18 )             29.2     12-31    133  |  95  |  72  ----------------------------<  111  4.5   |  21  |  5.9    Ca    6.8      31 Dec 2024 06:18  Phos  4.5     12-31  Mg     2.0     12-31    TPro  5.6  /  Alb  2.9  /  TBili  0.4  /  DBili  x   /  AST  9   /  ALT  5   /  AlkPhos  68  12-31      PT/INR - ( 31 Dec 2024 06:18 )   PT: 14.10 sec;   INR: 1.19 ratio         PTT - ( 31 Dec 2024 06:18 )  PTT:28.8 sec                      POCT Blood Glucose.: 104 mg/dL (31 Dec 2024 16:52)  POCT Blood Glucose.: 224 mg/dL (31 Dec 2024 11:22)  POCT Blood Glucose.: 127 mg/dL (31 Dec 2024 07:42)  POCT Blood Glucose.: 137 mg/dL (30 Dec 2024 21:10)      Urinalysis Basic - ( 31 Dec 2024 06:18 )    Color: x / Appearance: x / SG: x / pH: x  Gluc: 111 mg/dL / Ketone: x  / Bili: x / Urobili: x   Blood: x / Protein: x / Nitrite: x   Leuk Esterase: x / RBC: x / WBC x   Sq Epi: x / Non Sq Epi: x / Bacteria: x            RADIOLOGY & ADDITIONAL TESTS:    Care Discussed with Consultants/Other Providers: 
seen and examined  24 h events noted   no distress       PAST HISTORY  --------------------------------------------------------------------------------  No significant changes to PMH, PSH, FHx, SHx, unless otherwise noted    ALLERGIES & MEDICATIONS  --------------------------------------------------------------------------------  Allergies    No Known Allergies    Intolerances      Standing Inpatient Medications  carvedilol 6.25 milliGRAM(s) Oral every 12 hours  dextrose 5%. 1000 milliLiter(s) IV Continuous <Continuous>  dextrose 5%. 1000 milliLiter(s) IV Continuous <Continuous>  dextrose 50% Injectable 25 Gram(s) IV Push once  dextrose 50% Injectable 12.5 Gram(s) IV Push once  dextrose 50% Injectable 25 Gram(s) IV Push once  glucagon  Injectable 1 milliGRAM(s) IntraMuscular once  heparin   Injectable 5000 Unit(s) SubCutaneous every 12 hours  insulin glargine Injectable (LANTUS) 12 Unit(s) SubCutaneous at bedtime  insulin lispro (ADMELOG) corrective regimen sliding scale   SubCutaneous three times a day before meals  insulin lispro Injectable (ADMELOG) 4 Unit(s) SubCutaneous three times a day before meals  NIFEdipine XL 30 milliGRAM(s) Oral daily  pantoprazole    Tablet 40 milliGRAM(s) Oral before breakfast  sodium bicarbonate 1300 milliGRAM(s) Oral three times a day  sodium bicarbonate  Infusion 0.054 mEq/kG/Hr IV Continuous <Continuous>    PRN Inpatient Medications  acetaminophen     Tablet .. 650 milliGRAM(s) Oral every 6 hours PRN  dextrose Oral Gel 15 Gram(s) Oral once PRN          VITALS/PHYSICAL EXAM  --------------------------------------------------------------------------------  T(C): 36.9 (12-30-24 @ 04:34), Max: 37.1 (12-29-24 @ 11:48)  HR: 85 (12-30-24 @ 04:34) (68 - 85)  BP: 168/67 (12-30-24 @ 04:34) (103/61 - 168/67)  RR: 18 (12-30-24 @ 04:34) (17 - 18)  SpO2: 95% (12-30-24 @ 04:34) (95% - 95%)  Wt(kg): --        12-29-24 @ 07:01  -  12-30-24 @ 07:00  --------------------------------------------------------  IN: 2540 mL / OUT: 0 mL / NET: 2540 mL      Physical Exam:  	Gen: NAD  	Pulm: CTA B/L  	CV: S1S2; no rub  	Abd: +distended  	LE: no edema  	    LABS/STUDIES  --------------------------------------------------------------------------------              9.5    10.11 >-----------<  244      [12-29-24 @ 09:14]              30.1     135  |  103  |  78  ----------------------------<  123      [12-29-24 @ 09:14]  3.6   |  15  |  6.0        Ca     7.0     [12-29-24 @ 09:14]      Mg     2.0     [12-29-24 @ 09:14]      Phos  5.1     [12-29-24 @ 09:14]    TPro  5.8  /  Alb  3.0  /  TBili  0.4  /  DBili  x   /  AST  6   /  ALT  5   /  AlkPhos  70  [12-29-24 @ 09:14]    PT/INR: PT 13.50, INR 1.14       [12-29-24 @ 09:14]  PTT: 28.5       [12-29-24 @ 09:14]      Creatinine Trend:  SCr 6.0 [12-29 @ 09:14]  SCr 6.5 [12-28 @ 17:07]  SCr 6.6 [12-28 @ 08:40]  SCr 6.7 [12-27 @ 20:15]  SCr 6.9 [12-27 @ 10:30]    Urinalysis - [12-29-24 @ 09:14]      Color  / Appearance  / SG  / pH       Gluc 123 / Ketone   / Bili  / Urobili        Blood  / Protein  / Leuk Est  / Nitrite       RBC  / WBC  / Hyaline  / Gran  / Sq Epi  / Non Sq Epi  / Bacteria         HBsAg Nonreact      [12-27-24 @ 21:00]  HCV 0.10, Nonreact      [12-27-24 @ 21:00]    
Nephrology progress note    Patient is seen and examined, events over the last 24 h noted .  Lying in bed comfortable     Allergies:  No Known Allergies    Hospital Medications:   MEDICATIONS  (STANDING):  carvedilol 6.25 milliGRAM(s) Oral every 12 hours  glucagon  Injectable 1 milliGRAM(s) IntraMuscular once  heparin   Injectable 5000 Unit(s) SubCutaneous every 12 hours  insulin glargine Injectable (LANTUS) 12 Unit(s) SubCutaneous at bedtime  insulin lispro (ADMELOG) corrective regimen sliding scale   SubCutaneous three times a day before meals  insulin lispro Injectable (ADMELOG) 4 Unit(s) SubCutaneous three times a day before meals  NIFEdipine XL 30 milliGRAM(s) Oral daily  pantoprazole    Tablet 40 milliGRAM(s) Oral before breakfast  sodium bicarbonate  Infusion 0.04 mEq/kG/Hr (75 mL/Hr) IV Continuous <Continuous>  sodium zirconium cyclosilicate 10 Gram(s) Oral every 8 hours        VITALS:  T(F): 98.6 (12-28-24 @ 08:04), Max: 99.2 (12-28-24 @ 02:41)  HR: 73 (12-28-24 @ 08:04)  BP: 146/70 (12-28-24 @ 08:04)  RR: 16 (12-28-24 @ 08:04)  SpO2: 97% (12-28-24 @ 08:04)      12-27 @ 07:01  -  12-28 @ 07:00  --------------------------------------------------------  IN: 495 mL / OUT: 0 mL / NET: 495 mL    12-28 @ 07:01  -  12-28 @ 12:30  --------------------------------------------------------  IN: 450 mL / OUT: 0 mL / NET: 450 mL          PHYSICAL EXAM:  Constitutional: NAD  Respiratory: CTAB, no wheezes, rales or rhonchi  Cardiovascular: S1, S2, RRR  Gastrointestinal: BS+, soft, NT/ND  Extremities: No cyanosis or clubbing. No peripheral edema  :  No infante.   Skin: No rashes    LABS:  12-28    133[L]  |  104  |  84[HH]  ----------------------------<  108[H]  4.4   |  11[L]  |  6.6[HH]  Creatinine Trend: 6.6<--, 6.7<--, 6.9<--, 6.6<--  Ca    7.4[L]      28 Dec 2024 08:40  Phos  5.3     12-28  Mg     2.3     12-28    Uric Acid: 9.3 mg/dL (12.27.24 @ 20:15)      TPro  6.0  /  Alb  3.0[L]  /  TBili  0.4  /  DBili      /  AST  8   /  ALT  6   /  AlkPhos  76  12-28                          9.8    9.79  )-----------( 234      ( 28 Dec 2024 08:40 )             31.5       Urine Studies:  Urinalysis Basic - ( 28 Dec 2024 08:40 )    Color:  / Appearance:  / SG:  / pH:   Gluc: 108 mg/dL / Ketone:   / Bili:  / Urobili:    Blood:  / Protein:  / Nitrite:    Leuk Esterase:  / RBC:  / WBC    Sq Epi:  / Non Sq Epi:  / Bacteria:             HBsAg Nonreact      [12-27-24 @ 21:00]  HCV 0.10, Nonreact      [12-27-24 @ 21:00]        RADIOLOGY & ADDITIONAL STUDIES:  
Patient is a 69y old  Female who presents with a chief complaint of abnormal labs (29 Dec 2024 12:53)      Patient seen and examined at bedside.    ALLERGIES:  No Known Allergies    MEDICATIONS:  acetaminophen     Tablet .. 650 milliGRAM(s) Oral every 6 hours PRN  carvedilol 6.25 milliGRAM(s) Oral every 12 hours  dextrose 5%. 1000 milliLiter(s) IV Continuous <Continuous>  dextrose 5%. 1000 milliLiter(s) IV Continuous <Continuous>  dextrose 50% Injectable 25 Gram(s) IV Push once  dextrose 50% Injectable 12.5 Gram(s) IV Push once  dextrose 50% Injectable 25 Gram(s) IV Push once  dextrose Oral Gel 15 Gram(s) Oral once PRN  glucagon  Injectable 1 milliGRAM(s) IntraMuscular once  heparin   Injectable 5000 Unit(s) SubCutaneous every 12 hours  insulin glargine Injectable (LANTUS) 12 Unit(s) SubCutaneous at bedtime  insulin lispro (ADMELOG) corrective regimen sliding scale   SubCutaneous three times a day before meals  insulin lispro Injectable (ADMELOG) 4 Unit(s) SubCutaneous three times a day before meals  NIFEdipine XL 30 milliGRAM(s) Oral daily  pantoprazole    Tablet 40 milliGRAM(s) Oral before breakfast  sodium bicarbonate 1300 milliGRAM(s) Oral three times a day  sodium bicarbonate  Infusion 0.054 mEq/kG/Hr IV Continuous <Continuous>    Vital Signs Last 24 Hrs  T(F): 98.7 (29 Dec 2024 11:48), Max: 99.4 (29 Dec 2024 05:15)  HR: 76 (29 Dec 2024 17:30) (68 - 76)  BP: 147/76 (29 Dec 2024 17:30) (103/61 - 168/67)  RR: 18 (29 Dec 2024 11:48) (17 - 18)  SpO2: 95% (29 Dec 2024 08:56) (95% - 98%)  I&O's Summary    28 Dec 2024 07:01  -  29 Dec 2024 07:00  --------------------------------------------------------  IN: 1531 mL / OUT: 1950 mL / NET: -419 mL    29 Dec 2024 07:01  -  29 Dec 2024 18:06  --------------------------------------------------------  IN: 1000 mL / OUT: 0 mL / NET: 1000 mL        PHYSICAL EXAM:  General: NAD, A/O x 3  ENT: MMM  Neck: Supple, No JVD  Lungs: Clear to auscultation bilaterally  Cardio: RRR, S1/S2, No murmurs  Abdomen: Soft, Nontender, Nondistended; Bowel sounds present  Extremities: No cyanosis, No edema    LABS:                        9.5    10.11 )-----------( 244      ( 29 Dec 2024 09:14 )             30.1     12-29    135  |  103  |  78  ----------------------------<  123  3.6   |  15  |  6.0    Ca    7.0      29 Dec 2024 09:14  Phos  5.1     12-29  Mg     2.0     12-29    TPro  5.8  /  Alb  3.0  /  TBili  0.4  /  DBili  x   /  AST  6   /  ALT  5   /  AlkPhos  70  12-29      PT/INR - ( 29 Dec 2024 09:14 )   PT: 13.50 sec;   INR: 1.14 ratio         PTT - ( 29 Dec 2024 09:14 )  PTT:28.5 sec            08:48 - VBG - pH: 7.16  | pCO2: 32    | pO2: 30    | Lactate: 0.6              POCT Blood Glucose.: 139 mg/dL (29 Dec 2024 16:52)  POCT Blood Glucose.: 216 mg/dL (29 Dec 2024 11:49)  POCT Blood Glucose.: 122 mg/dL (29 Dec 2024 07:54)  POCT Blood Glucose.: 134 mg/dL (28 Dec 2024 21:08)      Urinalysis Basic - ( 29 Dec 2024 09:14 )    Color: x / Appearance: x / SG: x / pH: x  Gluc: 123 mg/dL / Ketone: x  / Bili: x / Urobili: x   Blood: x / Protein: x / Nitrite: x   Leuk Esterase: x / RBC: x / WBC x   Sq Epi: x / Non Sq Epi: x / Bacteria: x            RADIOLOGY & ADDITIONAL TESTS:    Care Discussed with Consultants/Other Providers: 
SUBJECTIVE/OVERNIGHT EVENTS  Today is hospital day 2d. This morning patient was seen and examined at bedside, resting comfortably in bed. No acute or major events overnight.    CODE STATUS: full code     FAMILY COMMUNICATION  Contact date:  Name of person contacted:  Relationship to patient:  Communication details:    MEDICATIONS  STANDING MEDICATIONS  carvedilol 6.25 milliGRAM(s) Oral every 12 hours  dextrose 5%. 1000 milliLiter(s) IV Continuous <Continuous>  dextrose 5%. 1000 milliLiter(s) IV Continuous <Continuous>  dextrose 50% Injectable 25 Gram(s) IV Push once  dextrose 50% Injectable 12.5 Gram(s) IV Push once  dextrose 50% Injectable 25 Gram(s) IV Push once  glucagon  Injectable 1 milliGRAM(s) IntraMuscular once  heparin   Injectable 5000 Unit(s) SubCutaneous every 12 hours  insulin glargine Injectable (LANTUS) 12 Unit(s) SubCutaneous at bedtime  insulin lispro (ADMELOG) corrective regimen sliding scale   SubCutaneous three times a day before meals  insulin lispro Injectable (ADMELOG) 4 Unit(s) SubCutaneous three times a day before meals  NIFEdipine XL 30 milliGRAM(s) Oral daily  pantoprazole    Tablet 40 milliGRAM(s) Oral before breakfast  sodium bicarbonate 1300 milliGRAM(s) Oral three times a day  sodium bicarbonate  Infusion 0.054 mEq/kG/Hr IV Continuous <Continuous>  sodium zirconium cyclosilicate 10 Gram(s) Oral every 8 hours    PRN MEDICATIONS  acetaminophen     Tablet .. 650 milliGRAM(s) Oral every 6 hours PRN  dextrose Oral Gel 15 Gram(s) Oral once PRN    VITALS  T(F): 98.7 (12-28-24 @ 20:20), Max: 99.2 (12-28-24 @ 02:41)  HR: 74 (12-28-24 @ 20:20) (67 - 85)  BP: 129/70 (12-28-24 @ 20:20) (107/63 - 165/87)  RR: 18 (12-28-24 @ 20:20) (16 - 18)  SpO2: 97% (12-28-24 @ 08:04) (97% - 98%)  POCT Blood Glucose.: 134 mg/dL (12-28-24 @ 21:08)  POCT Blood Glucose.: 115 mg/dL (12-28-24 @ 16:47)  POCT Blood Glucose.: 149 mg/dL (12-28-24 @ 11:35)  POCT Blood Glucose.: 114 mg/dL (12-28-24 @ 07:46)    PHYSICAL EXAM  GENERAL  (  ) NAD, lying in bed comfortably     (  ) obtunded     (  ) lethargic     (  ) somnolent    HEAD  (  ) Atraumatic     (  ) hematoma     (  ) laceration (specify location:       )     NECK  (  ) Supple     (  ) neck stiffness     (  ) nuchal rigidity     (  )  no JVD     (  ) JVD present ( -- cm)    HEART  Rate -->  (  ) normal rate    (  ) bradycardic    (  ) tachycardic  Rhythm -->  (  ) regular    (  ) regularly irregular    (  ) irregularly irregular  Murmurs -->  (  ) normal s1/s2    (  ) systolic murmur    (  ) diastolic murmur    (  ) continuous murmur     (  ) S3 present    (  ) S4 present    LUNGS  (  )Unlabored respirations     (  ) tachypnea  (  ) B/L air entry     (  ) decreased breath sounds in:  (location     )    (  ) no adventitious sound     (  ) crackles     (  ) wheezing      (  ) rhonchi      (specify location:       )  (  ) chest wall tenderness (specify location:       )    ABDOMEN  (  ) Soft     (  ) tense   |   (  ) nondistended     (  ) distended   |   (  ) +BS     (  ) hypoactive bowel sounds     (  ) hyperactive bowel sounds  (  ) nontender     (  ) RUQ tenderness     (  ) RLQ tenderness     (  ) LLQ tenderness     (  ) epigastric tenderness     (  ) diffuse tenderness  (  ) Splenomegaly      (  ) Hepatomegaly      (  ) Jaundice     (  ) ecchymosis     EXTREMITIES  (  ) Normal     (  ) Rash     (  ) ecchymosis     (  ) varicose veins      (  ) pitting edema     (  ) non-pitting edema   (  ) ulceration     (  ) gangrene:     (location:     )    NERVOUS SYSTEM  (  ) A&Ox3     (  ) confused     (  ) lethargic  CN II-XII:     (  ) Intact     (  ) focal deficits  (Specify:     )   Upper extremities:     (  ) strength X/5     (  ) focal deficit (specify:    )  Lower extremities:     (  ) strength  X/5    (  ) focal deficit (specify:    )    SKIN  (  ) No rashes or lesions     (  ) maculopapular rash     (  ) pustules     (  ) vesicles     (  ) ulcer     (  ) ecchymosis     (specify location:     )    (  ) Indwelling Cardenas Catheter   Date insterted:    Reason (  ) Critical illness     (  ) urinary retention    (  ) Accurate Ins/Outs Monitoring     (  ) CMO patient    (  ) Central Line  Date inserted:  Location: (  ) Right IJ   (  ) Left IJ   (  ) Right Fem   (  ) Left Fem    (  ) SPC  (  ) pigtail  (  ) PEG tube  (  ) colostomy  (  ) jejunostomy  (  ) U-Dall    LABS             9.8    9.79  )-----------( 234      ( 12-28-24 @ 08:40 )             31.5     133  |  104  |  83  -------------------------<  97   12-28-24 @ 17:07  4.2  |  12  |  6.5    Ca      6.9     12-28-24 @ 17:07  Phos   5.3     12-28-24 @ 08:40  Mg     2.3     12-28-24 @ 08:40    TPro  5.8  /  Alb  2.9  /  TBili  0.2  /  DBili  x   /  AST  7   /  ALT  5   /  AlkPhos  75  /  GGT  x     12-28-24 @ 17:07        Urinalysis Basic - ( 28 Dec 2024 17:07 )    Color: x / Appearance: x / SG: x / pH: x  Gluc: 97 mg/dL / Ketone: x  / Bili: x / Urobili: x   Blood: x / Protein: x / Nitrite: x   Leuk Esterase: x / RBC: x / WBC x   Sq Epi: x / Non Sq Epi: x / Bacteria: x          IMAGING
SUBJECTIVE/OVERNIGHT EVENTS  Today is hospital day 3d. This morning patient was seen and examined at bedside, resting comfortably in bed. No acute or major events overnight. Pt denies any new complaints.    MEDICATIONS  STANDING MEDICATIONS  carvedilol 6.25 milliGRAM(s) Oral every 12 hours  dextrose 5%. 1000 milliLiter(s) IV Continuous <Continuous>  dextrose 5%. 1000 milliLiter(s) IV Continuous <Continuous>  dextrose 50% Injectable 25 Gram(s) IV Push once  dextrose 50% Injectable 12.5 Gram(s) IV Push once  dextrose 50% Injectable 25 Gram(s) IV Push once  glucagon  Injectable 1 milliGRAM(s) IntraMuscular once  heparin   Injectable 5000 Unit(s) SubCutaneous every 12 hours  insulin glargine Injectable (LANTUS) 12 Unit(s) SubCutaneous at bedtime  insulin lispro (ADMELOG) corrective regimen sliding scale   SubCutaneous three times a day before meals  insulin lispro Injectable (ADMELOG) 4 Unit(s) SubCutaneous three times a day before meals  NIFEdipine XL 30 milliGRAM(s) Oral daily  pantoprazole    Tablet 40 milliGRAM(s) Oral before breakfast  sodium bicarbonate 1300 milliGRAM(s) Oral three times a day  sodium bicarbonate  Infusion 0.054 mEq/kG/Hr IV Continuous <Continuous>    PRN MEDICATIONS  acetaminophen     Tablet .. 650 milliGRAM(s) Oral every 6 hours PRN  dextrose Oral Gel 15 Gram(s) Oral once PRN    VITALS  T(F): 99.6 (12-30-24 @ 10:30), Max: 99.6 (12-30-24 @ 10:30)  HR: 80 (12-30-24 @ 10:30) (73 - 85)  BP: 159/67 (12-30-24 @ 10:30) (126/67 - 168/67)  RR: 16 (12-30-24 @ 10:30) (16 - 18)  SpO2: 96% (12-30-24 @ 10:30) (95% - 96%)  POCT Blood Glucose.: 159 mg/dL (12-30-24 @ 07:33)  POCT Blood Glucose.: 152 mg/dL (12-29-24 @ 21:06)  POCT Blood Glucose.: 139 mg/dL (12-29-24 @ 16:52)    PHYSICAL EXAM  GENERAL: NAD, lying in bed comfortably  HEAD:  Atraumatic, normocephalic  HEART: Regular rate and rhythm, no murmurs, rubs, or gallops  LUNGS: Unlabored respirations.  Clear to auscultation bilaterally, no crackles, wheezing, or rhonchi  ABDOMEN: Soft, nontender, nondistended, +BS  EXTREMITIES: No LE edema    LABS             QNS    QNS   )-----------( QNS      ( 12-30-24 @ 07:23 )             QNS      137  |  100  |  79  -------------------------<  152   12-30-24 @ 07:23  3.8  |  19  |  6.3    Ca      6.8     12-30-24 @ 07:23  Phos   5.1     12-29-24 @ 09:14  Mg     1.9     12-30-24 @ 07:23    TPro  5.8  /  Alb  3.0  /  TBili  0.4  /  DBili  x   /  AST  6   /  ALT  5   /  AlkPhos  70  /  GGT  x     12-29-24 @ 09:14    PT/INR - ( 12-30-24 @ 07:23 )   PT: 13.70 sec[H];   INR: 1.16 ratio  PTT - ( 12-30-24 @ 07:23 )  PTT:29.2 sec      Urinalysis Basic - ( 30 Dec 2024 07:23 )    Color: x / Appearance: x / SG: x / pH: x  Gluc: 152 mg/dL / Ketone: x  / Bili: x / Urobili: x   Blood: x / Protein: x / Nitrite: x   Leuk Esterase: x / RBC: x / WBC x   Sq Epi: x / Non Sq Epi: x / Bacteria: x

## 2025-01-02 NOTE — DISCHARGE NOTE NURSING/CASE MANAGEMENT/SOCIAL WORK - NSDCFUADDAPPT_GEN_ALL_CORE_FT
Dayton Anand at [address] [place] [address] on 1/6 at 3pm     Please follow up with Dr. Webber regarding your biopsy results.

## 2025-01-02 NOTE — PROGRESS NOTE ADULT - PROVIDER SPECIALTY LIST ADULT
Hospitalist
Hospitalist
Internal Medicine
Internal Medicine
Nephrology
Hospitalist
Hospitalist
Intervent Radiology
Nephrology
Nephrology
Hospitalist
Internal Medicine
Nephrology

## 2025-01-02 NOTE — DISCHARGE NOTE NURSING/CASE MANAGEMENT/SOCIAL WORK - PATIENT PORTAL LINK FT
You can access the FollowMyHealth Patient Portal offered by [facility] by registering at the following website: [email or web address]. By joining [facility]’s FollowMyHealth portal, you will also be able to view your health information using other applications (apps) compatible with our system.

## 2025-01-02 NOTE — DISCHARGE NOTE PROVIDER - HOSPITAL COURSE
60 y/o woman with PMH of HTN, hyperlipidemia, DM and CKD 5 for several years presented for hyperkalemia and was admitted for further management.    Summary: patient with a history of chronic kidney disease stage 5 presented with acute kidney injury superimposed on their baseline CKD, manifested by uremia, hyperkalemia, and a mixed high anion gap metabolic acidosis and non-anion gap metabolic acidosis. The hyperkalemia, initially moderate, has now resolved. An abdominal mass was discovered, and subsequently biopsied by interventional radiology on 12/30, with results pending. The patient's creatinine, initially elevated, has trended downwards and is currently 5.9. They are now non-oliguric. Bicarbonate drip was stopped on 12/31, and the patient is now taking oral sodium bicarbonate 1300mg every 8 hours. Lokelma has been discontinued. Parathyroid hormone is elevated at 256. Hepatitis panel and serum uric acid are 9.3. Nephrology follow-up is scheduled for Monday, 1/6/25 at 3 pm to discuss ongoing management, as the patient will require continued outpatient nephrology care, though there is no urgent need for renal replacement therapy at this time.    Discussion of discharge plan of care, including discharge diagnoses, medication reconciliation, and follow-ups was conducted with Dr. Avery 1/2/24, and discharge was approved.    68 y/o woman with PMH of HTN, hyperlipidemia, DM and CKD 5 for several years presented for hyperkalemia and was admitted for further management.    Summary: patient with a history of chronic kidney disease stage 5 presented with acute kidney injury superimposed on their baseline CKD, manifested by uremia, hyperkalemia, and a mixed high anion gap metabolic acidosis and non-anion gap metabolic acidosis. The hyperkalemia, initially moderate, has now resolved. An abdominal mass was discovered, and subsequently biopsied by interventional radiology on 12/30, with results pending. The patient's creatinine, initially elevated, has trended downwards and is currently 5.9. They are now non-oliguric. Bicarbonate drip was stopped on 12/31, and the patient is now taking oral sodium bicarbonate 1300mg every 8 hours. Lokelma has been discontinued. Parathyroid hormone is elevated at 256. Hepatitis panel and serum uric acid are 9.3. Nephrology follow-up is scheduled for Monday, 1/6/25 at 3 pm to discuss ongoing management, as the patient will require continued outpatient nephrology care, though there is no urgent need for renal replacement therapy at this time.    Discussion of discharge plan of care, including discharge diagnoses, medication reconciliation, and follow-ups was conducted with Dr. Avery 1/2/24, and discharge was approved.

## 2025-01-02 NOTE — DISCHARGE NOTE PROVIDER - CARE PROVIDER_API CALL
Mary Webber  Complex General Surgical Oncology  [address], [place] [address] [place] [address]  Phone: [phone]  Fax: [phone]  Follow Up Time: 2 weeks    Kathryn Yadav  Nephrology  [address] [place] [address]  Phone: [phone]  Fax: [phone]  Follow Up Time: 1 week

## 2025-01-02 NOTE — PROGRESS NOTE ADULT - REASON FOR ADMISSION
Abnormal labs

## 2025-01-02 NOTE — DISCHARGE NOTE PROVIDER - NSDCMRMEDTOKEN_GEN_ALL_CORE_FT
Aldactone 50 mg oral tablet: 1 tab(s) orally once a day  Bumex 2 mg oral tablet: 1 tab(s) orally once a day  carvedilol 6.25 mg oral tablet: 1 tab(s) orally 2 times a day  HumaLOG KwikPen 100 units/mL injectable solution: 8 unit(s) injectable 3 times a day  insulin glargine 100 units/mL subcutaneous solution: 24 unit(s) subcutaneous once a day (at bedtime)  NIFEdipine 30 mg oral tablet, extended release: 1 tab(s) orally once a day  Tylenol Regular Strength 325 mg oral tablet: 2 tab(s) orally prn   calcitriol 0.25 mcg oral capsule: 1 cap(s) orally once a day  carvedilol 6.25 mg oral tablet: 1 tab(s) orally 2 times a day  HumaLOG KwikPen 100 units/mL injectable solution: 8 unit(s) injectable 3 times a day  insulin glargine 100 units/mL subcutaneous solution: 24 unit(s) subcutaneous once a day (at bedtime)  NIFEdipine 30 mg oral tablet, extended release: 1 tab(s) orally once a day  sodium bicarbonate 650 mg oral tablet: 2 tab(s) orally 3 times a day  Tylenol Regular Strength 325 mg oral tablet: 2 tab(s) orally prn

## 2025-01-02 NOTE — DISCHARGE NOTE PROVIDER - NSDCFUADDAPPT_GEN_ALL_CORE_FT
Dayton Anand at [address] [place] [address] on 1/6 at 3pm  Dayton Anand at [address] [place] [address] on 1/6 at 3pm     Please follow up with Dr. Webber regarding your biopsy results.

## 2025-01-02 NOTE — DISCHARGE NOTE PROVIDER - NSDCFUSCHEDAPPT_GEN_ALL_CORE_FT
Mary Webber  [facility]  [facility] [address]  Scheduled Appointment: 01/14/2025

## 2025-01-02 NOTE — PROGRESS NOTE ADULT - ASSESSMENT
68 y/o female with PMHx of hypertension hyperlipidemia diabetes CKD stage V referred from interventional radiology for abnormal labs   # JASMINE on CKD 5 vs CKD 5 progression   # HAGMA and NAGMA  # Hyperkalemia moderate   # abdominal mass needing biopsy  - cr stable   - non oliguric   -  bp controlled   - d/c bicarbonate drip   - cont  sodium bicarb po 1300 mg q8h   - off lokelma   - check iron stores   -IP noted no binders / corrected calcium around 7.8  pth 256/ on  calcitriol 0.25 q 24/. also replete vit D   - followed by IR : Upper portion of fat-containing mass corresponding to area of FDG-avidity was biopsied using a 20G 15 cm coaxial needle system with CT-guidance.  Seven 20G tissue cores obtained and sent for pathology/ f/up pathology   - no urgent need for RRT but will follow closely / IR for vein mapping   will follow    70 y/o female with PMHx of hypertension hyperlipidemia diabetes CKD stage V referred from interventional radiology for abnormal labs   # JASMINE on CKD 5 vs CKD 5 progression   # HAGMA and NAGMA  # Hyperkalemia moderate   # abdominal mass needing biopsy  - cr stable   - non oliguric   -  bp controlled   - off bicarbonate drip   - cont  sodium bicarb po 1300 mg q8h   - off lokelma   - check iron stores   -IP noted no binders / corrected calcium around 7.8  pth 256/ on  calcitriol 0.25 q 24/. also replete vit D   - followed by IR : Upper portion of fat-containing mass corresponding to area of FDG-avidity was biopsied using a 20G 15 cm coaxial needle system with CT-guidance.  Seven 20G tissue cores obtained and sent for pathology/ f/up pathology   - no urgent need for RRT but will follow closely / IR for vein mapping   will follow

## 2025-01-02 NOTE — CHART NOTE - NSCHARTNOTEFT_GEN_A_CORE
Patient was able to ambulate with walker without problem. Patient states she feels comfortable ambulation does not state that there is any difficulty.

## 2025-01-06 LAB — NON-GYNECOLOGICAL CYTOLOGY STUDY: SIGNIFICANT CHANGE UP

## 2025-01-14 ENCOUNTER — APPOINTMENT (OUTPATIENT)
Dept: SURGERY | Facility: CLINIC | Age: 70
End: 2025-01-14
Payer: MEDICARE

## 2025-01-14 VITALS
HEIGHT: 62 IN | OXYGEN SATURATION: 98 % | WEIGHT: 202 LBS | DIASTOLIC BLOOD PRESSURE: 86 MMHG | SYSTOLIC BLOOD PRESSURE: 132 MMHG | BODY MASS INDEX: 37.17 KG/M2 | HEART RATE: 92 BPM | TEMPERATURE: 97 F

## 2025-01-14 DIAGNOSIS — D63.1 ANEMIA IN CHRONIC KIDNEY DISEASE: ICD-10-CM

## 2025-01-14 DIAGNOSIS — Z95.5 PRESENCE OF CORONARY ANGIOPLASTY IMPLANT AND GRAFT: ICD-10-CM

## 2025-01-14 DIAGNOSIS — N17.9 ACUTE KIDNEY FAILURE, UNSPECIFIED: ICD-10-CM

## 2025-01-14 DIAGNOSIS — Z79.4 LONG TERM (CURRENT) USE OF INSULIN: ICD-10-CM

## 2025-01-14 DIAGNOSIS — E87.5 HYPERKALEMIA: ICD-10-CM

## 2025-01-14 DIAGNOSIS — R19.09 OTHER INTRA-ABDOMINAL AND PELVIC SWELLING, MASS AND LUMP: ICD-10-CM

## 2025-01-14 DIAGNOSIS — E87.20 ACIDOSIS, UNSPECIFIED: ICD-10-CM

## 2025-01-14 DIAGNOSIS — R19.00 INTRA-ABDOMINAL AND PELVIC SWELLING, MASS AND LUMP, UNSPECIFIED SITE: ICD-10-CM

## 2025-01-14 DIAGNOSIS — I12.0 HYPERTENSIVE CHRONIC KIDNEY DISEASE WITH STAGE 5 CHRONIC KIDNEY DISEASE OR END STAGE RENAL DISEASE: ICD-10-CM

## 2025-01-14 DIAGNOSIS — E11.22 TYPE 2 DIABETES MELLITUS WITH DIABETIC CHRONIC KIDNEY DISEASE: ICD-10-CM

## 2025-01-14 DIAGNOSIS — N18.6 END STAGE RENAL DISEASE: ICD-10-CM

## 2025-01-14 DIAGNOSIS — E78.5 HYPERLIPIDEMIA, UNSPECIFIED: ICD-10-CM

## 2025-01-14 PROCEDURE — 99215 OFFICE O/P EST HI 40 MIN: CPT

## 2025-03-25 ENCOUNTER — NON-APPOINTMENT (OUTPATIENT)
Age: 70
End: 2025-03-25

## 2025-03-28 ENCOUNTER — OUTPATIENT (OUTPATIENT)
Dept: OUTPATIENT SERVICES | Facility: HOSPITAL | Age: 70
LOS: 1 days | End: 2025-03-28
Payer: MEDICARE

## 2025-03-28 ENCOUNTER — RESULT REVIEW (OUTPATIENT)
Age: 70
End: 2025-03-28

## 2025-03-28 DIAGNOSIS — Z98.891 HISTORY OF UTERINE SCAR FROM PREVIOUS SURGERY: Chronic | ICD-10-CM

## 2025-03-28 DIAGNOSIS — R19.00 INTRA-ABDOMINAL AND PELVIC SWELLING, MASS AND LUMP, UNSPECIFIED SITE: ICD-10-CM

## 2025-03-28 DIAGNOSIS — Z95.5 PRESENCE OF CORONARY ANGIOPLASTY IMPLANT AND GRAFT: Chronic | ICD-10-CM

## 2025-03-28 DIAGNOSIS — Z98.890 OTHER SPECIFIED POSTPROCEDURAL STATES: Chronic | ICD-10-CM

## 2025-03-28 DIAGNOSIS — Z00.8 ENCOUNTER FOR OTHER GENERAL EXAMINATION: ICD-10-CM

## 2025-03-28 LAB — GLUCOSE BLDC GLUCOMTR-MCNC: 53 MG/DL — CRITICAL LOW (ref 70–99)

## 2025-03-28 PROCEDURE — A9552: CPT

## 2025-03-28 PROCEDURE — 82962 GLUCOSE BLOOD TEST: CPT

## 2025-03-28 PROCEDURE — 78815 PET IMAGE W/CT SKULL-THIGH: CPT | Mod: 26,PI

## 2025-03-28 PROCEDURE — 78815 PET IMAGE W/CT SKULL-THIGH: CPT | Mod: PI

## 2025-03-29 DIAGNOSIS — R19.00 INTRA-ABDOMINAL AND PELVIC SWELLING, MASS AND LUMP, UNSPECIFIED SITE: ICD-10-CM

## 2025-04-15 ENCOUNTER — APPOINTMENT (OUTPATIENT)
Dept: SURGERY | Facility: CLINIC | Age: 70
End: 2025-04-15

## 2025-04-18 ENCOUNTER — OUTPATIENT (OUTPATIENT)
Dept: OUTPATIENT SERVICES | Facility: HOSPITAL | Age: 70
LOS: 1 days | Discharge: HOME | End: 2025-04-18
Payer: MEDICARE

## 2025-04-18 ENCOUNTER — TRANSCRIPTION ENCOUNTER (OUTPATIENT)
Age: 70
End: 2025-04-18

## 2025-04-18 VITALS
TEMPERATURE: 98 F | SYSTOLIC BLOOD PRESSURE: 164 MMHG | DIASTOLIC BLOOD PRESSURE: 70 MMHG | HEART RATE: 80 BPM | OXYGEN SATURATION: 96 % | RESPIRATION RATE: 18 BRPM

## 2025-04-18 VITALS
WEIGHT: 209.88 LBS | RESPIRATION RATE: 18 BRPM | HEIGHT: 62 IN | DIASTOLIC BLOOD PRESSURE: 63 MMHG | TEMPERATURE: 98 F | HEART RATE: 86 BPM | SYSTOLIC BLOOD PRESSURE: 127 MMHG | OXYGEN SATURATION: 96 %

## 2025-04-18 DIAGNOSIS — Z95.5 PRESENCE OF CORONARY ANGIOPLASTY IMPLANT AND GRAFT: Chronic | ICD-10-CM

## 2025-04-18 DIAGNOSIS — Z98.890 OTHER SPECIFIED POSTPROCEDURAL STATES: Chronic | ICD-10-CM

## 2025-04-18 DIAGNOSIS — Z98.891 HISTORY OF UTERINE SCAR FROM PREVIOUS SURGERY: Chronic | ICD-10-CM

## 2025-04-18 LAB — GLUCOSE BLDC GLUCOMTR-MCNC: 128 MG/DL — HIGH (ref 70–99)

## 2025-04-18 PROCEDURE — 77001 FLUOROGUIDE FOR VEIN DEVICE: CPT | Mod: 26

## 2025-04-18 PROCEDURE — 77001 FLUOROGUIDE FOR VEIN DEVICE: CPT

## 2025-04-18 PROCEDURE — C1750: CPT

## 2025-04-18 PROCEDURE — 36558 INSERT TUNNELED CV CATH: CPT | Mod: RT

## 2025-04-18 PROCEDURE — 82962 GLUCOSE BLOOD TEST: CPT

## 2025-04-18 PROCEDURE — 76937 US GUIDE VASCULAR ACCESS: CPT

## 2025-04-18 PROCEDURE — 76937 US GUIDE VASCULAR ACCESS: CPT | Mod: 26

## 2025-04-18 PROCEDURE — C1769: CPT

## 2025-04-18 RX ORDER — CEFAZOLIN SODIUM IN 0.9 % NACL 3 G/100 ML
2000 INTRAVENOUS SOLUTION, PIGGYBACK (ML) INTRAVENOUS ONCE
Refills: 0 | Status: DISCONTINUED | OUTPATIENT
Start: 2025-04-18 | End: 2025-04-18

## 2025-04-18 NOTE — H&P ADULT - NSHPPHYSICALEXAM_GEN_ALL_CORE
GENERAL: NAD,   HEAD: Atraumatic, Normocephalic  EYES: EOMI, PERRLA, conjunctiva and sclera clear  ENT: Moist mucous membranes  NECK: Supple, No JVD  CHEST/LUNG: Clear to auscultation bilaterally; No rales, rhonchi, wheezing, or  rubs. Unlabored respirations  HEART: Regular rate and rhythm; No murmurs, rubs, or gallops  ABDOMEN: BSx4; Soft, nontender, nondistended  EXTREMITIES: 2+ Peripheral Pulses, brisk capillary refill. No clubbing,  cyanosis, or edema  NERVOUS SYSTEM: A&Ox3, no focal deficits  SKIN: No rashes or lesions

## 2025-04-18 NOTE — H&P ADULT - HISTORY OF PRESENT ILLNESS
Patient is a 69-year-old female past medical history of hypertension, hyperlipidemia, diabetes and  CKD  presents to IR  today for TDC placement

## 2025-04-18 NOTE — ASU PREOP CHECKLIST - LAST TOOK
Patient No Show Kevin Aguiar, 08/23/23. Letter sent. Called patient No Answer,  Voicemailbox full. solids

## 2025-04-18 NOTE — H&P ADULT - NSHPREVIEWOFSYSTEMS_GEN_ALL_CORE
CONSTITUTIONAL: No weakness, fevers or chills  EYES/ENT: No visual changes; No vertigo or throat pain  NECK: No pain or stiffness  RESPIRATORY: No cough, wheezing, hemoptysis; No shortness of breath  CARDIOVASCULAR: No chest pain or palpitations  GASTROINTESTINAL: No abdominal or epigastric pain. No nausea, vomiting, or  hematemesis; No diarrhea or constipation. No melena or hematochezia.  GENITOURINARY: No dysuria, frequency or hematuria  NEUROLOGICAL: No numbness or weakness  SKIN: No itching, rashes

## 2025-04-18 NOTE — H&P ADULT - ASSESSMENT
Patient is a 69-year-old female past medical history of hypertension, hyperlipidemia, diabetes and  CKD  presents to IR  today for TDC placement   no

## 2025-04-18 NOTE — PROCEDURE NOTE - NSINFORMCONSENT_GEN_A_CORE
Benefits, risks, and possible complications of procedure explained to patient/caregiver who verbalized understanding and gave written consent. PAST SURGICAL HISTORY:  S/P foot surgery, left - removal of foreign body, 2013    S/P laparoscopic cholecystectomy 2013    S/P ORIF (open reduction internal fixation) fracture Right femur with IM Nail, 2015

## 2025-04-18 NOTE — ASU DISCHARGE PLAN (ADULT/PEDIATRIC) - FINANCIAL ASSISTANCE
[facility] provides services at a reduced cost to those who are determined to be eligible through [facility]’s financial assistance program. Information regarding [facility]’s financial assistance program can be found by going to [email or web address] or by calling [phone].

## 2025-04-18 NOTE — ASU PATIENT PROFILE, ADULT - FALL HARM RISK - RISK INTERVENTIONS

## 2025-04-18 NOTE — PROCEDURE NOTE - PROCEDURE FINDINGS AND DETAILS
Successful placement of a right sided hemodialysis catheter. Catheter is heparinized and ready for immediate use.

## 2025-04-18 NOTE — PRE PROCEDURE NOTE - PRE PROCEDURE EVALUATION
Vascular & Interventional Radiology Pre-Procedure Note    Procedure Name: Image guided tunneled dialysis catheter with sedation/anesthesia    Allergies: No Known Allergies      Exam  General: NAD, AAO x3, no distress  Chest: breathing comfortably on room air, CTAB  Abdomen: soft, non-tender, non- distended   Extremities: positive pulses bilaterally x4      Consentable: [x ] Yes   [ ] No     Plan:   -69y Female presents for Image guided tunneled dialysis catheter with sedation/anesthesia  -Risks/Benefits/alternatives explained with the patient and/or healthcare proxy and witnessed informed consent obtained.

## 2025-04-21 ENCOUNTER — APPOINTMENT (OUTPATIENT)
Dept: TRANSPLANT | Facility: CLINIC | Age: 70
End: 2025-04-21

## 2025-04-21 ENCOUNTER — APPOINTMENT (OUTPATIENT)
Dept: NEPHROLOGY | Facility: CLINIC | Age: 70
End: 2025-04-21

## 2025-05-03 ENCOUNTER — INPATIENT (INPATIENT)
Facility: HOSPITAL | Age: 70
LOS: 2 days | Discharge: HOME HEALTH | DRG: 312 | End: 2025-05-06
Attending: STUDENT IN AN ORGANIZED HEALTH CARE EDUCATION/TRAINING PROGRAM | Admitting: STUDENT IN AN ORGANIZED HEALTH CARE EDUCATION/TRAINING PROGRAM
Payer: MEDICARE

## 2025-05-03 VITALS
DIASTOLIC BLOOD PRESSURE: 75 MMHG | OXYGEN SATURATION: 95 % | TEMPERATURE: 98 F | HEIGHT: 62 IN | WEIGHT: 210.1 LBS | HEART RATE: 80 BPM | RESPIRATION RATE: 16 BRPM | SYSTOLIC BLOOD PRESSURE: 133 MMHG

## 2025-05-03 DIAGNOSIS — Z95.5 PRESENCE OF CORONARY ANGIOPLASTY IMPLANT AND GRAFT: Chronic | ICD-10-CM

## 2025-05-03 DIAGNOSIS — Z98.891 HISTORY OF UTERINE SCAR FROM PREVIOUS SURGERY: Chronic | ICD-10-CM

## 2025-05-03 DIAGNOSIS — Z98.890 OTHER SPECIFIED POSTPROCEDURAL STATES: Chronic | ICD-10-CM

## 2025-05-03 LAB
ALBUMIN SERPL ELPH-MCNC: 4.1 G/DL — SIGNIFICANT CHANGE UP (ref 3.5–5.2)
ALP SERPL-CCNC: 73 U/L — SIGNIFICANT CHANGE UP (ref 30–115)
ALT FLD-CCNC: 6 U/L — SIGNIFICANT CHANGE UP (ref 0–41)
ANION GAP SERPL CALC-SCNC: 14 MMOL/L — SIGNIFICANT CHANGE UP (ref 7–14)
AST SERPL-CCNC: 16 U/L — SIGNIFICANT CHANGE UP (ref 0–41)
BASOPHILS # BLD AUTO: 0.08 K/UL — SIGNIFICANT CHANGE UP (ref 0–0.2)
BASOPHILS NFR BLD AUTO: 0.7 % — SIGNIFICANT CHANGE UP (ref 0–1)
BILIRUB SERPL-MCNC: 0.4 MG/DL — SIGNIFICANT CHANGE UP (ref 0.2–1.2)
BUN SERPL-MCNC: 15 MG/DL — SIGNIFICANT CHANGE UP (ref 10–20)
CALCIUM SERPL-MCNC: 8.8 MG/DL — SIGNIFICANT CHANGE UP (ref 8.4–10.5)
CHLORIDE SERPL-SCNC: 93 MMOL/L — LOW (ref 98–110)
CO2 SERPL-SCNC: 28 MMOL/L — SIGNIFICANT CHANGE UP (ref 17–32)
CREAT SERPL-MCNC: 2.8 MG/DL — HIGH (ref 0.7–1.5)
EGFR: 18 ML/MIN/1.73M2 — LOW
EGFR: 18 ML/MIN/1.73M2 — LOW
EOSINOPHIL # BLD AUTO: 0.14 K/UL — SIGNIFICANT CHANGE UP (ref 0–0.7)
EOSINOPHIL NFR BLD AUTO: 1.2 % — SIGNIFICANT CHANGE UP (ref 0–8)
GLUCOSE SERPL-MCNC: 124 MG/DL — HIGH (ref 70–99)
HCT VFR BLD CALC: 29.8 % — LOW (ref 37–47)
HGB BLD-MCNC: 9.8 G/DL — LOW (ref 12–16)
IMM GRANULOCYTES NFR BLD AUTO: 0.5 % — HIGH (ref 0.1–0.3)
LYMPHOCYTES # BLD AUTO: 1.24 K/UL — SIGNIFICANT CHANGE UP (ref 1.2–3.4)
LYMPHOCYTES # BLD AUTO: 10.8 % — LOW (ref 20.5–51.1)
MAGNESIUM SERPL-MCNC: 2.2 MG/DL — SIGNIFICANT CHANGE UP (ref 1.8–2.4)
MCHC RBC-ENTMCNC: 30.1 PG — SIGNIFICANT CHANGE UP (ref 27–31)
MCHC RBC-ENTMCNC: 32.9 G/DL — SIGNIFICANT CHANGE UP (ref 32–37)
MCV RBC AUTO: 91.4 FL — SIGNIFICANT CHANGE UP (ref 81–99)
MONOCYTES # BLD AUTO: 0.82 K/UL — HIGH (ref 0.1–0.6)
MONOCYTES NFR BLD AUTO: 7.2 % — SIGNIFICANT CHANGE UP (ref 1.7–9.3)
NEUTROPHILS # BLD AUTO: 9.09 K/UL — HIGH (ref 1.4–6.5)
NEUTROPHILS NFR BLD AUTO: 79.6 % — HIGH (ref 42.2–75.2)
NRBC BLD AUTO-RTO: 0 /100 WBCS — SIGNIFICANT CHANGE UP (ref 0–0)
PLATELET # BLD AUTO: 304 K/UL — SIGNIFICANT CHANGE UP (ref 130–400)
PMV BLD: 10.4 FL — SIGNIFICANT CHANGE UP (ref 7.4–10.4)
POTASSIUM SERPL-MCNC: 4 MMOL/L — SIGNIFICANT CHANGE UP (ref 3.5–5)
POTASSIUM SERPL-SCNC: 4 MMOL/L — SIGNIFICANT CHANGE UP (ref 3.5–5)
PROT SERPL-MCNC: 7.4 G/DL — SIGNIFICANT CHANGE UP (ref 6–8)
RBC # BLD: 3.26 M/UL — LOW (ref 4.2–5.4)
RBC # FLD: 13.1 % — SIGNIFICANT CHANGE UP (ref 11.5–14.5)
SODIUM SERPL-SCNC: 135 MMOL/L — SIGNIFICANT CHANGE UP (ref 135–146)
TROPONIN T, HIGH SENSITIVITY RESULT: 66 NG/L — CRITICAL HIGH (ref 6–13)
TROPONIN T, HIGH SENSITIVITY RESULT: 72 NG/L — CRITICAL HIGH (ref 6–13)
WBC # BLD: 11.43 K/UL — HIGH (ref 4.8–10.8)
WBC # FLD AUTO: 11.43 K/UL — HIGH (ref 4.8–10.8)

## 2025-05-03 PROCEDURE — 70450 CT HEAD/BRAIN W/O DYE: CPT | Mod: 26

## 2025-05-03 PROCEDURE — 71045 X-RAY EXAM CHEST 1 VIEW: CPT | Mod: 26

## 2025-05-03 PROCEDURE — 99285 EMERGENCY DEPT VISIT HI MDM: CPT

## 2025-05-03 PROCEDURE — 93010 ELECTROCARDIOGRAM REPORT: CPT
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
## 2025-05-03 NOTE — ED ADULT TRIAGE NOTE - CHIEF COMPLAINT QUOTE
BIBA from hemodialysis s/p an episode of syncope while waiting in the waiting room for her ride home after completing her hemodialysis treatment

## 2025-05-03 NOTE — ED PROVIDER NOTE - NSICDXPASTMEDICALHX_GEN_ALL_CORE_FT
PAST MEDICAL HISTORY:  DM (diabetes mellitus)     ESRD on hemodialysis     HLD (hyperlipidemia)     HTN (hypertension)     Obese      PAST MEDICAL HISTORY:  CAD (coronary artery disease)     DM (diabetes mellitus)     ESRD on hemodialysis     HLD (hyperlipidemia)     HTN (hypertension)     Obese

## 2025-05-03 NOTE — ED PROVIDER NOTE - DIFFERENTIAL DIAGNOSIS
Differential Diagnosis differential dx includes but is not limited to:  ICH, brain mass, arrhythmia, electrolyte derangement, hypoglycemia, ACS
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
## 2025-05-03 NOTE — ED PROVIDER NOTE - CARE PLAN
Principal Discharge DX:	Syncope   1 Principal Discharge DX:	Syncope  Secondary Diagnosis:	Elevated troponin level  Secondary Diagnosis:	ESRD on hemodialysis  Secondary Diagnosis:	Chronic anemia

## 2025-05-03 NOTE — ED PROVIDER NOTE - WR ORDER STATUS 1
Interim investigations : Imaging studies: ->  * US Abdomen done 07/29/2020, ordered by PCP:  1. Hepatic steatosis 2. Cholethiasis without definite sonographic signs of acute cholecystitis at the time of imaging ;   Interim investigations : Labs done on: ->  * 11/18/2020 ordered by Dr. Whitley:  - Hepatic function panel: protein, total: 7.0; Alb: 4.2; Tbili: 0.5; Direct bili: 0.15; ALT: 41; AST: 37; ALP: 84 - H. pylori Breath test: negative - Amylase: 86; Lipase: 51;   Initial consultation : Patient is here for -> GERD/Epigastric pain Onset of symptoms: Patient was seen by PCP, Dr. Darvin Bhandari in 07/2020 due to abdominal pain  Subsequently US abdomen was ordered, see below Patient was then referred to Dr. Srinivas Whitley for cholecystectomy  Patient then had a lap Cholescytectomy and Appendectomy on 09/10/2020 with pathology showing mild chronic cholecystitis with associated cholelithiasis and associated benign adenomyoma.  Pathology of the appendix showed reactive appendiceal mucosa and submucosa with associated fecalith, negative for acute appendicitis.  1 month after cholecystectomy patient started to have abdominal pain and heartburn symptoms Characteristics: epigastric pain, burning sensation Aggravating factors: wakes patient up in early  Relieving factors: foods/drinks Medications tried: Maalox & Prilosec did not help much; Pepcid helps a bit. Symptoms resolved spontaneously 1 week ago. No dysphagia.  Tests/evaluations done previously:  * Colonscopy done 08/01/2018, performed by Dr. Davion Padilla. At that time five small polyps were detected and resected. Histology showed they were tubular adenoma (3) and hyperplastic (2). There was mild diverticulosis in the sigmoid and descending colon. Grade III non-bleeding internal hemorrhoids were found during retroflextion but not banded. Good quality bowel prep * Last EGD done 2010 which showed evidence of gastritis;   
Performed

## 2025-05-03 NOTE — ED PROVIDER NOTE - CLINICAL SUMMARY MEDICAL DECISION MAKING FREE TEXT BOX
Pt here with possible seizure (given questionable postictal period) vs syncope today after completing HD. Vitals wnl in ED. No focal neuro deficits. Plan for labs, ekg, imaging r/o ICH, brain mass, arrhythmia, electrolyte derangement, hypoglycemia, ACS. Labs notable for chronic anemia similar to baseline, kidney fxn c/w ESRD, trop 72>66. Ekg with NSR, no ischemic changes. CT head negative. Cxr clear. Given elevated trop with unclear syncope vs seizure, pt to be admitted. Pt requires admission for continued cardiorespiratory monitoring given risk for ACS, heart failure, cardiogenic shock, dangerous arrhythmia, etc if not closely monitored and tx'ed.

## 2025-05-03 NOTE — ED PROVIDER NOTE - PHYSICAL EXAMINATION
Physical Exam    Vital Signs: I have reviewed the initial vital signs.  Constitutional: well-nourished, appears stated age, no acute distress  Eyes: Conjunctiva pink, Sclera clear, PERRLA, EOMI without pain. no nystagmus.   Cardiovascular: regular rate, regular rhythm, well-perfused extremities, radial pulses equal and 2+ b/l.   Respiratory: unlabored respiratory effort, clear to auscultation bilaterally no wheezing, rales and rhonchi. pt is speaking full sentences. no accessory muscle use.   Gastrointestinal: soft, non-tender, nondistended abdomen, no pulsatile mass, no rebound, no guarding  Musculoskeletal: supple neck, no lower extremity edema, no calf tenderness, FROM of b/l upper and lower extremities  Integumentary: warm, dry, no rash  Neurologic: awake, alert, cranial nerves II-XII grossly intact, extremities’ motor and sensory functions grossly intact. finger to nose intact. negative pronator drift. 5/5 strength throughout.   Psychiatric: appropriate mood, appropriate affect

## 2025-05-03 NOTE — ED ADULT TRIAGE NOTE - PATIENT ON (OXYGEN DELIVERY METHOD)
Consult the [facility] for specific management of lower extremity edema and reduced ejection fraction room air

## 2025-05-04 DIAGNOSIS — R55 SYNCOPE AND COLLAPSE: ICD-10-CM

## 2025-05-04 LAB
ALBUMIN SERPL ELPH-MCNC: 3.5 G/DL — SIGNIFICANT CHANGE UP (ref 3.5–5.2)
ALP SERPL-CCNC: 65 U/L — SIGNIFICANT CHANGE UP (ref 30–115)
ALT FLD-CCNC: <5 U/L — SIGNIFICANT CHANGE UP (ref 0–41)
ANION GAP SERPL CALC-SCNC: 14 MMOL/L — SIGNIFICANT CHANGE UP (ref 7–14)
AST SERPL-CCNC: 12 U/L — SIGNIFICANT CHANGE UP (ref 0–41)
BASOPHILS # BLD AUTO: 0.04 K/UL — SIGNIFICANT CHANGE UP (ref 0–0.2)
BASOPHILS NFR BLD AUTO: 0.4 % — SIGNIFICANT CHANGE UP (ref 0–1)
BILIRUB SERPL-MCNC: 0.3 MG/DL — SIGNIFICANT CHANGE UP (ref 0.2–1.2)
BUN SERPL-MCNC: 18 MG/DL — SIGNIFICANT CHANGE UP (ref 10–20)
CALCIUM SERPL-MCNC: 8.7 MG/DL — SIGNIFICANT CHANGE UP (ref 8.4–10.5)
CHLORIDE SERPL-SCNC: 99 MMOL/L — SIGNIFICANT CHANGE UP (ref 98–110)
CHOLEST SERPL-MCNC: 173 MG/DL — SIGNIFICANT CHANGE UP
CO2 SERPL-SCNC: 29 MMOL/L — SIGNIFICANT CHANGE UP (ref 17–32)
CREAT SERPL-MCNC: 3.5 MG/DL — HIGH (ref 0.7–1.5)
EGFR: 14 ML/MIN/1.73M2 — LOW
EGFR: 14 ML/MIN/1.73M2 — LOW
EOSINOPHIL # BLD AUTO: 0.11 K/UL — SIGNIFICANT CHANGE UP (ref 0–0.7)
EOSINOPHIL NFR BLD AUTO: 1.1 % — SIGNIFICANT CHANGE UP (ref 0–8)
GLUCOSE BLDC GLUCOMTR-MCNC: 118 MG/DL — HIGH (ref 70–99)
GLUCOSE BLDC GLUCOMTR-MCNC: 121 MG/DL — HIGH (ref 70–99)
GLUCOSE BLDC GLUCOMTR-MCNC: 122 MG/DL — HIGH (ref 70–99)
GLUCOSE BLDC GLUCOMTR-MCNC: 130 MG/DL — HIGH (ref 70–99)
GLUCOSE SERPL-MCNC: 105 MG/DL — HIGH (ref 70–99)
HCT VFR BLD CALC: 27.2 % — LOW (ref 37–47)
HDLC SERPL-MCNC: 40 MG/DL — LOW
HGB BLD-MCNC: 8.9 G/DL — LOW (ref 12–16)
IMM GRANULOCYTES NFR BLD AUTO: 0.4 % — HIGH (ref 0.1–0.3)
LDLC SERPL-MCNC: 106 MG/DL — HIGH
LIPID PNL WITH DIRECT LDL SERPL: 106 MG/DL — HIGH
LYMPHOCYTES # BLD AUTO: 1.45 K/UL — SIGNIFICANT CHANGE UP (ref 1.2–3.4)
LYMPHOCYTES # BLD AUTO: 13.9 % — LOW (ref 20.5–51.1)
MCHC RBC-ENTMCNC: 30.7 PG — SIGNIFICANT CHANGE UP (ref 27–31)
MCHC RBC-ENTMCNC: 32.7 G/DL — SIGNIFICANT CHANGE UP (ref 32–37)
MCV RBC AUTO: 93.8 FL — SIGNIFICANT CHANGE UP (ref 81–99)
MONOCYTES # BLD AUTO: 0.84 K/UL — HIGH (ref 0.1–0.6)
MONOCYTES NFR BLD AUTO: 8 % — SIGNIFICANT CHANGE UP (ref 1.7–9.3)
NEUTROPHILS # BLD AUTO: 7.96 K/UL — HIGH (ref 1.4–6.5)
NEUTROPHILS NFR BLD AUTO: 76.2 % — HIGH (ref 42.2–75.2)
NONHDLC SERPL-MCNC: 133 MG/DL — HIGH
NRBC BLD AUTO-RTO: 0 /100 WBCS — SIGNIFICANT CHANGE UP (ref 0–0)
PLATELET # BLD AUTO: 265 K/UL — SIGNIFICANT CHANGE UP (ref 130–400)
PMV BLD: 10.6 FL — HIGH (ref 7.4–10.4)
POTASSIUM SERPL-MCNC: 4.1 MMOL/L — SIGNIFICANT CHANGE UP (ref 3.5–5)
POTASSIUM SERPL-SCNC: 4.1 MMOL/L — SIGNIFICANT CHANGE UP (ref 3.5–5)
PROT SERPL-MCNC: 6.2 G/DL — SIGNIFICANT CHANGE UP (ref 6–8)
RBC # BLD: 2.9 M/UL — LOW (ref 4.2–5.4)
RBC # FLD: 13.2 % — SIGNIFICANT CHANGE UP (ref 11.5–14.5)
SODIUM SERPL-SCNC: 142 MMOL/L — SIGNIFICANT CHANGE UP (ref 135–146)
TRIGL SERPL-MCNC: 150 MG/DL — HIGH
WBC # BLD: 10.44 K/UL — SIGNIFICANT CHANGE UP (ref 4.8–10.8)
WBC # FLD AUTO: 10.44 K/UL — SIGNIFICANT CHANGE UP (ref 4.8–10.8)

## 2025-05-04 PROCEDURE — 83036 HEMOGLOBIN GLYCOSYLATED A1C: CPT

## 2025-05-04 PROCEDURE — 80061 LIPID PANEL: CPT

## 2025-05-04 PROCEDURE — 99222 1ST HOSP IP/OBS MODERATE 55: CPT

## 2025-05-04 PROCEDURE — 97162 PT EVAL MOD COMPLEX 30 MIN: CPT | Mod: GP

## 2025-05-04 PROCEDURE — 90935 HEMODIALYSIS ONE EVALUATION: CPT

## 2025-05-04 PROCEDURE — 95819 EEG AWAKE AND ASLEEP: CPT | Mod: 26

## 2025-05-04 PROCEDURE — 87640 STAPH A DNA AMP PROBE: CPT

## 2025-05-04 PROCEDURE — 83735 ASSAY OF MAGNESIUM: CPT

## 2025-05-04 PROCEDURE — 95816 EEG AWAKE AND DROWSY: CPT

## 2025-05-04 PROCEDURE — 82962 GLUCOSE BLOOD TEST: CPT

## 2025-05-04 PROCEDURE — 85025 COMPLETE CBC W/AUTO DIFF WBC: CPT

## 2025-05-04 PROCEDURE — 80053 COMPREHEN METABOLIC PANEL: CPT

## 2025-05-04 PROCEDURE — 93307 TTE W/O DOPPLER COMPLETE: CPT

## 2025-05-04 PROCEDURE — 93306 TTE W/DOPPLER COMPLETE: CPT | Mod: 26

## 2025-05-04 PROCEDURE — 36415 COLL VENOUS BLD VENIPUNCTURE: CPT

## 2025-05-04 PROCEDURE — 99223 1ST HOSP IP/OBS HIGH 75: CPT

## 2025-05-04 PROCEDURE — 87641 MR-STAPH DNA AMP PROBE: CPT

## 2025-05-04 RX ORDER — INSULIN GLARGINE-YFGN 100 [IU]/ML
24 INJECTION, SOLUTION SUBCUTANEOUS
Refills: 0 | DISCHARGE

## 2025-05-04 RX ORDER — INSULIN LISPRO 100 U/ML
8 INJECTION, SOLUTION INTRAVENOUS; SUBCUTANEOUS
Refills: 0 | DISCHARGE

## 2025-05-04 RX ORDER — DEXTROSE 50 % IN WATER 50 %
12.5 SYRINGE (ML) INTRAVENOUS ONCE
Refills: 0 | Status: DISCONTINUED | OUTPATIENT
Start: 2025-05-04 | End: 2025-05-06

## 2025-05-04 RX ORDER — DEXTROSE 50 % IN WATER 50 %
25 SYRINGE (ML) INTRAVENOUS ONCE
Refills: 0 | Status: DISCONTINUED | OUTPATIENT
Start: 2025-05-04 | End: 2025-05-06

## 2025-05-04 RX ORDER — BUMETANIDE 1 MG/1
1 TABLET ORAL
Refills: 0 | DISCHARGE

## 2025-05-04 RX ORDER — INSULIN LISPRO 100 U/ML
6 INJECTION, SOLUTION INTRAVENOUS; SUBCUTANEOUS
Refills: 0 | Status: DISCONTINUED | OUTPATIENT
Start: 2025-05-04 | End: 2025-05-06

## 2025-05-04 RX ORDER — SODIUM BICARBONATE 1 MEQ/ML
650 SYRINGE (ML) INTRAVENOUS THREE TIMES A DAY
Refills: 0 | Status: DISCONTINUED | OUTPATIENT
Start: 2025-05-04 | End: 2025-05-06

## 2025-05-04 RX ORDER — MAGNESIUM, ALUMINUM HYDROXIDE 200-200 MG
30 TABLET,CHEWABLE ORAL EVERY 4 HOURS
Refills: 0 | Status: DISCONTINUED | OUTPATIENT
Start: 2025-05-04 | End: 2025-05-06

## 2025-05-04 RX ORDER — ASPIRIN 325 MG
81 TABLET ORAL DAILY
Refills: 0 | Status: DISCONTINUED | OUTPATIENT
Start: 2025-05-04 | End: 2025-05-06

## 2025-05-04 RX ORDER — DEXTROSE 50 % IN WATER 50 %
15 SYRINGE (ML) INTRAVENOUS ONCE
Refills: 0 | Status: DISCONTINUED | OUTPATIENT
Start: 2025-05-04 | End: 2025-05-06

## 2025-05-04 RX ORDER — INSULIN GLARGINE-YFGN 100 [IU]/ML
24 INJECTION, SOLUTION SUBCUTANEOUS AT BEDTIME
Refills: 0 | Status: DISCONTINUED | OUTPATIENT
Start: 2025-05-04 | End: 2025-05-06

## 2025-05-04 RX ORDER — INSULIN LISPRO 100 U/ML
INJECTION, SOLUTION INTRAVENOUS; SUBCUTANEOUS
Refills: 0 | Status: DISCONTINUED | OUTPATIENT
Start: 2025-05-04 | End: 2025-05-06

## 2025-05-04 RX ORDER — GLUCAGON 3 MG/1
1 POWDER NASAL ONCE
Refills: 0 | Status: DISCONTINUED | OUTPATIENT
Start: 2025-05-04 | End: 2025-05-06

## 2025-05-04 RX ORDER — CARVEDILOL 3.12 MG/1
1 TABLET, FILM COATED ORAL
Refills: 0 | DISCHARGE

## 2025-05-04 RX ORDER — NIFEDIPINE 30 MG
30 TABLET, EXTENDED RELEASE 24 HR ORAL DAILY
Refills: 0 | Status: DISCONTINUED | OUTPATIENT
Start: 2025-05-05 | End: 2025-05-06

## 2025-05-04 RX ORDER — SODIUM CHLORIDE 9 G/1000ML
1000 INJECTION, SOLUTION INTRAVENOUS
Refills: 0 | Status: DISCONTINUED | OUTPATIENT
Start: 2025-05-04 | End: 2025-05-06

## 2025-05-04 RX ORDER — CALCITRIOL 0.5 UG/1
0.25 CAPSULE, GELATIN COATED ORAL DAILY
Refills: 0 | Status: DISCONTINUED | OUTPATIENT
Start: 2025-05-04 | End: 2025-05-06

## 2025-05-04 RX ORDER — HEPARIN SODIUM 1000 [USP'U]/ML
5000 INJECTION INTRAVENOUS; SUBCUTANEOUS EVERY 8 HOURS
Refills: 0 | Status: DISCONTINUED | OUTPATIENT
Start: 2025-05-04 | End: 2025-05-06

## 2025-05-04 RX ORDER — SODIUM BICARBONATE 1 MEQ/ML
650 SYRINGE (ML) INTRAVENOUS
Refills: 0 | DISCHARGE

## 2025-05-04 RX ORDER — ATORVASTATIN CALCIUM 80 MG/1
1 TABLET, FILM COATED ORAL
Refills: 0 | DISCHARGE

## 2025-05-04 RX ORDER — INSULIN LISPRO 100 U/ML
INJECTION, SOLUTION INTRAVENOUS; SUBCUTANEOUS AT BEDTIME
Refills: 0 | Status: DISCONTINUED | OUTPATIENT
Start: 2025-05-04 | End: 2025-05-06

## 2025-05-04 RX ORDER — ASPIRIN 325 MG
0 TABLET ORAL
Refills: 0 | DISCHARGE

## 2025-05-04 RX ORDER — NIFEDIPINE 30 MG
1 TABLET, EXTENDED RELEASE 24 HR ORAL
Refills: 0 | DISCHARGE

## 2025-05-04 RX ORDER — CARVEDILOL 3.12 MG/1
6.25 TABLET, FILM COATED ORAL EVERY 12 HOURS
Refills: 0 | Status: DISCONTINUED | OUTPATIENT
Start: 2025-05-04 | End: 2025-05-05

## 2025-05-04 RX ORDER — ATORVASTATIN CALCIUM 80 MG/1
40 TABLET, FILM COATED ORAL AT BEDTIME
Refills: 0 | Status: DISCONTINUED | OUTPATIENT
Start: 2025-05-04 | End: 2025-05-06

## 2025-05-04 RX ORDER — NIFEDIPINE 30 MG
90 TABLET, EXTENDED RELEASE 24 HR ORAL DAILY
Refills: 0 | Status: DISCONTINUED | OUTPATIENT
Start: 2025-05-04 | End: 2025-05-04

## 2025-05-04 RX ORDER — ONDANSETRON HCL/PF 4 MG/2 ML
4 VIAL (ML) INJECTION EVERY 8 HOURS
Refills: 0 | Status: DISCONTINUED | OUTPATIENT
Start: 2025-05-04 | End: 2025-05-06

## 2025-05-04 RX ORDER — BUMETANIDE 1 MG/1
1 TABLET ORAL
Refills: 0 | Status: DISCONTINUED | OUTPATIENT
Start: 2025-05-04 | End: 2025-05-06

## 2025-05-04 RX ORDER — ACETAMINOPHEN 500 MG/5ML
650 LIQUID (ML) ORAL EVERY 6 HOURS
Refills: 0 | Status: DISCONTINUED | OUTPATIENT
Start: 2025-05-04 | End: 2025-05-06

## 2025-05-04 RX ORDER — CALCITRIOL 0.5 UG/1
1 CAPSULE, GELATIN COATED ORAL
Refills: 0 | DISCHARGE

## 2025-05-04 RX ORDER — MELATONIN 5 MG
3 TABLET ORAL AT BEDTIME
Refills: 0 | Status: DISCONTINUED | OUTPATIENT
Start: 2025-05-04 | End: 2025-05-06

## 2025-05-04 RX ADMIN — INSULIN GLARGINE-YFGN 24 UNIT(S): 100 INJECTION, SOLUTION SUBCUTANEOUS at 22:20

## 2025-05-04 RX ADMIN — BUMETANIDE 1 MILLIGRAM(S): 1 TABLET ORAL at 05:44

## 2025-05-04 RX ADMIN — HEPARIN SODIUM 5000 UNIT(S): 1000 INJECTION INTRAVENOUS; SUBCUTANEOUS at 14:39

## 2025-05-04 RX ADMIN — CALCITRIOL 0.25 MICROGRAM(S): 0.5 CAPSULE, GELATIN COATED ORAL at 14:40

## 2025-05-04 RX ADMIN — INSULIN LISPRO 6 UNIT(S): 100 INJECTION, SOLUTION INTRAVENOUS; SUBCUTANEOUS at 17:20

## 2025-05-04 RX ADMIN — HEPARIN SODIUM 5000 UNIT(S): 1000 INJECTION INTRAVENOUS; SUBCUTANEOUS at 05:42

## 2025-05-04 RX ADMIN — ATORVASTATIN CALCIUM 40 MILLIGRAM(S): 80 TABLET, FILM COATED ORAL at 22:18

## 2025-05-04 RX ADMIN — Medication 81 MILLIGRAM(S): at 14:39

## 2025-05-04 RX ADMIN — Medication 650 MILLIGRAM(S): at 14:40

## 2025-05-04 RX ADMIN — Medication 650 MILLIGRAM(S): at 05:43

## 2025-05-04 RX ADMIN — BUMETANIDE 1 MILLIGRAM(S): 1 TABLET ORAL at 14:39

## 2025-05-04 RX ADMIN — INSULIN LISPRO 6 UNIT(S): 100 INJECTION, SOLUTION INTRAVENOUS; SUBCUTANEOUS at 11:54

## 2025-05-04 RX ADMIN — HEPARIN SODIUM 5000 UNIT(S): 1000 INJECTION INTRAVENOUS; SUBCUTANEOUS at 22:18

## 2025-05-04 RX ADMIN — Medication 90 MILLIGRAM(S): at 05:44

## 2025-05-04 RX ADMIN — Medication 40 MILLIGRAM(S): at 05:44

## 2025-05-04 RX ADMIN — CARVEDILOL 6.25 MILLIGRAM(S): 3.12 TABLET, FILM COATED ORAL at 05:43

## 2025-05-04 RX ADMIN — CARVEDILOL 6.25 MILLIGRAM(S): 3.12 TABLET, FILM COATED ORAL at 17:21

## 2025-05-04 RX ADMIN — INSULIN LISPRO 6 UNIT(S): 100 INJECTION, SOLUTION INTRAVENOUS; SUBCUTANEOUS at 09:23

## 2025-05-04 RX ADMIN — Medication 650 MILLIGRAM(S): at 22:17

## 2025-05-04 NOTE — ED ADULT NURSE NOTE - NSICDXPASTMEDICALHX_GEN_ALL_CORE_FT
PAST MEDICAL HISTORY:  CAD (coronary artery disease)     DM (diabetes mellitus)     ESRD on hemodialysis     HLD (hyperlipidemia)     HTN (hypertension)     Obese

## 2025-05-04 NOTE — H&P ADULT - NSHPPHYSICALEXAM_GEN_ALL_CORE
GENERAL: NAD, lying in bed comfortably  HEAD:  Atraumatic, normocephalic  EYES: EOMI, PERRLA, conjunctiva and sclera clear  NECK: Supple, trachea midline, no JVD  HEART: Regular rate and rhythm, no murmurs, rubs, or gallops  LUNGS: Unlabored respirations.  Clear to auscultation bilaterally, no crackles, wheezing, or rhonchi  ABDOMEN: Soft, nontender, nondistended, +BS  EXTREMITIES: 2+ peripheral pulses bilaterally. No clubbing, cyanosis, or edema  NERVOUS SYSTEM:  A&Ox3, moving all extremities, no focal deficits
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
## 2025-05-04 NOTE — ED ADULT NURSE NOTE - CAS EDN DISCHARGE INTERVENTIONS
Final Anesthesia Post-op Assessment    Patient: Whitney Patton  Procedure(s) Performed: BUNIONECTOMY WITH FIRST METATARSAL MEDIAL CUNEIFORM ARTHRODESIS AND POSSIBLE PROXIMAL PHALANX OSTEOTOMY, RIGHT FOOT  Anesthesia type: MAC    Vitals Value Taken Time   Temp 36.6 °C (97.9 °F) 12/01/22 1607   Pulse 63 12/01/22 1615   Resp 20 12/01/22 1615   SpO2 100 % 12/01/22 1615   /72 12/01/22 1615         Patient Location: PACU Phase 1  Post-op Vital Signs:stable  Level of Consciousness: awake and alert  Respiratory Status: spontaneous ventilation  Cardiovascular stable  Hydration: euvolemic  Pain Management: adequately controlled  Handoff: Handoff to receiving clinician was performed and questions were answered  Vomiting: none  Nausea: None  Airway Patency:patent  Post-op Assessment: no complications and patient tolerated procedure well with no complications      There were no known complications for this encounter.    IV intact
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
## 2025-05-04 NOTE — EEG REPORT - NS EEG TEXT BOX
[facility]  Inpatient Routine-EEG Report      Patient Name:	JORGE GARVIN  :	1955  MRN:	-  Study Date/Time:	2025, 4:34:56 AM  Referred by:	-    Brief Clinical History:  JORGE GARVIN is a 69 year old Female; study performed to investigate for seizures or markers of epilepsy.   Diagnosis Code: R40.4 Transient alteration of awareness    Patient Medication:  -    MELATONIN    ZOFRAN    BUMEX    COREG    ADMELOG    PROTONIX    PROCARDIA XL      Acquisition Details:  Electroencephalography was acquired using a minimum of 21 channels on an [employer] Neu[employer] system v 9.3.1 with electrode placement according to the standard International 10-20 system following ACNS (American Clinical Neurophysiology Society) guidelines.  Anterior temporal T1 and T2 electrodes were utilized whenever possible.   The [employer] automated spike & seizure detections were all reviewed in detail, in addition to the entire raw EEG.    Findings:  Background:  continuous.   Voltage:  Normal (20uV)  Organization:  Appropriate anterior-posterior gradient  Posterior Dominant Rhythm:  10 Hz symmetric, well-organized, and well-modulated  Variability:  Yes	                 Reactivity:  Yes  Sleep:  Symmetric, synchronous spindles and K complexes.  Focal abnormalities:  No persistent asymmetries of voltage or frequency.  Interictal Activity:  None  Focal Slowing:  None  Generalized Slowing:  No  Events:  1)	No electrographic seizures or significant clinical events.  Provocations:  1)	Hyperventilation: was not performed.  2)	Photic stimulation: was not performed.  Impression:  Normal REEG    Clinical Correlation:  Normal study does not exclude diagnosis of seizure disorder    Wil Elizabeth MD  Attending Neurologist, Division of Epilepsy

## 2025-05-04 NOTE — H&P ADULT - ASSESSMENT
- Pt here with possible seizure (given questionable postictal period) vs syncope today after completing HD. Vitals wnl in ED. -   - monitor on tele   - ECHO   -   - EEG   68 yo F with hx of HTN, HLD, DM, ESRD on HD (Tues/Thurs/Sat), CAD s/p PCI x1 who presents after suspected seizure vs syncopal event that occurred after HD session today. CT Head No Cont: No acute process seen. Vitals stable on presentation.     Labs: Hb 9.8, WBC 11.43, trops 72 -> 66, BUN/Cr 15/2.8.     Admitted for a possible syncope/seizure.     #Suspected seizure vs syncope   - Pt here with possible seizure (given questionable postictal period) vs syncope after completing HD.  - monitor on tele   - ECHO   - rEEG  - EKG no events no ischemic changes   - Monitor electrolytes  - Trops trended down   - PT consult     #CAD s/p PCI  - c/w asa and statin     #ESRD on HD   - TTS schedule   - F/u nephro   - C/w bumex     #HLD  #HTN  - C/W home meds    #DM  - FS and insulin protocol     #MISC  - Diet - renal   - DVT prophy - heparin  - activity - IAT  - GI prophy - PPi

## 2025-05-04 NOTE — CONSULT NOTE ADULT - ASSESSMENT
Patient is 70 yo F with hx of HTN, HLD, DM, ESRD on HD (Tues/Thurs/Sat), CAD s/p PCI x1 who presents after possible syncopal event that occurred prior to arrival.     # ESRD oN HD T TH Sat  # syncope? vasovagal / related to UF on HD? arrythmia   # anemia chronic     - no need for RRT today   - EEG noted wnl   - CT head neg   - check orthostatics   - check IP and Fe studies / PTH on Vit D   - LENORA on HD     renal team will follow

## 2025-05-04 NOTE — CHART NOTE - NSCHARTNOTEFT_GEN_A_CORE
pt seen and examined    orthostatic BP neg  tele monitor neg so far  TTE wnl, REEG neg    syncope after HD  on procardia 90, coreg 6 bid and bumex  dec procardia to 30mg qd  extra night tele monitor, if neg can dc in AM

## 2025-05-04 NOTE — PATIENT PROFILE ADULT - HAVE YOU BEEN EATING POORLY BECAUSE OF A DECREASED APPETITE?
CM following for home care services.  Call out to [facility] again re home care orders, d/w [facility] @ [phone].  Per [facility] attempted [facility] contact however informed pt was hospitalized. Services ordered were SN, PT and MSW. CM to monitor for dc hc needs, SC to MD camilo this info as orders would need to be placed as pt is IP.  Fax for HC orders and associated info to [phone]. CM to follow.     No (0)

## 2025-05-05 ENCOUNTER — TRANSCRIPTION ENCOUNTER (OUTPATIENT)
Age: 70
End: 2025-05-05

## 2025-05-05 LAB
A1C WITH ESTIMATED AVERAGE GLUCOSE RESULT: 5.2 % — SIGNIFICANT CHANGE UP (ref 4–5.6)
ESTIMATED AVERAGE GLUCOSE: 103 MG/DL — SIGNIFICANT CHANGE UP (ref 68–114)
GLUCOSE BLDC GLUCOMTR-MCNC: 114 MG/DL — HIGH (ref 70–99)
MRSA PCR RESULT.: NEGATIVE — SIGNIFICANT CHANGE UP

## 2025-05-05 PROCEDURE — 99232 SBSQ HOSP IP/OBS MODERATE 35: CPT

## 2025-05-05 RX ADMIN — BUMETANIDE 1 MILLIGRAM(S): 1 TABLET ORAL at 05:45

## 2025-05-05 RX ADMIN — Medication 650 MILLIGRAM(S): at 21:35

## 2025-05-05 RX ADMIN — HEPARIN SODIUM 5000 UNIT(S): 1000 INJECTION INTRAVENOUS; SUBCUTANEOUS at 14:26

## 2025-05-05 RX ADMIN — CALCITRIOL 0.25 MICROGRAM(S): 0.5 CAPSULE, GELATIN COATED ORAL at 14:25

## 2025-05-05 RX ADMIN — INSULIN LISPRO 6 UNIT(S): 100 INJECTION, SOLUTION INTRAVENOUS; SUBCUTANEOUS at 12:38

## 2025-05-05 RX ADMIN — INSULIN LISPRO 6 UNIT(S): 100 INJECTION, SOLUTION INTRAVENOUS; SUBCUTANEOUS at 08:38

## 2025-05-05 RX ADMIN — Medication 650 MILLIGRAM(S): at 17:12

## 2025-05-05 RX ADMIN — BUMETANIDE 1 MILLIGRAM(S): 1 TABLET ORAL at 14:25

## 2025-05-05 RX ADMIN — Medication 650 MILLIGRAM(S): at 05:45

## 2025-05-05 RX ADMIN — Medication 30 MILLIGRAM(S): at 05:45

## 2025-05-05 RX ADMIN — HEPARIN SODIUM 5000 UNIT(S): 1000 INJECTION INTRAVENOUS; SUBCUTANEOUS at 21:35

## 2025-05-05 RX ADMIN — ATORVASTATIN CALCIUM 40 MILLIGRAM(S): 80 TABLET, FILM COATED ORAL at 21:35

## 2025-05-05 RX ADMIN — CARVEDILOL 6.25 MILLIGRAM(S): 3.12 TABLET, FILM COATED ORAL at 05:45

## 2025-05-05 RX ADMIN — INSULIN LISPRO 6 UNIT(S): 100 INJECTION, SOLUTION INTRAVENOUS; SUBCUTANEOUS at 17:13

## 2025-05-05 RX ADMIN — INSULIN GLARGINE-YFGN 24 UNIT(S): 100 INJECTION, SOLUTION SUBCUTANEOUS at 21:35

## 2025-05-05 RX ADMIN — Medication 81 MILLIGRAM(S): at 14:25

## 2025-05-05 RX ADMIN — Medication 40 MILLIGRAM(S): at 05:47

## 2025-05-05 RX ADMIN — HEPARIN SODIUM 5000 UNIT(S): 1000 INJECTION INTRAVENOUS; SUBCUTANEOUS at 05:44

## 2025-05-05 NOTE — DISCHARGE NOTE PROVIDER - ATTENDING DISCHARGE PHYSICAL EXAMINATION:
Vital Signs Last 24 Hrs  T(F): 97.9 (06 May 2025 05:11), Max: 98 (05 May 2025 20:56)  HR: 69 (06 May 2025 11:45) (67 - 78)  BP: 183/66 (06 May 2025 11:45) (140/72 - 191/76)  RR: 18 (06 May 2025 11:45) (17 - 18)  SpO2: 99% (06 May 2025 05:11) (99% - 99%)    PHYSICAL EXAM:  GENERAL: NAD, well-groomed, well-developed  HEAD:  Atraumatic, Normocephalic  EYES: EOMI, conjunctiva and sclera clear  ENMT: Moist mucous membranes, Good dentition, no thrush  NECK: Supple, No JVD  CHEST/LUNG: Clear to auscultation bilaterally, good air entry, non-labored breathing  HEART: RRR; S1/S2, 2/6 murmur   ABDOMEN: Soft, Nontender, Nondistended; Bowel sounds present  VASCULAR: Normal pulses, Normal capillary refill  EXTREMITIES: No calf tenderness, No cyanosis, No edema  LYMPH: Normal; No lymphadenopathy noted  SKIN: Warm, Intact  PSYCH: Normal mood, Normal affect  NERVOUS SYSTEM:  A/O x3, Good concentration; CN 2-12 intact, No focal deficits

## 2025-05-05 NOTE — DISCHARGE NOTE NURSING/CASE MANAGEMENT/SOCIAL WORK - FINANCIAL ASSISTANCE
[facility] provides services at a reduced cost to those who are determined to be eligible through [facility]’s financial assistance program. Information regarding [facility]’s financial assistance program can be found by going to [email or web address] or by calling [phone].

## 2025-05-05 NOTE — DISCHARGE NOTE PROVIDER - NSDCMRMEDTOKEN_GEN_ALL_CORE_FT
aspirin 81 mg oral tablet: orally once a day  Bumex 1 mg oral tablet: 1 tab(s) orally 2 times a day  calcitriol 0.25 mcg oral capsule: 1 cap(s) orally once a day  carvedilol 6.25 mg oral tablet: 1 tab(s) orally 2 times a day  HumaLOG KwikPen 100 units/mL injectable solution: 8 unit(s) injectable 3 times a day  Insulin Glargine: 24 international unit(s) subcutaneous once a day (at bedtime)  insulin glargine 100 units/mL subcutaneous solution: 24 unit(s) subcutaneous once a day (at bedtime)  Insulin Lispro: 8 international unit(s) subcutaneous 3 times a day (with meals)  Lipitor 40 mg oral tablet: 1 tab(s) orally once a day (at bedtime)  NIFEdipine 90 mg oral tablet, extended release: 1 tab(s) orally once a day  sodium bicarbonate: 650 milligram(s) orally 3 times a day

## 2025-05-05 NOTE — DISCHARGE NOTE PROVIDER - NSDCCPCAREPLAN_GEN_ALL_CORE_FT
PRINCIPAL DISCHARGE DIAGNOSIS  Diagnosis: Syncope  Assessment and Plan of Treatment: You were admitted syncopal episode. You had a video EEG which was negative for seizure activities. You had an echocardiogram and your ejection fraction was within normal limits. The syncopal episode you experience ws most likely due to you recently being dialyzed.The physical therapy saw you and recommended sub acute rehab. Please take your medications as instructed and follow up with your PCP and nephrologist.  Syncope  Syncope is when you temporarily lose consciousness, also called fainting or passing out. It is caused by a sudden decrease in blood flow to the brain. Even though most causes of syncope are not dangerous, syncope can possibly be a sign of a serious medical problem. Signs that you may be about to faint include feeling dizzy, lightheaded, nausea, visual changes, or cold/clammy skin. Do not drive, operate heavy machinery, or play sports until your health care provider says it is okay.  SEEK IMMEDIATE MEDICAL CARE IF YOU HAVE ANY OF THE FOLLOWING SYMPTOMS: severe headache, pain in your chest/abdomen/back, bleeding from your mouth or rectum, palpitations, shortness of breath, pain with breathing, seizure, confusion, or trouble walking.        SECONDARY DISCHARGE DIAGNOSES  Diagnosis: Elevated troponin level  Assessment and Plan of Treatment:     Diagnosis: ESRD on hemodialysis  Assessment and Plan of Treatment:     Diagnosis: Chronic anemia  Assessment and Plan of Treatment:

## 2025-05-05 NOTE — DISCHARGE NOTE PROVIDER - CARE PROVIDERS DIRECT ADDRESSES
,DirectAddress_Unknown ,DirectAddress_Unknown,[email or web address]

## 2025-05-05 NOTE — DISCHARGE NOTE NURSING/CASE MANAGEMENT/SOCIAL WORK - PATIENT PORTAL LINK FT
You can access the FollowMyHealth Patient Portal offered by [facility] [place] by registering at the following website: [email or web address]. By joining [facility]’s FollowMyHealth portal, you will also be able to view your health information using other applications (apps) compatible with our system.

## 2025-05-05 NOTE — DISCHARGE NOTE PROVIDER - CARE PROVIDER_API CALL
Lars dan  Phone: (   )    -  Fax: (   )    -  Follow Up Time:    Lars dan  Phone: (   )    -  Fax: (   )    -  Follow Up Time:     Basil Mahoney  Nephrology  [address] [place] [id number]  Phone: [phone]  Fax: [phone]  Follow Up Time: 2 weeks

## 2025-05-05 NOTE — DISCHARGE NOTE PROVIDER - PROVIDER TOKENS
FREE:[LAST:[ni],FIRST:[Lars],PHONE:[(   )    -],FAX:[(   )    -]] FREE:[LAST:[ni],FIRST:[Lars],PHONE:[(   )    -],FAX:[(   )    -]],PROVIDER:[TOKEN:[[id number]],FOLLOWUP:[2 weeks]]

## 2025-05-05 NOTE — PHYSICAL THERAPY INITIAL EVALUATION ADULT - PERTINENT HX OF CURRENT PROBLEM, REHAB EVAL
pt adm for syncope s/p dialysis, per chart ortho BPs, tele monitor and REEG negative upon admission, TTE WNL

## 2025-05-05 NOTE — PHYSICAL THERAPY INITIAL EVALUATION ADULT - ASSISTIVE DEVICE FOR STAIR TRANSFER, REHAB EVAL
after stair climbing, pt c/o feeling dizzy, chair brought to pt in the stairwell, BP was 89/61, HR 61, pt stated dizziness did not fully subside with sitting, pt brought back to her room via the Estrellita Steady with 2nd PT for assistance, pt returned to chair and BP was 127/69, HR 69, DANIEL Warren and Dr Dasilva informed, RN stated pt's HR went up to 115 bpm/left rail up/left rail down
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
## 2025-05-05 NOTE — PROGRESS NOTE ADULT - SUBJECTIVE AND OBJECTIVE BOX
Nephrology progress note    Patient is seen and examined, events over the last 24 h noted .  Lying in bed comfortable     Allergies:  No Known Allergies    Hospital Medications:   MEDICATIONS  (STANDING):  aspirin enteric coated 81 milliGRAM(s) Oral daily  atorvastatin 40 milliGRAM(s) Oral at bedtime  buMETAnide 1 milliGRAM(s) Oral two times a day  calcitriol   Capsule 0.25 MICROGram(s) Oral daily  glucagon  Injectable 1 milliGRAM(s) IntraMuscular once  heparin   Injectable 5000 Unit(s) SubCutaneous every 8 hours  insulin glargine Injectable (LANTUS) 24 Unit(s) SubCutaneous at bedtime  insulin lispro (ADMELOG) corrective regimen sliding scale   SubCutaneous three times a day before meals  insulin lispro (ADMELOG) corrective regimen sliding scale   SubCutaneous at bedtime  insulin lispro Injectable (ADMELOG) 6 Unit(s) SubCutaneous three times a day before meals  NIFEdipine XL 30 milliGRAM(s) Oral daily  pantoprazole    Tablet 40 milliGRAM(s) Oral before breakfast  sodium bicarbonate 650 milliGRAM(s) Oral three times a day        VITALS:  T(F): 98.4 (05-05-25 @ 12:42), Max: 98.4 (05-05-25 @ 12:42)  HR: 74 (05-05-25 @ 12:42)  BP: 136/75 (05-05-25 @ 12:42)  RR: 16 (05-05-25 @ 12:42)  SpO2: 99% (05-05-25 @ 12:42)    05-03 @ 07:01  -  05-04 @ 07:00  --------------------------------------------------------  IN: 0 mL / OUT: 200 mL / NET: -200 mL    05-04 @ 07:01  -  05-05 @ 07:00  --------------------------------------------------------  IN: 561 mL / OUT: 650 mL / NET: -89 mL    05-05 @ 07:01  -  05-05 @ 13:16  --------------------------------------------------------  IN: 325 mL / OUT: 700 mL / NET: -375 mL          PHYSICAL EXAM:  Constitutional: NAD  Respiratory: CTAB,  Cardiovascular: S1, S2, RRR  Gastrointestinal: BS+, soft, NT/ND  Extremities: No cyanosis or clubbing. No peripheral edema  :  No infante.   Skin: No rashes    LABS:  05-04    142  |  99  |  18  ----------------------------<  105[H]  4.1   |  29  |  3.5[H]    Ca    8.7      04 May 2025 05:32  Mg     2.2     05-03    TPro  6.2  /  Alb  3.5  /  TBili  0.3  /  DBili      /  AST  12  /  ALT  <5  /  AlkPhos  65  05-04                          8.9    10.44 )-----------( 265      ( 04 May 2025 05:32 )             27.2       Urine Studies:  Urinalysis Basic - ( 04 May 2025 05:32 )    Color:  / Appearance:  / SG:  / pH:   Gluc: 105 mg/dL / Ketone:   / Bili:  / Urobili:    Blood:  / Protein:  / Nitrite:    Leuk Esterase:  / RBC:  / WBC    Sq Epi:  / Non Sq Epi:  / Bacteria:           PTH -- (Ca 6.6)      [12-31-24 @ 10:40]   256  Vitamin D (25OH) 7      [12-31-24 @ 10:40]  Lipid: chol 173, , HDL 40, LDL --      [05-04-25 @ 05:32]    HBsAg Nonreact      [12-27-24 @ 21:00]  HCV 0.10, Nonreact      [12-27-24 @ 21:00]        RADIOLOGY & ADDITIONAL STUDIES:

## 2025-05-05 NOTE — PHYSICAL THERAPY INITIAL EVALUATION ADULT - ADDITIONAL COMMENTS
pt lives alone in a 2nd floor apartment, 5 steps to enter with R rail and 15 steps inside with L rail, PTA pt used a RW outside, I inside and was I with ADLs

## 2025-05-05 NOTE — PROGRESS NOTE ADULT - SUBJECTIVE AND OBJECTIVE BOX
JORGE GARVIN 69y Female  MRN#: [id number]     Hospital Day: 1d      SUBJECTIVE  No acute events overnight, pt seen and examined this morning.                                          ----------------------------------------------------------  OBJECTIVE  PAST MEDICAL & SURGICAL HISTORY  DM (diabetes mellitus)    HTN (hypertension)    HLD (hyperlipidemia)    Obese    ESRD on hemodialysis    CAD (coronary artery disease)    H/O Spinal surgery    H/O heart artery stent    H/O  section                                              -----------------------------------------------------------  ALLERGIES:  No Known Allergies                                            ------------------------------------------------------------    HOME MEDICATIONS  Home Medications:  aspirin 81 mg oral tablet: orally once a day (04 May 2025 02:40)  Bumex 1 mg oral tablet: 1 tab(s) orally 2 times a day (04 May 2025 02:40)  calcitriol 0.25 mcg oral capsule: 1 cap(s) orally once a day (04 May 2025 02:40)  carvedilol 6.25 mg oral tablet: 1 tab(s) orally 2 times a day (27 Dec 2024 07:31)  HumaLOG KwikPen 100 units/mL injectable solution: 8 unit(s) injectable 3 times a day (27 Dec 2024 07:31)  Insulin Glargine: 24 international unit(s) subcutaneous once a day (at bedtime) (04 May 2025 02:40)  insulin glargine 100 units/mL subcutaneous solution: 24 unit(s) subcutaneous once a day (at bedtime) (27 Dec 2024 07:31)  Insulin Lispro: 8 international unit(s) subcutaneous 3 times a day (with meals) (04 May 2025 02:40)  Lipitor 40 mg oral tablet: 1 tab(s) orally once a day (at bedtime) (04 May 2025 02:40)  NIFEdipine 90 mg oral tablet, extended release: 1 tab(s) orally once a day (04 May 2025 02:40)  sodium bicarbonate: 650 milligram(s) orally 3 times a day (04 May 2025 02:40)                           MEDICATIONS:  STANDING MEDICATIONS  aspirin enteric coated 81 milliGRAM(s) Oral daily  atorvastatin 40 milliGRAM(s) Oral at bedtime  buMETAnide 1 milliGRAM(s) Oral two times a day  calcitriol   Capsule 0.25 MICROGram(s) Oral daily  carvedilol 6.25 milliGRAM(s) Oral every 12 hours  chlorhexidine 2% Cloths 1 Application(s) Topical <User Schedule>  dextrose 5%. 1000 milliLiter(s) IV Continuous <Continuous>  dextrose 5%. 1000 milliLiter(s) IV Continuous <Continuous>  dextrose 50% Injectable 25 Gram(s) IV Push once  dextrose 50% Injectable 12.5 Gram(s) IV Push once  dextrose 50% Injectable 25 Gram(s) IV Push once  glucagon  Injectable 1 milliGRAM(s) IntraMuscular once  heparin   Injectable 5000 Unit(s) SubCutaneous every 8 hours  insulin glargine Injectable (LANTUS) 24 Unit(s) SubCutaneous at bedtime  insulin lispro (ADMELOG) corrective regimen sliding scale   SubCutaneous three times a day before meals  insulin lispro (ADMELOG) corrective regimen sliding scale   SubCutaneous at bedtime  insulin lispro Injectable (ADMELOG) 6 Unit(s) SubCutaneous three times a day before meals  NIFEdipine XL 30 milliGRAM(s) Oral daily  pantoprazole    Tablet 40 milliGRAM(s) Oral before breakfast  sodium bicarbonate 650 milliGRAM(s) Oral three times a day    PRN MEDICATIONS  acetaminophen     Tablet .. 650 milliGRAM(s) Oral every 6 hours PRN  aluminum hydroxide/magnesium hydroxide/simethicone Suspension 30 milliLiter(s) Oral every 4 hours PRN  dextrose Oral Gel 15 Gram(s) Oral once PRN  melatonin 3 milliGRAM(s) Oral at bedtime PRN  ondansetron Injectable 4 milliGRAM(s) IV Push every 8 hours PRN                                            ------------------------------------------------------------  VITAL SIGNS: Last 24 Hours  T(C): 36.9 (05 May 2025 12:42), Max: 36.9 (05 May 2025 12:42)  T(F): 98.4 (05 May 2025 12:42), Max: 98.4 (05 May 2025 12:42)  HR: 74 (05 May 2025 12:42) (67 - 74)  BP: 136/75 (05 May 2025 12:42) (127/71 - 147/84)  BP(mean): 105 (05 May 2025 05:17) (105 - 105)  RR: 16 (05 May 2025 12:42) (16 - 17)  SpO2: 99% (05 May 2025 12:42) (95% - 99%)      25 @ 07:01  -  25 @ 07:00  --------------------------------------------------------  IN: 561 mL / OUT: 650 mL / NET: -89 mL    25 @ 07:01  -  25 @ 13:00  --------------------------------------------------------  IN: 325 mL / OUT: 700 mL / NET: -375 mL                                             --------------------------------------------------------------  LABS:                        8.9    10.44 )-----------( 265      ( 04 May 2025 05:32 )             27.2     05    142  |  99  |  18  ----------------------------<  105[H]  4.1   |  29  |  3.5[H]    Ca    8.7      04 May 2025 05:32  Mg     2.2     05-03    TPro  6.2  /  Alb  3.5  /  TBili  0.3  /  DBili  x   /  AST  12  /  ALT  <5  /  AlkPhos  65  05-04      Urinalysis Basic - ( 04 May 2025 05:32 )    Color: x / Appearance: x / SG: x / pH: x  Gluc: 105 mg/dL / Ketone: x  / Bili: x / Urobili: x   Blood: x / Protein: x / Nitrite: x   Leuk Esterase: x / RBC: x / WBC x   Sq Epi: x / Non Sq Epi: x / Bacteria: x                                                            -------------------------------------------------------------  RADIOLOGY:  CXR  Xray Chest 1 View- PORTABLE-Urgent:   ACC: [id number] EXAM:  XR CHEST PORTABLE URGENT 1V   ORDERED BY: ISACC GALLAGHER     PROCEDURE DATE:  2025          INTERPRETATION:  CLINICAL HISTORY: Syncope.    COMPARISON: Chest    2024. Correlation is also made with PET/CT 3/28/2025.    TECHNIQUE: Portable frontal chest radiograph. Lordotic positioning,   mildly rotated.    FINDINGS:    Support devices: Right central venous catheter, dual-lumen in appropriate   position.    Cardiac/mediastinum/hilum: Normal cardiac size given positioning.    Lung parenchyma/Pleura: Left greater than right basilar opacities. No   pneumothorax.    Skeleton/soft tissues: Degenerative osseous changes.      IMPRESSION:    Left greater than right basilar opacities.    --- End of Report ---            AARON OJEDA MD; Attending Radiologist  This document has been electronically signed. May  4 2025 10:54AM (25 @ 20:49)      CT                                            --------------------------------------------------------------    PHYSICAL EXAM:  GENERAL: NAD, lying in bed comfortably  CHEST/LUNG: Clear to auscultation bilaterally; No rales, rhonchi, wheezing, or rubs. Unlabored respirations  HEART: Regular rate and rhythm; No murmurs, rubs, or gallops  ABDOMEN: Soft, nontender, nondistended  EXTREMITIES:  No clubbing, cyanosis, or edema  NERVOUS SYSTEM:  A&Ox3

## 2025-05-06 VITALS — DIASTOLIC BLOOD PRESSURE: 62 MMHG | SYSTOLIC BLOOD PRESSURE: 138 MMHG | HEART RATE: 77 BPM

## 2025-05-06 LAB
ALBUMIN SERPL ELPH-MCNC: 3.3 G/DL — LOW (ref 3.5–5.2)
ALP SERPL-CCNC: 62 U/L — SIGNIFICANT CHANGE UP (ref 30–115)
ALT FLD-CCNC: <5 U/L — SIGNIFICANT CHANGE UP (ref 0–41)
ANION GAP SERPL CALC-SCNC: 15 MMOL/L — HIGH (ref 7–14)
AST SERPL-CCNC: 11 U/L — SIGNIFICANT CHANGE UP (ref 0–41)
BASOPHILS # BLD AUTO: 0.02 K/UL — SIGNIFICANT CHANGE UP (ref 0–0.2)
BASOPHILS NFR BLD AUTO: 0.2 % — SIGNIFICANT CHANGE UP (ref 0–1)
BILIRUB SERPL-MCNC: <0.2 MG/DL — SIGNIFICANT CHANGE UP (ref 0.2–1.2)
BUN SERPL-MCNC: 46 MG/DL — HIGH (ref 10–20)
CALCIUM SERPL-MCNC: 8.5 MG/DL — SIGNIFICANT CHANGE UP (ref 8.4–10.5)
CHLORIDE SERPL-SCNC: 102 MMOL/L — SIGNIFICANT CHANGE UP (ref 98–110)
CO2 SERPL-SCNC: 25 MMOL/L — SIGNIFICANT CHANGE UP (ref 17–32)
CREAT SERPL-MCNC: 6.3 MG/DL — CRITICAL HIGH (ref 0.7–1.5)
EGFR: 7 ML/MIN/1.73M2 — LOW
EGFR: 7 ML/MIN/1.73M2 — LOW
EOSINOPHIL # BLD AUTO: 0.21 K/UL — SIGNIFICANT CHANGE UP (ref 0–0.7)
EOSINOPHIL NFR BLD AUTO: 2.5 % — SIGNIFICANT CHANGE UP (ref 0–8)
GLUCOSE BLDC GLUCOMTR-MCNC: 100 MG/DL — HIGH (ref 70–99)
GLUCOSE BLDC GLUCOMTR-MCNC: 153 MG/DL — HIGH (ref 70–99)
GLUCOSE SERPL-MCNC: 98 MG/DL — SIGNIFICANT CHANGE UP (ref 70–99)
HCT VFR BLD CALC: 26.9 % — LOW (ref 37–47)
HGB BLD-MCNC: 8.6 G/DL — LOW (ref 12–16)
IMM GRANULOCYTES NFR BLD AUTO: 0.5 % — HIGH (ref 0.1–0.3)
LYMPHOCYTES # BLD AUTO: 1.79 K/UL — SIGNIFICANT CHANGE UP (ref 1.2–3.4)
LYMPHOCYTES # BLD AUTO: 21.6 % — SIGNIFICANT CHANGE UP (ref 20.5–51.1)
MAGNESIUM SERPL-MCNC: 2.6 MG/DL — HIGH (ref 1.8–2.4)
MCHC RBC-ENTMCNC: 30.6 PG — SIGNIFICANT CHANGE UP (ref 27–31)
MCHC RBC-ENTMCNC: 32 G/DL — SIGNIFICANT CHANGE UP (ref 32–37)
MCV RBC AUTO: 95.7 FL — SIGNIFICANT CHANGE UP (ref 81–99)
MONOCYTES # BLD AUTO: 0.68 K/UL — HIGH (ref 0.1–0.6)
MONOCYTES NFR BLD AUTO: 8.2 % — SIGNIFICANT CHANGE UP (ref 1.7–9.3)
NEUTROPHILS # BLD AUTO: 5.54 K/UL — SIGNIFICANT CHANGE UP (ref 1.4–6.5)
NEUTROPHILS NFR BLD AUTO: 67 % — SIGNIFICANT CHANGE UP (ref 42.2–75.2)
NRBC BLD AUTO-RTO: 0 /100 WBCS — SIGNIFICANT CHANGE UP (ref 0–0)
PLATELET # BLD AUTO: 241 K/UL — SIGNIFICANT CHANGE UP (ref 130–400)
PMV BLD: 10.7 FL — HIGH (ref 7.4–10.4)
POTASSIUM SERPL-MCNC: 3.9 MMOL/L — SIGNIFICANT CHANGE UP (ref 3.5–5)
POTASSIUM SERPL-SCNC: 3.9 MMOL/L — SIGNIFICANT CHANGE UP (ref 3.5–5)
PROT SERPL-MCNC: 6 G/DL — SIGNIFICANT CHANGE UP (ref 6–8)
RBC # BLD: 2.81 M/UL — LOW (ref 4.2–5.4)
RBC # FLD: 13.6 % — SIGNIFICANT CHANGE UP (ref 11.5–14.5)
SODIUM SERPL-SCNC: 142 MMOL/L — SIGNIFICANT CHANGE UP (ref 135–146)
WBC # BLD: 8.28 K/UL — SIGNIFICANT CHANGE UP (ref 4.8–10.8)
WBC # FLD AUTO: 8.28 K/UL — SIGNIFICANT CHANGE UP (ref 4.8–10.8)

## 2025-05-06 PROCEDURE — 99239 HOSP IP/OBS DSCHRG MGMT >30: CPT

## 2025-05-06 RX ADMIN — HEPARIN SODIUM 5000 UNIT(S): 1000 INJECTION INTRAVENOUS; SUBCUTANEOUS at 05:30

## 2025-05-06 RX ADMIN — HEPARIN SODIUM 5000 UNIT(S): 1000 INJECTION INTRAVENOUS; SUBCUTANEOUS at 16:37

## 2025-05-06 RX ADMIN — Medication 30 MILLIGRAM(S): at 05:29

## 2025-05-06 RX ADMIN — BUMETANIDE 1 MILLIGRAM(S): 1 TABLET ORAL at 16:37

## 2025-05-06 RX ADMIN — INSULIN LISPRO 1: 100 INJECTION, SOLUTION INTRAVENOUS; SUBCUTANEOUS at 11:28

## 2025-05-06 RX ADMIN — Medication 1 APPLICATION(S): at 05:32

## 2025-05-06 RX ADMIN — CALCITRIOL 0.25 MICROGRAM(S): 0.5 CAPSULE, GELATIN COATED ORAL at 11:25

## 2025-05-06 RX ADMIN — BUMETANIDE 1 MILLIGRAM(S): 1 TABLET ORAL at 05:29

## 2025-05-06 RX ADMIN — Medication 650 MILLIGRAM(S): at 16:37

## 2025-05-06 RX ADMIN — Medication 81 MILLIGRAM(S): at 11:25

## 2025-05-06 RX ADMIN — Medication 40 MILLIGRAM(S): at 05:29

## 2025-05-06 RX ADMIN — Medication 650 MILLIGRAM(S): at 05:29

## 2025-05-06 RX ADMIN — INSULIN LISPRO 6 UNIT(S): 100 INJECTION, SOLUTION INTRAVENOUS; SUBCUTANEOUS at 11:27

## 2025-05-06 NOTE — PROGRESS NOTE ADULT - SUBJECTIVE AND OBJECTIVE BOX
SUBJECTIVE/OVERNIGHT EVENTS  Today is hospital day 2d. This morning patient was seen and examined at bedside, resting comfortably in bed. No acute or major events overnight.    HOSPITAL COURSE  Day 1:   Day 2:   Day 3:     CODE STATUS:    FAMILY COMMUNICATION  Contact date:  Name of person contacted:  Relationship to patient:  Communication details:    MEDICATIONS  STANDING MEDICATIONS  aspirin enteric coated 81 milliGRAM(s) Oral daily  atorvastatin 40 milliGRAM(s) Oral at bedtime  buMETAnide 1 milliGRAM(s) Oral two times a day  calcitriol   Capsule 0.25 MICROGram(s) Oral daily  chlorhexidine 2% Cloths 1 Application(s) Topical <User Schedule>  dextrose 5%. 1000 milliLiter(s) IV Continuous <Continuous>  dextrose 5%. 1000 milliLiter(s) IV Continuous <Continuous>  dextrose 50% Injectable 25 Gram(s) IV Push once  dextrose 50% Injectable 12.5 Gram(s) IV Push once  dextrose 50% Injectable 25 Gram(s) IV Push once  glucagon  Injectable 1 milliGRAM(s) IntraMuscular once  heparin   Injectable 5000 Unit(s) SubCutaneous every 8 hours  insulin glargine Injectable (LANTUS) 24 Unit(s) SubCutaneous at bedtime  insulin lispro (ADMELOG) corrective regimen sliding scale   SubCutaneous three times a day before meals  insulin lispro (ADMELOG) corrective regimen sliding scale   SubCutaneous at bedtime  insulin lispro Injectable (ADMELOG) 6 Unit(s) SubCutaneous three times a day before meals  NIFEdipine XL 30 milliGRAM(s) Oral daily  pantoprazole    Tablet 40 milliGRAM(s) Oral before breakfast  sodium bicarbonate 650 milliGRAM(s) Oral three times a day    PRN MEDICATIONS  acetaminophen     Tablet .. 650 milliGRAM(s) Oral every 6 hours PRN  aluminum hydroxide/magnesium hydroxide/simethicone Suspension 30 milliLiter(s) Oral every 4 hours PRN  dextrose Oral Gel 15 Gram(s) Oral once PRN  melatonin 3 milliGRAM(s) Oral at bedtime PRN  ondansetron Injectable 4 milliGRAM(s) IV Push every 8 hours PRN    VITALS  T(F): 97.9 (05-06-25 @ 05:11), Max: 98 (05-05-25 @ 20:56)  HR: 77 (05-06-25 @ 16:29) (67 - 78)  BP: 138/62 (05-06-25 @ 16:29) (138/62 - 191/76)  RR: 18 (05-06-25 @ 14:45) (17 - 18)  SpO2: 99% (05-06-25 @ 05:11) (99% - 99%)  POCT Blood Glucose.: 100 mg/dL (05-06-25 @ 16:21)  POCT Blood Glucose.: 153 mg/dL (05-06-25 @ 11:18)  POCT Blood Glucose.: 99 mg/dL (05-06-25 @ 07:42)  POCT Blood Glucose.: 122 mg/dL (05-05-25 @ 21:26)    PHYSICAL EXAM  T(C): 36.6 (05-06-25 @ 05:11), Max: 36.7 (05-05-25 @ 20:56)  HR: 77 (05-06-25 @ 16:29) (67 - 78)  BP: 138/62 (05-06-25 @ 16:29) (138/62 - 191/76)  RR: 18 (05-06-25 @ 14:45) (17 - 18)  SpO2: 99% (05-06-25 @ 05:11) (99% - 99%)    CONSTITUTIONAL: Well groomed, no apparent distress  EYES: PERRLA and symmetric, EOMI, No conjunctival or scleral injection, non-icteric  ENMT: Oral mucosa with moist membranes. Normal dentition; no pharyngeal injection or exudates             NECK: Supple, symmetric and without tracheal deviation   RESP: No respiratory distress, no use of accessory muscles; CTA b/l, no WRR  CV: RRR, +S1S2, no MRG; no JVD; no peripheral edema  GI: Soft, NT, ND, no rebound, no guarding; no palpable masses; no hepatosplenomegaly; no hernia palpated  LYMPH: No cervical LAD or tenderness; no axillary LAD or tenderness; no inguinal LAD or tenderness  MSK: Normal gait; No digital clubbing or cyanosis; examination of the (head/neck/spine/ribs/pelvis, RUE, LUE, RLE, LLE) without misalignment,            Normal ROM without pain, no spinal tenderness, normal muscle strength/tone  SKIN: No rashes or ulcers noted; no subcutaneous nodules or induration palpable  NEURO: CN II-XII intact; normal reflexes in upper and lower extremities, sensation intact in upper and lower extremities b/l to light touch   PSYCH: Appropriate insight/judgment; A+O x 3, mood and affect appropriate, recent/remote memory intact  (  ) Indwelling Cardenas Catheter   Date insterted:    Reason (  ) Critical illness     (  ) urinary retention    (  ) Accurate Ins/Outs Monitoring     (  ) CMO patient    (  ) Central Line  Date inserted:  Location: (  ) Right IJ   (  ) Left IJ   (  ) Right Fem   (  ) Left Fem    (  ) SPC  (  ) pigtail  (  ) PEG tube  (  ) colostomy  (  ) jejunostomy  (  ) U-Dall    LABS             8.6    8.28  )-----------( 241      ( 05-06-25 @ 06:15 )             26.9     142  |  102  |  46  -------------------------<  98   05-06-25 @ 06:15  3.9  |  25  |  6.3    Ca      8.5     05-06-25 @ 06:15  Mg     2.6     05-06-25 @ 06:15    TPro  6.0  /  Alb  3.3  /  TBili  <0.2  /  DBili  x   /  AST  11  /  ALT  <5  /  AlkPhos  62  /  GGT  x     05-06-25 @ 06:15      Troponin T, High Sensitivity Result: 66 ng/L (05-03-25 @ 22:22)  Troponin T, High Sensitivity Result: 72 ng/L (05-03-25 @ 20:22)    Urinalysis Basic - ( 06 May 2025 06:15 )    Color: x / Appearance: x / SG: x / pH: x  Gluc: 98 mg/dL / Ketone: x  / Bili: x / Urobili: x   Blood: x / Protein: x / Nitrite: x   Leuk Esterase: x / RBC: x / WBC x   Sq Epi: x / Non Sq Epi: x / Bacteria: x          IMAGING SUBJECTIVE/OVERNIGHT EVENTS  Today is hospital day 2d. This morning patient was seen and examined at bedside, resting comfortably in bed. No acute or major events overnight.    HOSPITAL COURSE  Day 1:   Day 2:   Day 3:     CODE STATUS:    FAMILY COMMUNICATION  Contact date:  Name of person contacted:  Relationship to patient:  Communication details:    MEDICATIONS  STANDING MEDICATIONS  aspirin enteric coated 81 milliGRAM(s) Oral daily  atorvastatin 40 milliGRAM(s) Oral at bedtime  buMETAnide 1 milliGRAM(s) Oral two times a day  calcitriol   Capsule 0.25 MICROGram(s) Oral daily  chlorhexidine 2% Cloths 1 Application(s) Topical <User Schedule>  dextrose 5%. 1000 milliLiter(s) IV Continuous <Continuous>  dextrose 5%. 1000 milliLiter(s) IV Continuous <Continuous>  dextrose 50% Injectable 25 Gram(s) IV Push once  dextrose 50% Injectable 12.5 Gram(s) IV Push once  dextrose 50% Injectable 25 Gram(s) IV Push once  glucagon  Injectable 1 milliGRAM(s) IntraMuscular once  heparin   Injectable 5000 Unit(s) SubCutaneous every 8 hours  insulin glargine Injectable (LANTUS) 24 Unit(s) SubCutaneous at bedtime  insulin lispro (ADMELOG) corrective regimen sliding scale   SubCutaneous three times a day before meals  insulin lispro (ADMELOG) corrective regimen sliding scale   SubCutaneous at bedtime  insulin lispro Injectable (ADMELOG) 6 Unit(s) SubCutaneous three times a day before meals  NIFEdipine XL 30 milliGRAM(s) Oral daily  pantoprazole    Tablet 40 milliGRAM(s) Oral before breakfast  sodium bicarbonate 650 milliGRAM(s) Oral three times a day    PRN MEDICATIONS  acetaminophen     Tablet .. 650 milliGRAM(s) Oral every 6 hours PRN  aluminum hydroxide/magnesium hydroxide/simethicone Suspension 30 milliLiter(s) Oral every 4 hours PRN  dextrose Oral Gel 15 Gram(s) Oral once PRN  melatonin 3 milliGRAM(s) Oral at bedtime PRN  ondansetron Injectable 4 milliGRAM(s) IV Push every 8 hours PRN    VITALS  T(F): 97.9 (05-06-25 @ 05:11), Max: 98 (05-05-25 @ 20:56)  HR: 77 (05-06-25 @ 16:29) (67 - 78)  BP: 138/62 (05-06-25 @ 16:29) (138/62 - 191/76)  RR: 18 (05-06-25 @ 14:45) (17 - 18)  SpO2: 99% (05-06-25 @ 05:11) (99% - 99%)  POCT Blood Glucose.: 100 mg/dL (05-06-25 @ 16:21)  POCT Blood Glucose.: 153 mg/dL (05-06-25 @ 11:18)  POCT Blood Glucose.: 99 mg/dL (05-06-25 @ 07:42)  POCT Blood Glucose.: 122 mg/dL (05-05-25 @ 21:26)    PHYSICAL EXAM  T(C): 36.6 (05-06-25 @ 05:11), Max: 36.7 (05-05-25 @ 20:56)  HR: 77 (05-06-25 @ 16:29) (67 - 78)  BP: 138/62 (05-06-25 @ 16:29) (138/62 - 191/76)  RR: 18 (05-06-25 @ 14:45) (17 - 18)  SpO2: 99% (05-06-25 @ 05:11) (99% - 99%)    CONSTITUTIONAL: Well groomed, no apparent distress  EYES: PERRLA and symmetric, EOMI, No conjunctival or scleral injection, non-icteric  ENMT: Oral mucosa with moist membranes. Normal dentition; no pharyngeal injection or exudates             NECK: Supple, symmetric and without tracheal deviation   RESP: No respiratory distress, no use of accessory muscles; CTA b/l, no WRR  CV: RRR, +S1S2, no MRG; no JVD; peripheral edema  GI: Soft, NT, ND, no rebound, no guarding; no palpable masses  MSK: Normal ROM without pain,normal muscle strength/tone  SKIN: No rashes or ulcers noted; no subcutaneous nodules or induration palpable  NEURO: Grossly intact   PSYCH: Appropriate insight/judgment; A+O x 3, mood and affect appropriate, recent/remote memory intact  (  ) Indwelling Cardenas Catheter   Date insterted:    Reason (  ) Critical illness     (  ) urinary retention    (  ) Accurate Ins/Outs Monitoring     (  ) CMO patient    (  ) Central Line  Date inserted:  Location: (  ) Right IJ   (  ) Left IJ   (  ) Right Fem   (  ) Left Fem    (  ) SPC  (  ) pigtail  (  ) PEG tube  (  ) colostomy  (  ) jejunostomy  (  ) U-Dall    LABS             8.6    8.28  )-----------( 241      ( 05-06-25 @ 06:15 )             26.9     142  |  102  |  46  -------------------------<  98   05-06-25 @ 06:15  3.9  |  25  |  6.3    Ca      8.5     05-06-25 @ 06:15  Mg     2.6     05-06-25 @ 06:15    TPro  6.0  /  Alb  3.3  /  TBili  <0.2  /  DBili  x   /  AST  11  /  ALT  <5  /  AlkPhos  62  /  GGT  x     05-06-25 @ 06:15      Troponin T, High Sensitivity Result: 66 ng/L (05-03-25 @ 22:22)  Troponin T, High Sensitivity Result: 72 ng/L (05-03-25 @ 20:22)    Urinalysis Basic - ( 06 May 2025 06:15 )    Color: x / Appearance: x / SG: x / pH: x  Gluc: 98 mg/dL / Ketone: x  / Bili: x / Urobili: x   Blood: x / Protein: x / Nitrite: x   Leuk Esterase: x / RBC: x / WBC x   Sq Epi: x / Non Sq Epi: x / Bacteria: x          IMAGING

## 2025-05-06 NOTE — PROGRESS NOTE ADULT - SUBJECTIVE AND OBJECTIVE BOX
seen and examined  24 h events noted   no distress         PAST HISTORY  --------------------------------------------------------------------------------  No significant changes to PMH, PSH, FHx, SHx, unless otherwise noted    ALLERGIES & MEDICATIONS  --------------------------------------------------------------------------------  Allergies    No Known Allergies    Intolerances      Standing Inpatient Medications  aspirin enteric coated 81 milliGRAM(s) Oral daily  atorvastatin 40 milliGRAM(s) Oral at bedtime  buMETAnide 1 milliGRAM(s) Oral two times a day  calcitriol   Capsule 0.25 MICROGram(s) Oral daily  chlorhexidine 2% Cloths 1 Application(s) Topical <User Schedule>  dextrose 5%. 1000 milliLiter(s) IV Continuous <Continuous>  dextrose 5%. 1000 milliLiter(s) IV Continuous <Continuous>  dextrose 50% Injectable 25 Gram(s) IV Push once  dextrose 50% Injectable 12.5 Gram(s) IV Push once  dextrose 50% Injectable 25 Gram(s) IV Push once  glucagon  Injectable 1 milliGRAM(s) IntraMuscular once  heparin   Injectable 5000 Unit(s) SubCutaneous every 8 hours  insulin glargine Injectable (LANTUS) 24 Unit(s) SubCutaneous at bedtime  insulin lispro (ADMELOG) corrective regimen sliding scale   SubCutaneous three times a day before meals  insulin lispro (ADMELOG) corrective regimen sliding scale   SubCutaneous at bedtime  insulin lispro Injectable (ADMELOG) 6 Unit(s) SubCutaneous three times a day before meals  NIFEdipine XL 30 milliGRAM(s) Oral daily  pantoprazole    Tablet 40 milliGRAM(s) Oral before breakfast  sodium bicarbonate 650 milliGRAM(s) Oral three times a day    PRN Inpatient Medications  acetaminophen     Tablet .. 650 milliGRAM(s) Oral every 6 hours PRN  aluminum hydroxide/magnesium hydroxide/simethicone Suspension 30 milliLiter(s) Oral every 4 hours PRN  dextrose Oral Gel 15 Gram(s) Oral once PRN  melatonin 3 milliGRAM(s) Oral at bedtime PRN  ondansetron Injectable 4 milliGRAM(s) IV Push every 8 hours PRN      VITALS/PHYSICAL EXAM  --------------------------------------------------------------------------------  T(C): 36.6 (05-06-25 @ 05:11), Max: 36.9 (05-05-25 @ 12:42)  HR: 67 (05-06-25 @ 05:11) (67 - 78)  BP: 147/74 (05-06-25 @ 05:11) (136/75 - 191/76)  RR: 18 (05-06-25 @ 05:11) (16 - 18)  SpO2: 99% (05-06-25 @ 05:11) (99% - 99%)  Wt(kg): --        05-05-25 @ 07:01  -  05-06-25 @ 07:00  --------------------------------------------------------  IN: 545 mL / OUT: 900 mL / NET: -355 mL      Physical Exam:  	Gen: NAD  	Pulm: CTA B/L  	CV:  S1S2; no rub  	Abd:  soft, /nondistended  	LE: no edema  	Vascular access:tdc     LABS/STUDIES  --------------------------------------------------------------------------------              8.6    8.28  >-----------<  241      [05-06-25 @ 06:15]              26.9     142  |  102  |  46  ----------------------------<  98      [05-06-25 @ 06:15]  3.9   |  25  |  6.3        Ca     8.5     [05-06-25 @ 06:15]      Mg     2.6     [05-06-25 @ 06:15]    TPro  6.0  /  Alb  3.3  /  TBili  <0.2  /  DBili  x   /  AST  11  /  ALT  <5  /  AlkPhos  62  [05-06-25 @ 06:15]    Creatinine Trend:  SCr 6.3 [05-06 @ 06:15]  SCr 3.5 [05-04 @ 05:32]  SCr 2.8 [05-03 @ 20:22]    Urinalysis - [05-06-25 @ 06:15]      Color  / Appearance  / SG  / pH       Gluc 98 / Ketone   / Bili  / Urobili        Blood  / Protein  / Leuk Est  / Nitrite       RBC  / WBC  / Hyaline  / Gran  / Sq Epi  / Non Sq Epi  / Bacteria       PTH -- (Ca 6.6)      [12-31-24 @ 10:40]   256  Vitamin D (25OH) 7      [12-31-24 @ 10:40]  Lipid: chol 173, , HDL 40, LDL --      [05-04-25 @ 05:32]
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
## 2025-05-13 DIAGNOSIS — D63.1 ANEMIA IN CHRONIC KIDNEY DISEASE: ICD-10-CM

## 2025-05-13 DIAGNOSIS — I25.10 ATHEROSCLEROTIC HEART DISEASE OF NATIVE CORONARY ARTERY WITHOUT ANGINA PECTORIS: ICD-10-CM

## 2025-05-13 DIAGNOSIS — N18.6 END STAGE RENAL DISEASE: ICD-10-CM

## 2025-05-13 DIAGNOSIS — Z99.2 DEPENDENCE ON RENAL DIALYSIS: ICD-10-CM

## 2025-05-13 DIAGNOSIS — Z87.891 PERSONAL HISTORY OF NICOTINE DEPENDENCE: ICD-10-CM

## 2025-05-13 DIAGNOSIS — I12.0 HYPERTENSIVE CHRONIC KIDNEY DISEASE WITH STAGE 5 CHRONIC KIDNEY DISEASE OR END STAGE RENAL DISEASE: ICD-10-CM

## 2025-05-13 DIAGNOSIS — I95.1 ORTHOSTATIC HYPOTENSION: ICD-10-CM

## 2025-05-13 DIAGNOSIS — Z79.82 LONG TERM (CURRENT) USE OF ASPIRIN: ICD-10-CM

## 2025-05-13 DIAGNOSIS — E11.22 TYPE 2 DIABETES MELLITUS WITH DIABETIC CHRONIC KIDNEY DISEASE: ICD-10-CM

## 2025-05-13 DIAGNOSIS — I27.20 PULMONARY HYPERTENSION, UNSPECIFIED: ICD-10-CM

## 2025-05-13 DIAGNOSIS — R79.89 OTHER SPECIFIED ABNORMAL FINDINGS OF BLOOD CHEMISTRY: ICD-10-CM

## 2025-05-13 DIAGNOSIS — E78.5 HYPERLIPIDEMIA, UNSPECIFIED: ICD-10-CM

## 2025-05-13 DIAGNOSIS — Z95.5 PRESENCE OF CORONARY ANGIOPLASTY IMPLANT AND GRAFT: ICD-10-CM

## 2025-05-13 DIAGNOSIS — Z79.4 LONG TERM (CURRENT) USE OF INSULIN: ICD-10-CM

## 2025-05-28 PROBLEM — I25.10 ATHEROSCLEROTIC HEART DISEASE OF NATIVE CORONARY ARTERY WITHOUT ANGINA PECTORIS: Chronic | Status: ACTIVE | Noted: 2025-05-03

## 2025-05-28 PROBLEM — N18.6 END STAGE RENAL DISEASE: Chronic | Status: ACTIVE | Noted: 2025-05-03

## 2025-06-05 ENCOUNTER — APPOINTMENT (OUTPATIENT)
Dept: INTERVENTIONAL RADIOLOGY/VASCULAR | Facility: CLINIC | Age: 70
End: 2025-06-05
Payer: MEDICARE

## 2025-06-05 VITALS
SYSTOLIC BLOOD PRESSURE: 124 MMHG | OXYGEN SATURATION: 99 % | HEART RATE: 77 BPM | DIASTOLIC BLOOD PRESSURE: 72 MMHG | TEMPERATURE: 97.5 F

## 2025-06-05 PROCEDURE — 99212 OFFICE O/P EST SF 10 MIN: CPT

## 2025-06-18 ENCOUNTER — OUTPATIENT (OUTPATIENT)
Dept: OUTPATIENT SERVICES | Facility: HOSPITAL | Age: 70
LOS: 1 days | Discharge: HOME | End: 2025-06-18
Payer: MEDICARE

## 2025-06-18 ENCOUNTER — RESULT REVIEW (OUTPATIENT)
Age: 70
End: 2025-06-18

## 2025-06-18 ENCOUNTER — TRANSCRIPTION ENCOUNTER (OUTPATIENT)
Age: 70
End: 2025-06-18

## 2025-06-18 VITALS
HEIGHT: 62 IN | RESPIRATION RATE: 18 BRPM | WEIGHT: 205.03 LBS | HEART RATE: 74 BPM | OXYGEN SATURATION: 98 % | DIASTOLIC BLOOD PRESSURE: 67 MMHG | TEMPERATURE: 97 F | SYSTOLIC BLOOD PRESSURE: 144 MMHG

## 2025-06-18 VITALS
DIASTOLIC BLOOD PRESSURE: 67 MMHG | RESPIRATION RATE: 18 BRPM | SYSTOLIC BLOOD PRESSURE: 156 MMHG | HEART RATE: 72 BPM | OXYGEN SATURATION: 98 %

## 2025-06-18 DIAGNOSIS — Z98.891 HISTORY OF UTERINE SCAR FROM PREVIOUS SURGERY: Chronic | ICD-10-CM

## 2025-06-18 DIAGNOSIS — Z98.890 OTHER SPECIFIED POSTPROCEDURAL STATES: Chronic | ICD-10-CM

## 2025-06-18 DIAGNOSIS — Z95.5 PRESENCE OF CORONARY ANGIOPLASTY IMPLANT AND GRAFT: Chronic | ICD-10-CM

## 2025-06-18 LAB
GAS PNL BLDV: SIGNIFICANT CHANGE UP
GLUCOSE BLDC GLUCOMTR-MCNC: 118 MG/DL — HIGH (ref 70–99)

## 2025-06-18 PROCEDURE — C1769: CPT

## 2025-06-18 PROCEDURE — 85018 HEMOGLOBIN: CPT

## 2025-06-18 PROCEDURE — 36836 PRQ AV FSTL CRTJ UXTR 1 ACS: CPT | Mod: RT

## 2025-06-18 PROCEDURE — 82803 BLOOD GASES ANY COMBINATION: CPT

## 2025-06-18 PROCEDURE — 82962 GLUCOSE BLOOD TEST: CPT

## 2025-06-18 PROCEDURE — 85014 HEMATOCRIT: CPT

## 2025-06-18 PROCEDURE — 83605 ASSAY OF LACTIC ACID: CPT

## 2025-06-18 PROCEDURE — C1889: CPT

## 2025-06-18 PROCEDURE — C1725: CPT

## 2025-06-18 PROCEDURE — 84295 ASSAY OF SERUM SODIUM: CPT

## 2025-06-18 PROCEDURE — 82330 ASSAY OF CALCIUM: CPT

## 2025-06-18 PROCEDURE — 84132 ASSAY OF SERUM POTASSIUM: CPT

## 2025-06-18 PROCEDURE — C1894: CPT

## 2025-06-18 NOTE — H&P ADULT - ASSESSMENT
70 yo F with hx of HTN, HLD, DM, ESRD on HD (Tues/Thurs/Sat), CAD s/p PCI x1 who presents to IR for an image guided right fistula creation with sedation/anesthesia.

## 2025-06-18 NOTE — ASU PATIENT PROFILE, ADULT - FALL HARM RISK - HARM RISK INTERVENTIONS

## 2025-06-18 NOTE — PRE-ANESTHESIA EVALUATION ADULT - NSRADCARDRESULTSFT_GEN_ALL_CORE
Summary:   1. Left ventricular ejection fraction, by visual estimation, is 65 to   70%.   2. Normal global left ventricular systolic function.   3. Moderate concentric left ventricular hypertrophy.   4. Spectral Doppler shows impaired relaxation pattern of left   ventricular myocardial filling (Grade I diastolic dysfunction).   5. Mildly enlarged left atrium.   6. Normal right atrial size.   7. Trace mitral valve regurgitation.   8. Sclerotic aortic valve with normal opening.   9. Estimated pulmonary artery systolic pressure is 35.0 mmHg assuming a   right atrial pressure of 3 mmHg, which is consistent with borderline   pulmonary hypertension.  10. Endocardial visualization was enhanced with intravenous echo contrast.    PHYSICIAN INTERPRETATION:  Left Ventricle: Endocardial visualization was enhanced with intravenous   echo contrast. The left ventricular internal cavity size is normal. There   is moderate concentric left ventricular hypertrophy. Global LV systolic   function was normal. Left ventricular ejection fraction, by visual   estimation, is 65 to 70%. Spectral Doppler shows impaired relaxation   pattern of left ventricular myocardial filling (Grade I diastolic   dysfunction).  Right Ventricle: Normal right ventricular size and function.  Left Atrium: Mildly enlarged left atrium.  Right Atrium: Normal right atrial size.  Pericardium: Trivial pericardial effusion is present.  Mitral Valve: Structurally normal mitral valve, with normal leaflet   excursion. Trace mitral valve regurgitation is seen.  Tricuspid Valve: Structurally normal tricuspid valve, with normal leaflet   excursion. Trivial tricuspid regurgitation is visualized. Estimated   pulmonary artery systolic pressure is 35.0 mmHg assuming a right atrial   pressure of 3 mmHg, which is consistent with borderline pulmonary   hypertension.  Aortic Valve: The aortic valve is trileaflet. No evidence of aortic   stenosis. Sclerotic aortic valve with normal opening. No evidence of   aortic valve regurgitation is seen.  Pulmonic Valve: Structurally normal pulmonic valve, with normal leaflet   excursion. Trace pulmonic valve regurgitation.  Aorta: The aortic root and ascending aorta are structurally normal, with   no evidence of dilitation.  Pulmonary Artery: The main pulmonary artery is normal in size.      Ventricular Rate 77 BPM    Atrial Rate 77 BPM    P-R Interval 146 ms    QRS Duration 88 ms    Q-T Interval 404 ms    QTC Calculation(Bazett) 457 ms    P Axis 70 degrees    R Axis 95 degrees    T Axis 76 degrees    Diagnosis Line Normal sinus rhythm  Rightward axis  Anteroseptal infarct , age undetermined  Abnormal ECG    PROCEDURE DATE:  05/03/2025          INTERPRETATION:  CLINICAL HISTORY: Syncope.    COMPARISON: Chest    12/28/2024. Correlation is also made with PET/CT 3/28/2025.    TECHNIQUE: Portable frontal chest radiograph. Lordotic positioning,   mildly rotated.    FINDINGS:    Support devices: Right central venous catheter, dual-lumen in appropriate   position.    Cardiac/mediastinum/hilum: Normal cardiac size given positioning.    Lung parenchyma/Pleura: Left greater than right basilar opacities. No   pneumothorax.    Skeleton/soft tissues: Degenerative osseous changes.      IMPRESSION:    Left greater than right basilar opacities.

## 2025-06-18 NOTE — H&P ADULT - HISTORY OF PRESENT ILLNESS
Patient is 70 yo F with hx of HTN, HLD, DM, ESRD on HD (Tues/Thurs/Sat), CAD s/p PCI x1 who presents to IR for an image guided right AV fistula creation with sedation/anesthesia.

## 2025-06-18 NOTE — PRE-ANESTHESIA EVALUATION ADULT - NSANTHPMHFT_GEN_ALL_CORE
68 yo F with ESRD last HD yesterday via right chest cath, DM BG 119mg/dL , HTN, CAD s/p PCI stent x 1 2004  ET limited due to back pain, SOB especially with stairs

## 2025-06-18 NOTE — PRE-ANESTHESIA EVALUATION ADULT - NSANTHADDINFOFT_GEN_ALL_CORE
[facility]  Vascular Neurology  Comprehensive Stroke Center  Progress Note    Assessment/Plan:     * Embolic stroke involving right middle cerebral artery  Donita Gonzalez is a 65 y.o. female with a significant medical history of COPD, HTN, smoker who presents to the hospital as a transfer from [facility] for further evaluation of R MCA stroke.  The patient did not receive tPA due to LKN 8/25/2020 and presenting to the OSH on 8/26/2020.      Antithrombotics for secondary stroke prevention: Anticoagulants: Patient was on full dose Lovenox that was dc'd 2/2 uncontrolled epistaxis. Heparin infusion started 9/1/20 for A/C.  Repeat CTH 9/2 normal, continue heparin drip    Statins for secondary stroke prevention and hyperlipidemia, if present:   Statins: Atorvastatin- 40 mg daily    Aggressive risk factor modification: HTN, Smoking, HLD, A-Fib  (Coreg, dilt for AFib and HTN)     Rehab efforts: The patient has been evaluated by a stroke team provider and the therapy needs have been fully considered based off the presenting complaints and exam findings. The following therapy evaluations are needed: PT/OT/SLP evaluations when the patient is able to participate    Diagnostics ordered/pending: CT scan of head without contrast to asses brain parenchyma  No changes 9/2/20 while on heparin drip therapeutic    VTE prophylaxis: Mechanical prophylaxis: Place SCDs and now heparin infusion     BP parameters: Infarct: No intervention, SBP <220        Cytotoxic cerebral edema  -Small area of cytotoxic cerebral edema identified when reviewing brain imaging in the territory of the R middle cerebral artery. There is no mass effect associated with it. We will continue to monitor the patients clinical exam for any worsening of symptoms which may indicate expansion of the stroke or the area of the edema resulting in the clinical change. The pattern is suggestive of cardioembolic etiology in the setting of  new onset Afib w/RVR.    -Recommend Neurosurgery consult        Epistaxis  -Patient had uncontrolled epistaxis that was attributed to NGT placement and AC.  -Lovenox --> heparin infusion 9/1/20   -ENT consulted; signed off 8/31; leave nasal packing in for 5 days per ENT note 8/31 --> removed 9/4  -Managed by [facility]    Atrial fibrillation with rapid ventricular response  -Stroke risk factor.  The patient was on full dose lovenox --> heparin infusion now given epistasix  -Continue Diltiazem gtt; Digoxin loaded 9/1/20  -CT Head 9/2 without acute chagnes on therapeutic heparin    Chronic obstructive pulmonary disease  -Goal SpO2>88%  -Managed by [facility]    GOYO (acute kidney injury)  -Cr 2.1<2.3 on 8/28 --> 1.5 on 9/7/20  -Managed by [facility]    Acute on chronic respiratory failure with hypoxia and hypercapnia  -Patient is currently intubated and sedated.   -Managed by [facility]       8/31: Patient intubated. Rhinorockets in place; ENT sign off.   9/1: Patient remains intubated; heparin restarted; once therapeutic, CT head and plan for extubation. Dilt for AFib RVR  9/2: Patient remains intubated  9/3: Patient remains intubated; alert on Precedex. Transitioned to po dilt for Afib.   9/4: Patient remains intubated; alert on Precedex, rhinorockets removed  9/7: no change, intubated and sedated on Propofol    STROKE DOCUMENTATION        NIH Scale:  1a. Level of Consciousness: 0-->Alert, keenly responsive  1b. LOC Questions: 0-->Answers both questions correctly  1c. LOC Commands: 0-->Performs both tasks correctly  2. Best Gaze: 0-->Normal  3. Visual: 0-->No visual loss  4. Facial Palsy: 0-->Normal symmetrical movements  5a. Motor Arm, Left: 4-->No movement  5b. Motor Arm, Right: 2-->Some effort against gravity, limb cannot get to or maintain (if cued) 90 (or 45) degrees, drifts down to bed, but has some effort against gravity  6a. Motor Leg, Left: 4-->No movement  6b. Motor Leg, Right: 2-->Some effort against gravity, leg falls to bed by 5  secs, but has some effort against gravity  7. Limb Ataxia: 2-->Present in two limbs  8. Sensory: 0-->Normal, no sensory loss  9. Best Language: 3-->Mute, global aphasia, no usable speech or auditory comprehension  10. Dysarthria: (UN) Intubated or other physical barrier  11. Extinction and Inattention (formerly Neglect): 0-->No abnormality  Total (NIH Stroke Scale): 17       Modified Jesus Score: 1  Manuel Coma Scale:8   ABCD2 Score:    [id number] Score:6  HAS -BLED Score:0  ICH Score:   Hunt & Bell Classification:      Hemorrhagic change of an Ischemic Stroke: Does this patient have an ischemic stroke with hemorrhagic changes? No     Neurologic Chief Complaint: R MCA 2/2 Afib.     Subjective:     Interval History: Patient is seen for follow-up neurological assessment and treatment recommendations: R MCA 2/2 Afib on heparin, coreg, and diltiazem. No changes, primary team continues to wean from vent.     HPI, Past Medical, Family, and Social History remains the same as documented in the initial encounter.     Review of Systems   Unable to perform ROS: Intubated   Constitutional: Negative for fever.   HENT: Negative for nosebleeds.         Tongue swelling   Respiratory: Negative for chest tightness.         Intubated   Cardiovascular: Negative for chest pain.   Gastrointestinal: Negative for anal bleeding and blood in stool.   Genitourinary: Negative for hematuria.   Psychiatric/Behavioral: Positive for agitation.        Precedex for agitation while on MV.      Scheduled Meds:   albuterol-ipratropium  3 mL Nebulization Q6H    atorvastatin  40 mg Per OG tube Daily    carvediloL  3.125 mg Per OG tube BID    diltiaZEM  90 mg Per OG tube Q8H    [START ON 9/8/2020] furosemide  40 mg Per OG tube BID    pantoprozole (PROTONIX) IV  40 mg Intravenous Q12H    polyethylene glycol  17 g Per NG tube Daily    QUEtiapine  50 mg Per OG tube BID    senna-docusate 8.6-50 mg  1 tablet Per OG tube BID    valproic acid (as  sodium salt)  250 mg Per OG tube Q8H     Continuous Infusions:   dexmedetomidine (PRECEDEX) infusion Stopped (09/07/20 1330)    dextrose 10 % in water (D10W)      heparin (porcine) in D5W 10 Units/kg/hr (09/07/20 1505)    propofoL Stopped (09/07/20 1115)     PRN Meds:acetaminophen, dextrose 10 % in water (D10W), dextrose 50%, fentaNYL, glucagon (human recombinant), hydrALAZINE, magnesium oxide, magnesium oxide, ondansetron, potassium chloride, potassium chloride, potassium chloride, potassium, sodium phosphates, potassium, sodium phosphates, potassium, sodium phosphates    Objective:     Vital Signs (Most Recent):  Temp: 99 °F (37.2 °C) (09/07/20 1505)  Pulse: 89 (09/07/20 1529)  Resp: 16 (09/07/20 1505)  BP: (!) 101/52 (09/07/20 1505)  SpO2: 96 % (09/07/20 1529)  BP Location: Right arm    Vital Signs Range (Last 24H):  Temp:  [97.9 °F (36.6 °C)-99.7 °F (37.6 °C)]   Pulse:  []   Resp:  [14-41]   BP: (101-181)/(52-90)   SpO2:  [96 %-100 %]   Arterial Line BP: (100-172)/()   BP Location: Right arm    Physical Exam  Constitutional:       General: She is not in acute distress.     Appearance: Normal appearance. She is not ill-appearing.   HENT:      Head: Normocephalic.      Right Ear: External ear normal. There is no impacted cerumen.      Left Ear: External ear normal. There is no impacted cerumen.      Nose: No congestion or rhinorrhea.      Mouth/Throat:      Mouth: Mucous membranes are dry.   Eyes:      General: No scleral icterus.        Right eye: No discharge.         Left eye: No discharge.      Comments: Left pupil 3mm, R pupil 6 fixed    Cardiovascular:      Rate and Rhythm: Normal rate.      Heart sounds: No murmur.   Pulmonary:      Comments: intubated  Abdominal:      General: Abdomen is flat. There is no distension.      Palpations: Abdomen is soft.      Tenderness: There is no abdominal tenderness.   Musculoskeletal:         General: No swelling, tenderness or deformity.   Skin:      General: Skin is warm and dry.      Coloration: Skin is not pale.      Findings: No erythema.   Neurological:      Mental Status: She is alert.      Comments: Limited 2/2/ sedation with Precedex         Neurological Exam:   LOC: alert and obtunded  Motor: Arm left  Plegia 0/5  Leg left  Plegia 0/5  Arm right  Paresis: 3/5  Leg right Paresis: 4/5    Laboratory:  CMP:   Recent Labs   Lab 09/07/20  0059   CALCIUM 8.9   ALBUMIN 2.1*   PROT 5.9*      K 4.3   CO2 33*   CL 99   BUN 39*   CREATININE 1.5*   ALKPHOS 72   ALT 32   AST 45*   BILITOT 0.3     CBC:   Recent Labs   Lab 09/07/20  0059   WBC 16.25*   RBC 3.60*   HGB 10.9*   HCT 34.2*   *   MCV 95   MCH 30.3   MCHC 31.9*     Lipid Panel: No results for input(s): CHOL, LDLCALC, HDL, TRIG in the last 168 hours.  Hgb A1C: No results for input(s): HGBA1C in the last 168 hours.  TSH: No results for input(s): TSH in the last 168 hours.    Diagnostic Results   MRI Brain 8/26/20  Ventricles and sulci are normal in size for age without evidence of hydrocephalus.  No extra-axial blood or fluid collections  Large area restricted diffusion right posterior frontal and parietal lobe consistent with right recent infarction.  No evidence of superimposed hemorrhage.  Patchy areas of increased T2/FLAIR signal of the supratentorial white matter consistent with chronic microvascular ischemic changes.  No evidence of mass.  Skull/extracranial contents (limited evaluation): Bone marrow signal intensity is normal.  Small amount of fluid in the right maxillary sinus.  Large area of restricted diffusion consistent with continued evolution of recent infarction involving the right posterior frontal and parietal lobes lobes.  No evidence of hemorrhage.  Chronic microvascular ischemic changes.     CT head 8/30/20  No extra-axial blood or fluid collections.  Continued evolution of areas infarction involving the right frontal and right parietal lobes.  No evidence of superimposed  hemorrhage.  Associated edema with mild compression of the right lateral ventricle.  No hydrocephalus.  No evidence of mass.  Skull/extracranial contents (limited evaluation): No fracture. Mastoid air cells and paranasal sinuses are essentially clear.     Continued evolution of right frontal and parietal lobe infarctions with no evidence of hemorrhage.  Associated edema is noted with no midline shift.  Mild mass effect on the right lateral ventricle.  No hydrocephalus.     Cardiac Imaging   TTE 9/1/20  · Moderate concentric left ventricular hypertrophy.  · Moderately to severely decreased left ventricular systolic function. The estimated ejection fraction is 25-30%.  · Left ventricular diastolic dysfunction.  · Moderately to severely reduced right ventricular systolic function.  · Moderate left atrial enlargement.  · Severe right atrial enlargement.  · Moderate mitral regurgitation.  · Moderate tricuspid regurgitation.  · Intermediate central venous pressure (8 mmHg).  · The estimated PA systolic pressure is 42 mmHg.  · Pulmonary hypertension present.  · Negative bubble study.      Teresa Marin MD  Comprehensive Stroke Center  Department of Vascular Neurology   [facility]       plan MAC with brachial plexus block, R/B/A diuscusssed including, bleeding, infection, nerve injury, pneumothorax, horners syndrome, failed, incomplete, prolonged block  all Qs answered  Pt understands and agrees to proceed as planned

## 2025-06-18 NOTE — ASU DISCHARGE PLAN (ADULT/PEDIATRIC) - FINANCIAL ASSISTANCE
[facility] provides services at a reduced cost to those who are determined to be eligible through [facility]’s financial assistance program. Information regarding [facility]’s financial assistance program can be found by going to [email or web address] or by calling [phone].

## 2025-06-24 DIAGNOSIS — N18.6 END STAGE RENAL DISEASE: ICD-10-CM

## 2025-07-01 ENCOUNTER — EMERGENCY (EMERGENCY)
Facility: HOSPITAL | Age: 70
LOS: 0 days | Discharge: HOME | End: 2025-07-02
Attending: EMERGENCY MEDICINE
Payer: MEDICARE

## 2025-07-01 VITALS
OXYGEN SATURATION: 96 % | HEART RATE: 88 BPM | SYSTOLIC BLOOD PRESSURE: 127 MMHG | TEMPERATURE: 99 F | HEIGHT: 62 IN | DIASTOLIC BLOOD PRESSURE: 69 MMHG | RESPIRATION RATE: 17 BRPM

## 2025-07-01 DIAGNOSIS — Z95.5 PRESENCE OF CORONARY ANGIOPLASTY IMPLANT AND GRAFT: Chronic | ICD-10-CM

## 2025-07-01 DIAGNOSIS — Z98.891 HISTORY OF UTERINE SCAR FROM PREVIOUS SURGERY: Chronic | ICD-10-CM

## 2025-07-01 DIAGNOSIS — Z98.890 OTHER SPECIFIED POSTPROCEDURAL STATES: Chronic | ICD-10-CM

## 2025-07-01 LAB
ALBUMIN SERPL ELPH-MCNC: 4.4 G/DL — SIGNIFICANT CHANGE UP (ref 3.5–5.2)
ALP SERPL-CCNC: 68 U/L — SIGNIFICANT CHANGE UP (ref 30–115)
ALT FLD-CCNC: 14 U/L — SIGNIFICANT CHANGE UP (ref 0–41)
ANION GAP SERPL CALC-SCNC: 16 MMOL/L — HIGH (ref 7–14)
AST SERPL-CCNC: 17 U/L — SIGNIFICANT CHANGE UP (ref 0–41)
BASOPHILS # BLD AUTO: 0.06 K/UL — SIGNIFICANT CHANGE UP (ref 0–0.2)
BASOPHILS NFR BLD AUTO: 0.5 % — SIGNIFICANT CHANGE UP (ref 0–1)
BILIRUB SERPL-MCNC: 0.3 MG/DL — SIGNIFICANT CHANGE UP (ref 0.2–1.2)
BUN SERPL-MCNC: 16 MG/DL — SIGNIFICANT CHANGE UP (ref 10–20)
CALCIUM SERPL-MCNC: 9.2 MG/DL — SIGNIFICANT CHANGE UP (ref 8.4–10.5)
CHLORIDE SERPL-SCNC: 91 MMOL/L — LOW (ref 98–110)
CO2 SERPL-SCNC: 28 MMOL/L — SIGNIFICANT CHANGE UP (ref 17–32)
CREAT SERPL-MCNC: 3.4 MG/DL — HIGH (ref 0.7–1.5)
EGFR: 14 ML/MIN/1.73M2 — LOW
EGFR: 14 ML/MIN/1.73M2 — LOW
EOSINOPHIL # BLD AUTO: 0.13 K/UL — SIGNIFICANT CHANGE UP (ref 0–0.7)
EOSINOPHIL NFR BLD AUTO: 1.1 % — SIGNIFICANT CHANGE UP (ref 0–8)
GLUCOSE SERPL-MCNC: 165 MG/DL — HIGH (ref 70–99)
HCT VFR BLD CALC: 37.5 % — SIGNIFICANT CHANGE UP (ref 37–47)
HGB BLD-MCNC: 12.2 G/DL — SIGNIFICANT CHANGE UP (ref 12–16)
IMM GRANULOCYTES NFR BLD AUTO: 0.7 % — HIGH (ref 0.1–0.3)
LACTATE SERPL-SCNC: 2.2 MMOL/L — HIGH (ref 0.7–2)
LYMPHOCYTES # BLD AUTO: 1.23 K/UL — SIGNIFICANT CHANGE UP (ref 1.2–3.4)
LYMPHOCYTES # BLD AUTO: 10 % — LOW (ref 20.5–51.1)
MAGNESIUM SERPL-MCNC: 2.1 MG/DL — SIGNIFICANT CHANGE UP (ref 1.8–2.4)
MCHC RBC-ENTMCNC: 29.8 PG — SIGNIFICANT CHANGE UP (ref 27–31)
MCHC RBC-ENTMCNC: 32.5 G/DL — SIGNIFICANT CHANGE UP (ref 32–37)
MCV RBC AUTO: 91.5 FL — SIGNIFICANT CHANGE UP (ref 81–99)
MONOCYTES # BLD AUTO: 0.76 K/UL — HIGH (ref 0.1–0.6)
MONOCYTES NFR BLD AUTO: 6.2 % — SIGNIFICANT CHANGE UP (ref 1.7–9.3)
NEUTROPHILS # BLD AUTO: 10.02 K/UL — HIGH (ref 1.4–6.5)
NEUTROPHILS NFR BLD AUTO: 81.5 % — HIGH (ref 42.2–75.2)
NRBC BLD AUTO-RTO: 0 /100 WBCS — SIGNIFICANT CHANGE UP (ref 0–0)
PLATELET # BLD AUTO: 194 K/UL — SIGNIFICANT CHANGE UP (ref 130–400)
PMV BLD: 11.1 FL — HIGH (ref 7.4–10.4)
POTASSIUM SERPL-MCNC: 4 MMOL/L — SIGNIFICANT CHANGE UP (ref 3.5–5)
POTASSIUM SERPL-SCNC: 4 MMOL/L — SIGNIFICANT CHANGE UP (ref 3.5–5)
PROT SERPL-MCNC: 7.7 G/DL — SIGNIFICANT CHANGE UP (ref 6–8)
RBC # BLD: 4.1 M/UL — LOW (ref 4.2–5.4)
RBC # FLD: 14.6 % — HIGH (ref 11.5–14.5)
SODIUM SERPL-SCNC: 135 MMOL/L — SIGNIFICANT CHANGE UP (ref 135–146)
TROPONIN T, HIGH SENSITIVITY RESULT: 72 NG/L — CRITICAL HIGH (ref 6–13)
WBC # BLD: 12.29 K/UL — HIGH (ref 4.8–10.8)
WBC # FLD AUTO: 12.29 K/UL — HIGH (ref 4.8–10.8)

## 2025-07-01 PROCEDURE — 71045 X-RAY EXAM CHEST 1 VIEW: CPT

## 2025-07-01 PROCEDURE — 83605 ASSAY OF LACTIC ACID: CPT

## 2025-07-01 PROCEDURE — 84484 ASSAY OF TROPONIN QUANT: CPT

## 2025-07-01 PROCEDURE — 70450 CT HEAD/BRAIN W/O DYE: CPT | Mod: 26

## 2025-07-01 PROCEDURE — 83735 ASSAY OF MAGNESIUM: CPT

## 2025-07-01 PROCEDURE — 82962 GLUCOSE BLOOD TEST: CPT

## 2025-07-01 PROCEDURE — 99285 EMERGENCY DEPT VISIT HI MDM: CPT

## 2025-07-01 PROCEDURE — 71045 X-RAY EXAM CHEST 1 VIEW: CPT | Mod: 26

## 2025-07-01 PROCEDURE — 85025 COMPLETE CBC W/AUTO DIFF WBC: CPT

## 2025-07-01 PROCEDURE — 93005 ELECTROCARDIOGRAM TRACING: CPT

## 2025-07-01 PROCEDURE — 99285 EMERGENCY DEPT VISIT HI MDM: CPT | Mod: 25

## 2025-07-01 PROCEDURE — 36415 COLL VENOUS BLD VENIPUNCTURE: CPT

## 2025-07-01 PROCEDURE — 80053 COMPREHEN METABOLIC PANEL: CPT

## 2025-07-01 PROCEDURE — 93010 ELECTROCARDIOGRAM REPORT: CPT

## 2025-07-01 PROCEDURE — 70450 CT HEAD/BRAIN W/O DYE: CPT

## 2025-07-01 NOTE — ED PROVIDER NOTE - PROGRESS NOTE DETAILS
cb: pt is doing well throughout ED visit - unremarkable workup, pt is feeling well. discussed results, pt safe to be discharged, she is amenable to plan. Return precautions discussed. Understanding verbalized

## 2025-07-01 NOTE — ED PROVIDER NOTE - OBJECTIVE STATEMENT
69-year-old female with past medical history of ESRD on dialysis, CAD, DM on insulin, recent AV fistula presenting for reported seizure-like activity.  Patient states she completed a 3-hour dialysis session and was in an [employer] on her way home when she seized, as reported by the .  Unknown duration of seizure. Next thing patient remembers is being in [facility] ED.  States she has never had a seizure in the past.  Denies use of alcohol, illicit drugs, new medications.  No acute symptoms at this time.

## 2025-07-01 NOTE — ED PROVIDER NOTE - ATTENDING CONTRIBUTION TO CARE
Patient with history of hypertension dyslipidemia diabetes ESRD CAD recent AV fistula of the right created on June 18 who is presenting for loss of consciousness while she was in excessive ride after she completed 3-1/2 hours of dialysis.  She reports that she members getting the access ride and afterwards remembers waking up in the hospital.  Unclear if there versus seizure activity per patient but from the access.   reports that there was seizure activity.  The patient denies any defecation or urination.  The oropharynx without any laceration or trauma.  Neurologic exam shows normal range of motion of upper and lower extremities.  Oropharynx normal lungs clear head is atraumatic heart sounds are normal.  Abdomen is soft.  There is right chest catheter.  Plan is to obtain labs CT head EKG.

## 2025-07-01 NOTE — ED PROVIDER NOTE - NSPTACCESSSVCSAPPT_ED_ALL_ED
Specialty Care (immediate)... Cimzia Pregnancy And Lactation Text: This medication crosses the placenta but can be considered safe in certain situations. Cimzia may be excreted in breast milk.

## 2025-07-01 NOTE — ED ADULT TRIAGE NOTE - CHIEF COMPLAINT QUOTE
Patient was picked up  by [employer] [place] from this address, [place] had what the  described as a seizure like activity. She is post-ictal, all she could remember initially was her name and birthday - EMS  Patient alert and orientated x 2, speech clear, does not remember why she was in SI today, history of recent AV fistula on HD

## 2025-07-01 NOTE — ED PROVIDER NOTE - PATIENT PORTAL LINK FT
You can access the FollowMyHealth Patient Portal offered by [facility] by registering at the following website: [email or web address]. By joining [facility]’s FollowMyHealth portal, you will also be able to view your health information using other applications (apps) compatible with our system.

## 2025-07-01 NOTE — ED PROVIDER NOTE - PHYSICAL EXAMINATION
Physical Exam: VS reviewed.   Constitutional: Patient appears non toxic, mildly confused but pleasant  CARD: S1S2 RRR  RESP: Normal work of breathing, no tachypnea, no retractions or distress. Lungs CTAB  ABD: Soft, NT/ND, no guarding.   MSK: Moving all extremities well.  Neuro: Awake, alert, oriented. Answering questions appropriately. No focal deficits.   Psych: Cooperative, appropriate

## 2025-07-01 NOTE — ED PROVIDER NOTE - EKG/XRAY ADDITIONAL INFORMATION
independent interpretation of the ekg performed by Dr. Ronald Cross shows independent interpretation of the ekg performed by Dr. Ronald Cross shows Normal sinus rhythm heart rate 86 parable 142 QRS 94 QTc 452 no elevations or depressions

## 2025-07-01 NOTE — ED PROVIDER NOTE - CLINICAL SUMMARY MEDICAL DECISION MAKING FREE TEXT BOX
Received sign out from Dr. Cross -- patient presented to ED with episode of decreased MS possible seizure activity en route to home from dialysis. No post ictal symptoms, currently at baseline. Labs and imaging unremarkable, CT head without acute findings.    Patient had similar episode 2 months ago, had normal echo, EEG.    Given previous episode, unremarkable work up at that time and in ED today, patient at baseline without recurrence of symptoms, will dc with close monitoring of sx, follow up and return precautions.

## 2025-07-01 NOTE — ED PROVIDER NOTE - NSFOLLOWUPINSTRUCTIONS_ED_ALL_ED_FT
Follow up with your private doctors  Follow up with cardiology and neurology    Our Emergency Department Referral Coordinators will be reaching out to you in the next 24-48 hours from 9:00am to 5:00pm to schedule a follow up appointment. Please expect a phone call from the hospital in that time frame. If you do not receive a call or if you have any questions or concerns, you can reach them at   [phone].    Syncope    Syncope is when you temporarily lose consciousness, also called fainting or passing out. It is caused by a sudden decrease in blood flow to the brain. Even though most causes of syncope are not dangerous, syncope can possibly be a sign of a serious medical problem. Signs that you may be about to faint include feeling dizzy, lightheaded, nausea, visual changes, or cold/clammy skin. Do not drive, operate heavy machinery, or play sports until your health care provider says it is okay.    SEEK IMMEDIATE MEDICAL CARE IF YOU HAVE ANY OF THE FOLLOWING SYMPTOMS: severe headache, pain in your chest/abdomen/back, bleeding from your mouth or rectum, palpitations, shortness of breath, pain with breathing, seizure, confusion, or trouble walking.

## 2025-07-02 VITALS
DIASTOLIC BLOOD PRESSURE: 68 MMHG | RESPIRATION RATE: 18 BRPM | SYSTOLIC BLOOD PRESSURE: 104 MMHG | HEART RATE: 68 BPM | TEMPERATURE: 98 F | OXYGEN SATURATION: 97 %

## 2025-07-02 LAB — TROPONIN T, HIGH SENSITIVITY RESULT: 68 NG/L — CRITICAL HIGH (ref 6–13)

## 2025-07-02 NOTE — ED ADULT NURSE REASSESSMENT NOTE - NS ED NURSE REASSESS COMMENT FT1
pt received from previous RN at 7am. pt reassessed. pt a&ox4. no ss of acute distress. pt is dc and waiting for transport. safety and cardiac monitoring maintained.

## 2025-07-02 NOTE — ED ADULT NURSE NOTE - CHIEF COMPLAINT QUOTE
Patient was picked up  by [employer] ride from this address, [place] [facility] [place] she had what the  described as a seizure like activity. She is post-ictal, all she could remember initially was her name and birthday - EMS  Patient alert and orientated x 2, speech clear, does not remember why she was in SI today, history of recent AV fistula on HD

## 2025-07-03 DIAGNOSIS — I25.10 ATHEROSCLEROTIC HEART DISEASE OF NATIVE CORONARY ARTERY WITHOUT ANGINA PECTORIS: ICD-10-CM

## 2025-07-03 DIAGNOSIS — I12.0 HYPERTENSIVE CHRONIC KIDNEY DISEASE WITH STAGE 5 CHRONIC KIDNEY DISEASE OR END STAGE RENAL DISEASE: ICD-10-CM

## 2025-07-03 DIAGNOSIS — E78.5 HYPERLIPIDEMIA, UNSPECIFIED: ICD-10-CM

## 2025-07-03 DIAGNOSIS — Z79.4 LONG TERM (CURRENT) USE OF INSULIN: ICD-10-CM

## 2025-07-03 DIAGNOSIS — E11.22 TYPE 2 DIABETES MELLITUS WITH DIABETIC CHRONIC KIDNEY DISEASE: ICD-10-CM

## 2025-07-03 DIAGNOSIS — Z99.2 DEPENDENCE ON RENAL DIALYSIS: ICD-10-CM

## 2025-07-03 DIAGNOSIS — N18.6 END STAGE RENAL DISEASE: ICD-10-CM

## 2025-07-03 DIAGNOSIS — Z98.890 OTHER SPECIFIED POSTPROCEDURAL STATES: ICD-10-CM

## 2025-07-07 NOTE — CHART NOTE - NSCHARTNOTEFT_GEN_A_CORE
phone connects but no one responds 7/2- AC / pt prefers afternoons on Monday, Wednesday, or Friday 7/3 - DK/ scheduled on 8/13/25 @ 3:40p w/ Dr. Loernzo @ [facility]- 7/7- AC

## 2025-07-23 ENCOUNTER — OUTPATIENT (OUTPATIENT)
Dept: OUTPATIENT SERVICES | Facility: HOSPITAL | Age: 70
LOS: 1 days | Discharge: HOME | End: 2025-07-23
Payer: MEDICARE

## 2025-07-23 ENCOUNTER — TRANSCRIPTION ENCOUNTER (OUTPATIENT)
Age: 70
End: 2025-07-23

## 2025-07-23 VITALS
DIASTOLIC BLOOD PRESSURE: 60 MMHG | RESPIRATION RATE: 20 BRPM | TEMPERATURE: 98 F | HEART RATE: 72 BPM | SYSTOLIC BLOOD PRESSURE: 126 MMHG | OXYGEN SATURATION: 98 % | WEIGHT: 207.23 LBS | HEIGHT: 62 IN

## 2025-07-23 VITALS
OXYGEN SATURATION: 98 % | SYSTOLIC BLOOD PRESSURE: 141 MMHG | RESPIRATION RATE: 20 BRPM | DIASTOLIC BLOOD PRESSURE: 67 MMHG | HEART RATE: 71 BPM | TEMPERATURE: 97 F

## 2025-07-23 DIAGNOSIS — Z98.890 OTHER SPECIFIED POSTPROCEDURAL STATES: Chronic | ICD-10-CM

## 2025-07-23 DIAGNOSIS — N18.9 CHRONIC KIDNEY DISEASE, UNSPECIFIED: ICD-10-CM

## 2025-07-23 DIAGNOSIS — Z98.891 HISTORY OF UTERINE SCAR FROM PREVIOUS SURGERY: Chronic | ICD-10-CM

## 2025-07-23 DIAGNOSIS — Z95.5 PRESENCE OF CORONARY ANGIOPLASTY IMPLANT AND GRAFT: Chronic | ICD-10-CM

## 2025-07-23 LAB
BASE EXCESS BLDV CALC-SCNC: -2.2 MMOL/L — LOW (ref -2–3)
CA-I SERPL-SCNC: 1.18 MMOL/L — SIGNIFICANT CHANGE UP (ref 1.15–1.33)
GAS PNL BLDV: 135 MMOL/L — LOW (ref 136–145)
GAS PNL BLDV: SIGNIFICANT CHANGE UP
GLUCOSE BLDC GLUCOMTR-MCNC: 126 MG/DL — HIGH (ref 70–99)
HCO3 BLDV-SCNC: 22 MMOL/L — SIGNIFICANT CHANGE UP (ref 22–29)
HCT VFR BLDA CALC: 38 % — SIGNIFICANT CHANGE UP (ref 34.5–46.5)
HGB BLD CALC-MCNC: 12.5 G/DL — SIGNIFICANT CHANGE UP (ref 11.7–16.1)
LACTATE BLDV-MCNC: 1.8 MMOL/L — SIGNIFICANT CHANGE UP (ref 0.5–2)
PCO2 BLDV: 37 MMHG — LOW (ref 39–42)
PH BLDV: 7.39 — SIGNIFICANT CHANGE UP (ref 7.32–7.43)
PO2 BLDV: 59 MMHG — HIGH (ref 25–45)
POTASSIUM BLDV-SCNC: 5.7 MMOL/L — HIGH (ref 3.5–5.1)
SAO2 % BLDV: 89.1 % — HIGH (ref 67–88)

## 2025-07-23 PROCEDURE — 85018 HEMOGLOBIN: CPT

## 2025-07-23 PROCEDURE — 36902 INTRO CATH DIALYSIS CIRCUIT: CPT

## 2025-07-23 PROCEDURE — C1600: CPT

## 2025-07-23 PROCEDURE — C1769: CPT

## 2025-07-23 PROCEDURE — C1894: CPT

## 2025-07-23 PROCEDURE — 83605 ASSAY OF LACTIC ACID: CPT

## 2025-07-23 PROCEDURE — 82330 ASSAY OF CALCIUM: CPT

## 2025-07-23 PROCEDURE — C1725: CPT

## 2025-07-23 PROCEDURE — 76937 US GUIDE VASCULAR ACCESS: CPT

## 2025-07-23 PROCEDURE — 84295 ASSAY OF SERUM SODIUM: CPT

## 2025-07-23 PROCEDURE — 82803 BLOOD GASES ANY COMBINATION: CPT

## 2025-07-23 PROCEDURE — 82962 GLUCOSE BLOOD TEST: CPT

## 2025-07-23 PROCEDURE — 84132 ASSAY OF SERUM POTASSIUM: CPT

## 2025-07-23 PROCEDURE — 76937 US GUIDE VASCULAR ACCESS: CPT | Mod: 26

## 2025-07-23 PROCEDURE — 85014 HEMATOCRIT: CPT

## 2025-07-23 NOTE — ASU DISCHARGE PLAN (ADULT/PEDIATRIC) - PROCEDURE
Right upper extremity AV fistulogram with scoring and balloon angioplasty of the arterial anastamosis

## 2025-07-23 NOTE — ASU PATIENT PROFILE, ADULT - AS SC BRADEN ACTIVITY
68M pmh htn, hld 68M pmh htn, hld, borderline DM c/o right knee pain after work injury on 4- here for pst for scheduled Right knee arthroplasty wit Dr. Boggs on 2- (4) walks frequently

## 2025-07-23 NOTE — PROCEDURE NOTE - GENERAL PROCEDURE NAME
Right upper extremity AV fistulogram with balloon angioplasty and flex scoring of the arterial anastamosis

## 2025-07-23 NOTE — H&P ADULT - HISTORY OF PRESENT ILLNESS
Patient is 68 yo F with hx of HTN, HLD, DM, ESRD on HD (Tues/Thurs/Sat), CAD s/p PCI x1 who presents to IR for an image guided right fistulogram

## 2025-07-23 NOTE — ASU DISCHARGE PLAN (ADULT/PEDIATRIC) - FINANCIAL ASSISTANCE
[facility] provides services at a reduced cost to those who are determined to be eligible through [facility]’s financial assistance program. Information regarding [facility]’s financial assistance program can be found by going to [email or web address] or by calling [phone].

## 2025-07-23 NOTE — PRE-OP CHECKLIST - PATIENT'S PERSONAL PROPERTY GIVEN TO
family member Picato Counseling:  I discussed with the patient the risks of Picato including but not limited to erythema, scaling, itching, weeping, crusting, and pain.

## 2025-07-23 NOTE — ASU DISCHARGE PLAN (ADULT/PEDIATRIC) - NS MD DC FALL RISK RISK
For information on Fall & Injury Prevention, visit: [email or web address] OR  [email or web address] OR  https://www.cdc.gov/steadi/patient.html

## 2025-07-23 NOTE — ASU PATIENT PROFILE, ADULT - FALL HARM RISK - UNIVERSAL INTERVENTIONS
Bed in lowest position, wheels locked, appropriate side rails in place/Call bell, personal items and telephone in reach/Instruct patient to call for assistance before getting out of bed or chair/Non-slip footwear when patient is out of bed/[place] to call system/Physically safe environment - no spills, clutter or unnecessary equipment/Purposeful Proactive Rounding/Room/bathroom lighting operational, light cord in reach

## 2025-07-23 NOTE — ASU PATIENT PROFILE, ADULT - PATIENT'S PREFERRED PRONOUN
Regional Block    Date/Time: 5/29/2024 8:53 AM    Performed by: Brett Abad MD  Authorized by: Brett Abad MD      General Information and Staff    Start Time:  5/29/2024 8:53 AM  End Time:  5/29/2024 8:59 AM  Anesthesiologist:  Brett Abad MD  Performed by:  Anesthesiologist  Patient Location:  OR    Block Placement: Post Induction  Site Identification: real time ultrasound guided and image stored and retrievable    Block site/laterality marked before start: site marked  Reason for Block: at surgeon's request and post-op pain management    Preanesthetic Checklist: 2 patient identifers, IV checked, site marked, risks and benefits discussed, monitors and equipment checked, pre-op evaluation, timeout performed, anesthesia consent, sterile technique used, no prohibitive neurological deficits and no local skin infection at insertion site      Procedure Details    Patient Position:  Supine  Prep: ChloraPrep    Monitoring:  Cardiac monitor, continuous pulse ox and blood pressure cuff  Anesthesia block type: Serratus Anterior.  Laterality:  Bilateral  Injection Technique:  Single-shot    Needle    Needle Type:  Short-bevel and echogenic  Needle Gauge:  21 G  Needle Localization:  Ultrasound guidance  Reason for Ultrasound Use: appropriate spread of the medication was noted in real time and no ultrasound evidence of intravascular and/or intraneural injection            Assessment    Injection Assessment:  Good spread noted, negative resistance, negative aspiration for heme, incremental injection and low pressure  Heart Rate Change: No    - Patient tolerated block procedure well without evidence of immediate block related complications.     Medications  5/29/2024 8:53 AM      Additional Comments    Medication:  Bupivacaine 0.25% 30mL + Decadron PF 2mg (each side)         Her/She

## 2025-08-13 ENCOUNTER — APPOINTMENT (OUTPATIENT)
Dept: CARDIOLOGY | Facility: CLINIC | Age: 70
End: 2025-08-13

## 2025-08-14 ENCOUNTER — APPOINTMENT (OUTPATIENT)
Dept: INTERVENTIONAL RADIOLOGY/VASCULAR | Facility: CLINIC | Age: 70
End: 2025-08-14
Payer: MEDICARE

## 2025-08-14 VITALS
TEMPERATURE: 97.4 F | OXYGEN SATURATION: 100 % | DIASTOLIC BLOOD PRESSURE: 78 MMHG | HEART RATE: 77 BPM | SYSTOLIC BLOOD PRESSURE: 151 MMHG

## 2025-08-14 PROCEDURE — 99212 OFFICE O/P EST SF 10 MIN: CPT

## 2025-09-02 ENCOUNTER — INPATIENT (INPATIENT)
Facility: HOSPITAL | Age: 70
LOS: 2 days | Discharge: HOME | DRG: 280 | End: 2025-09-05
Attending: INTERNAL MEDICINE | Admitting: STUDENT IN AN ORGANIZED HEALTH CARE EDUCATION/TRAINING PROGRAM
Payer: MEDICARE

## 2025-09-02 VITALS
HEART RATE: 88 BPM | OXYGEN SATURATION: 99 % | HEIGHT: 62 IN | TEMPERATURE: 98 F | DIASTOLIC BLOOD PRESSURE: 78 MMHG | RESPIRATION RATE: 16 BRPM | SYSTOLIC BLOOD PRESSURE: 160 MMHG

## 2025-09-02 DIAGNOSIS — I25.10 ATHEROSCLEROTIC HEART DISEASE OF NATIVE CORONARY ARTERY WITHOUT ANGINA PECTORIS: ICD-10-CM

## 2025-09-02 DIAGNOSIS — I95.3 HYPOTENSION OF HEMODIALYSIS: ICD-10-CM

## 2025-09-02 DIAGNOSIS — I12.0 HYPERTENSIVE CHRONIC KIDNEY DISEASE WITH STAGE 5 CHRONIC KIDNEY DISEASE OR END STAGE RENAL DISEASE: ICD-10-CM

## 2025-09-02 DIAGNOSIS — Z95.5 PRESENCE OF CORONARY ANGIOPLASTY IMPLANT AND GRAFT: ICD-10-CM

## 2025-09-02 DIAGNOSIS — I21.4 NON-ST ELEVATION (NSTEMI) MYOCARDIAL INFARCTION: ICD-10-CM

## 2025-09-02 DIAGNOSIS — Z98.890 OTHER SPECIFIED POSTPROCEDURAL STATES: Chronic | ICD-10-CM

## 2025-09-02 DIAGNOSIS — N18.6 END STAGE RENAL DISEASE: ICD-10-CM

## 2025-09-02 DIAGNOSIS — Z98.891 HISTORY OF UTERINE SCAR FROM PREVIOUS SURGERY: Chronic | ICD-10-CM

## 2025-09-02 DIAGNOSIS — Z99.2 DEPENDENCE ON RENAL DIALYSIS: ICD-10-CM

## 2025-09-02 DIAGNOSIS — Z95.5 PRESENCE OF CORONARY ANGIOPLASTY IMPLANT AND GRAFT: Chronic | ICD-10-CM

## 2025-09-02 DIAGNOSIS — F09 UNSPECIFIED MENTAL DISORDER DUE TO KNOWN PHYSIOLOGICAL CONDITION: ICD-10-CM

## 2025-09-02 DIAGNOSIS — E11.22 TYPE 2 DIABETES MELLITUS WITH DIABETIC CHRONIC KIDNEY DISEASE: ICD-10-CM

## 2025-09-02 DIAGNOSIS — E78.5 HYPERLIPIDEMIA, UNSPECIFIED: ICD-10-CM

## 2025-09-02 LAB — GAS PNL BLDV: SIGNIFICANT CHANGE UP

## 2025-09-02 PROCEDURE — 71045 X-RAY EXAM CHEST 1 VIEW: CPT | Mod: 26

## 2025-09-02 PROCEDURE — 99285 EMERGENCY DEPT VISIT HI MDM: CPT

## 2025-09-02 PROCEDURE — 93010 ELECTROCARDIOGRAM REPORT: CPT

## 2025-09-02 PROCEDURE — 70450 CT HEAD/BRAIN W/O DYE: CPT | Mod: 26

## 2025-09-03 DIAGNOSIS — R41.82 ALTERED MENTAL STATUS, UNSPECIFIED: ICD-10-CM

## 2025-09-03 LAB
ALBUMIN SERPL ELPH-MCNC: 3.8 G/DL — SIGNIFICANT CHANGE UP (ref 3.5–5.2)
ALP SERPL-CCNC: 60 U/L — SIGNIFICANT CHANGE UP (ref 30–115)
ALT FLD-CCNC: 13 U/L — SIGNIFICANT CHANGE UP (ref 0–41)
ANION GAP SERPL CALC-SCNC: 12 MMOL/L — SIGNIFICANT CHANGE UP (ref 7–14)
APPEARANCE UR: CLEAR — SIGNIFICANT CHANGE UP
AST SERPL-CCNC: 13 U/L — SIGNIFICANT CHANGE UP (ref 0–41)
BASOPHILS # BLD AUTO: 0.06 K/UL — SIGNIFICANT CHANGE UP (ref 0–0.2)
BASOPHILS NFR BLD AUTO: 0.6 % — SIGNIFICANT CHANGE UP (ref 0–1)
BILIRUB SERPL-MCNC: 0.3 MG/DL — SIGNIFICANT CHANGE UP (ref 0.2–1.2)
BILIRUB UR-MCNC: NEGATIVE — SIGNIFICANT CHANGE UP
BUN SERPL-MCNC: 22 MG/DL — HIGH (ref 10–20)
CALCIUM SERPL-MCNC: 8.3 MG/DL — LOW (ref 8.4–10.5)
CHLORIDE SERPL-SCNC: 96 MMOL/L — LOW (ref 98–110)
CO2 SERPL-SCNC: 28 MMOL/L — SIGNIFICANT CHANGE UP (ref 17–32)
COLOR SPEC: YELLOW — SIGNIFICANT CHANGE UP
CREAT SERPL-MCNC: 3.6 MG/DL — HIGH (ref 0.7–1.5)
DIFF PNL FLD: ABNORMAL
EGFR: 13 ML/MIN/1.73M2 — LOW
EGFR: 13 ML/MIN/1.73M2 — LOW
EOSINOPHIL # BLD AUTO: 0.15 K/UL — SIGNIFICANT CHANGE UP (ref 0–0.7)
EOSINOPHIL NFR BLD AUTO: 1.4 % — SIGNIFICANT CHANGE UP (ref 0–8)
GLUCOSE BLDC GLUCOMTR-MCNC: 134 MG/DL — HIGH (ref 70–99)
GLUCOSE BLDC GLUCOMTR-MCNC: 144 MG/DL — HIGH (ref 70–99)
GLUCOSE BLDC GLUCOMTR-MCNC: 179 MG/DL — HIGH (ref 70–99)
GLUCOSE BLDC GLUCOMTR-MCNC: 96 MG/DL — SIGNIFICANT CHANGE UP (ref 70–99)
GLUCOSE SERPL-MCNC: 152 MG/DL — HIGH (ref 70–99)
GLUCOSE UR QL: 100 MG/DL
HCT VFR BLD CALC: 29.3 % — LOW (ref 37–47)
HGB BLD-MCNC: 9.8 G/DL — LOW (ref 12–16)
IMM GRANULOCYTES NFR BLD AUTO: 0.4 % — HIGH (ref 0.1–0.3)
KETONES UR QL: NEGATIVE MG/DL — SIGNIFICANT CHANGE UP
LEUKOCYTE ESTERASE UR-ACNC: ABNORMAL
LYMPHOCYTES # BLD AUTO: 1.34 K/UL — SIGNIFICANT CHANGE UP (ref 1.2–3.4)
LYMPHOCYTES # BLD AUTO: 12.7 % — LOW (ref 20.5–51.1)
MAGNESIUM SERPL-MCNC: 2.1 MG/DL — SIGNIFICANT CHANGE UP (ref 1.8–2.4)
MCHC RBC-ENTMCNC: 30.7 PG — SIGNIFICANT CHANGE UP (ref 27–31)
MCHC RBC-ENTMCNC: 33.4 G/DL — SIGNIFICANT CHANGE UP (ref 32–37)
MCV RBC AUTO: 91.8 FL — SIGNIFICANT CHANGE UP (ref 81–99)
MONOCYTES # BLD AUTO: 0.6 K/UL — SIGNIFICANT CHANGE UP (ref 0.1–0.6)
MONOCYTES NFR BLD AUTO: 5.7 % — SIGNIFICANT CHANGE UP (ref 1.7–9.3)
NEUTROPHILS # BLD AUTO: 8.33 K/UL — HIGH (ref 1.4–6.5)
NEUTROPHILS NFR BLD AUTO: 79.2 % — HIGH (ref 42.2–75.2)
NITRITE UR-MCNC: NEGATIVE — SIGNIFICANT CHANGE UP
NRBC BLD AUTO-RTO: 0 /100 WBCS — SIGNIFICANT CHANGE UP (ref 0–0)
PH UR: 8.5 (ref 5–8)
PHOSPHATE SERPL-MCNC: 2.8 MG/DL — SIGNIFICANT CHANGE UP (ref 2.1–4.9)
PLATELET # BLD AUTO: 213 K/UL — SIGNIFICANT CHANGE UP (ref 130–400)
PMV BLD: 10.6 FL — HIGH (ref 7.4–10.4)
POTASSIUM SERPL-MCNC: 4 MMOL/L — SIGNIFICANT CHANGE UP (ref 3.5–5)
POTASSIUM SERPL-SCNC: 4 MMOL/L — SIGNIFICANT CHANGE UP (ref 3.5–5)
PROT SERPL-MCNC: 6.6 G/DL — SIGNIFICANT CHANGE UP (ref 6–8)
PROT UR-MCNC: 100 MG/DL
RBC # BLD: 3.19 M/UL — LOW (ref 4.2–5.4)
RBC # FLD: 16.4 % — HIGH (ref 11.5–14.5)
SODIUM SERPL-SCNC: 136 MMOL/L — SIGNIFICANT CHANGE UP (ref 135–146)
SP GR SPEC: 1.01 — SIGNIFICANT CHANGE UP (ref 1–1.03)
TROPONIN T, HIGH SENSITIVITY RESULT: 88 NG/L — CRITICAL HIGH
TROPONIN T, HIGH SENSITIVITY RESULT: 90 NG/L — CRITICAL HIGH
UROBILINOGEN FLD QL: 0.2 MG/DL — SIGNIFICANT CHANGE UP (ref 0.2–1)
WBC # BLD: 10.52 K/UL — SIGNIFICANT CHANGE UP (ref 4.8–10.8)
WBC # FLD AUTO: 10.52 K/UL — SIGNIFICANT CHANGE UP (ref 4.8–10.8)

## 2025-09-03 PROCEDURE — 36415 COLL VENOUS BLD VENIPUNCTURE: CPT

## 2025-09-03 PROCEDURE — 83036 HEMOGLOBIN GLYCOSYLATED A1C: CPT

## 2025-09-03 PROCEDURE — 82962 GLUCOSE BLOOD TEST: CPT

## 2025-09-03 PROCEDURE — 99223 1ST HOSP IP/OBS HIGH 75: CPT

## 2025-09-03 PROCEDURE — 83735 ASSAY OF MAGNESIUM: CPT

## 2025-09-03 PROCEDURE — 85025 COMPLETE CBC W/AUTO DIFF WBC: CPT

## 2025-09-03 PROCEDURE — 90935 HEMODIALYSIS ONE EVALUATION: CPT

## 2025-09-03 PROCEDURE — 84100 ASSAY OF PHOSPHORUS: CPT

## 2025-09-03 PROCEDURE — 93010 ELECTROCARDIOGRAM REPORT: CPT

## 2025-09-03 PROCEDURE — 80053 COMPREHEN METABOLIC PANEL: CPT

## 2025-09-03 RX ORDER — NIFEDIPINE 30 MG
90 TABLET, EXTENDED RELEASE 24 HR ORAL DAILY
Refills: 0 | Status: DISCONTINUED | OUTPATIENT
Start: 2025-09-03 | End: 2025-09-04

## 2025-09-03 RX ORDER — DEXTROSE 50 % IN WATER 50 %
25 SYRINGE (ML) INTRAVENOUS ONCE
Refills: 0 | Status: DISCONTINUED | OUTPATIENT
Start: 2025-09-03 | End: 2025-09-05

## 2025-09-03 RX ORDER — CARVEDILOL 3.12 MG/1
6.25 TABLET, FILM COATED ORAL EVERY 12 HOURS
Refills: 0 | Status: DISCONTINUED | OUTPATIENT
Start: 2025-09-03 | End: 2025-09-05

## 2025-09-03 RX ORDER — HEPARIN SODIUM 1000 [USP'U]/ML
5000 INJECTION INTRAVENOUS; SUBCUTANEOUS EVERY 12 HOURS
Refills: 0 | Status: DISCONTINUED | OUTPATIENT
Start: 2025-09-03 | End: 2025-09-05

## 2025-09-03 RX ORDER — ATORVASTATIN CALCIUM 80 MG/1
40 TABLET, FILM COATED ORAL AT BEDTIME
Refills: 0 | Status: DISCONTINUED | OUTPATIENT
Start: 2025-09-03 | End: 2025-09-05

## 2025-09-03 RX ORDER — BUMETANIDE 1 MG/1
1 TABLET ORAL
Refills: 0 | Status: DISCONTINUED | OUTPATIENT
Start: 2025-09-03 | End: 2025-09-05

## 2025-09-03 RX ORDER — DEXTROSE 50 % IN WATER 50 %
12.5 SYRINGE (ML) INTRAVENOUS ONCE
Refills: 0 | Status: DISCONTINUED | OUTPATIENT
Start: 2025-09-03 | End: 2025-09-05

## 2025-09-03 RX ORDER — GLUCAGON 3 MG/1
1 POWDER NASAL ONCE
Refills: 0 | Status: DISCONTINUED | OUTPATIENT
Start: 2025-09-03 | End: 2025-09-05

## 2025-09-03 RX ORDER — DEXTROSE 50 % IN WATER 50 %
15 SYRINGE (ML) INTRAVENOUS ONCE
Refills: 0 | Status: DISCONTINUED | OUTPATIENT
Start: 2025-09-03 | End: 2025-09-05

## 2025-09-03 RX ORDER — ASPIRIN 325 MG
81 TABLET ORAL DAILY
Refills: 0 | Status: DISCONTINUED | OUTPATIENT
Start: 2025-09-03 | End: 2025-09-05

## 2025-09-03 RX ORDER — CLOPIDOGREL BISULFATE 75 MG/1
75 TABLET, FILM COATED ORAL DAILY
Refills: 0 | Status: DISCONTINUED | OUTPATIENT
Start: 2025-09-03 | End: 2025-09-05

## 2025-09-03 RX ORDER — SODIUM CHLORIDE 9 G/1000ML
1000 INJECTION, SOLUTION INTRAVENOUS
Refills: 0 | Status: DISCONTINUED | OUTPATIENT
Start: 2025-09-03 | End: 2025-09-05

## 2025-09-03 RX ORDER — INSULIN LISPRO 100 U/ML
INJECTION, SOLUTION INTRAVENOUS; SUBCUTANEOUS
Refills: 0 | Status: DISCONTINUED | OUTPATIENT
Start: 2025-09-03 | End: 2025-09-05

## 2025-09-03 RX ORDER — INSULIN LISPRO 100 U/ML
8 INJECTION, SOLUTION INTRAVENOUS; SUBCUTANEOUS
Refills: 0 | Status: DISCONTINUED | OUTPATIENT
Start: 2025-09-03 | End: 2025-09-05

## 2025-09-03 RX ORDER — INSULIN GLARGINE-YFGN 100 [IU]/ML
24 INJECTION, SOLUTION SUBCUTANEOUS EVERY MORNING
Refills: 0 | Status: DISCONTINUED | OUTPATIENT
Start: 2025-09-03 | End: 2025-09-05

## 2025-09-03 RX ADMIN — ATORVASTATIN CALCIUM 40 MILLIGRAM(S): 80 TABLET, FILM COATED ORAL at 21:39

## 2025-09-03 RX ADMIN — Medication 1334 MILLIGRAM(S): at 18:01

## 2025-09-03 RX ADMIN — CARVEDILOL 6.25 MILLIGRAM(S): 3.12 TABLET, FILM COATED ORAL at 18:01

## 2025-09-03 RX ADMIN — Medication 1334 MILLIGRAM(S): at 12:50

## 2025-09-03 RX ADMIN — INSULIN LISPRO 1: 100 INJECTION, SOLUTION INTRAVENOUS; SUBCUTANEOUS at 12:48

## 2025-09-03 RX ADMIN — CLOPIDOGREL BISULFATE 75 MILLIGRAM(S): 75 TABLET, FILM COATED ORAL at 12:48

## 2025-09-03 RX ADMIN — HEPARIN SODIUM 5000 UNIT(S): 1000 INJECTION INTRAVENOUS; SUBCUTANEOUS at 18:02

## 2025-09-03 RX ADMIN — Medication 81 MILLIGRAM(S): at 12:49

## 2025-09-03 RX ADMIN — INSULIN GLARGINE-YFGN 24 UNIT(S): 100 INJECTION, SOLUTION SUBCUTANEOUS at 15:05

## 2025-09-03 RX ADMIN — BUMETANIDE 1 MILLIGRAM(S): 1 TABLET ORAL at 15:05

## 2025-09-03 RX ADMIN — INSULIN LISPRO 8 UNIT(S): 100 INJECTION, SOLUTION INTRAVENOUS; SUBCUTANEOUS at 12:48

## 2025-09-04 ENCOUNTER — TRANSCRIPTION ENCOUNTER (OUTPATIENT)
Age: 70
End: 2025-09-04

## 2025-09-04 LAB
A1C WITH ESTIMATED AVERAGE GLUCOSE RESULT: 6.2 % — HIGH (ref 4–5.6)
ALBUMIN SERPL ELPH-MCNC: 3.7 G/DL — SIGNIFICANT CHANGE UP (ref 3.5–5.2)
ALP SERPL-CCNC: 59 U/L — SIGNIFICANT CHANGE UP (ref 30–115)
ALT FLD-CCNC: 15 U/L — SIGNIFICANT CHANGE UP (ref 0–41)
ANION GAP SERPL CALC-SCNC: 12 MMOL/L — SIGNIFICANT CHANGE UP (ref 7–14)
AST SERPL-CCNC: 17 U/L — SIGNIFICANT CHANGE UP (ref 0–41)
BASOPHILS # BLD AUTO: 0.05 K/UL — SIGNIFICANT CHANGE UP (ref 0–0.2)
BASOPHILS NFR BLD AUTO: 0.6 % — SIGNIFICANT CHANGE UP (ref 0–1)
BILIRUB SERPL-MCNC: 0.3 MG/DL — SIGNIFICANT CHANGE UP (ref 0.2–1.2)
BUN SERPL-MCNC: 43 MG/DL — HIGH (ref 10–20)
CALCIUM SERPL-MCNC: 8.7 MG/DL — SIGNIFICANT CHANGE UP (ref 8.4–10.5)
CHLORIDE SERPL-SCNC: 101 MMOL/L — SIGNIFICANT CHANGE UP (ref 98–110)
CO2 SERPL-SCNC: 27 MMOL/L — SIGNIFICANT CHANGE UP (ref 17–32)
CREAT SERPL-MCNC: 5.7 MG/DL — CRITICAL HIGH (ref 0.7–1.5)
EGFR: 8 ML/MIN/1.73M2 — LOW
EGFR: 8 ML/MIN/1.73M2 — LOW
EOSINOPHIL # BLD AUTO: 0.26 K/UL — SIGNIFICANT CHANGE UP (ref 0–0.7)
EOSINOPHIL NFR BLD AUTO: 3.2 % — SIGNIFICANT CHANGE UP (ref 0–8)
ESTIMATED AVERAGE GLUCOSE: 131 MG/DL — HIGH (ref 68–114)
GLUCOSE BLDC GLUCOMTR-MCNC: 108 MG/DL — HIGH (ref 70–99)
GLUCOSE BLDC GLUCOMTR-MCNC: 114 MG/DL — HIGH (ref 70–99)
GLUCOSE BLDC GLUCOMTR-MCNC: 185 MG/DL — HIGH (ref 70–99)
GLUCOSE BLDC GLUCOMTR-MCNC: 99 MG/DL — SIGNIFICANT CHANGE UP (ref 70–99)
GLUCOSE SERPL-MCNC: 86 MG/DL — SIGNIFICANT CHANGE UP (ref 70–99)
HCT VFR BLD CALC: 29.3 % — LOW (ref 37–47)
HGB BLD-MCNC: 9.5 G/DL — LOW (ref 12–16)
IMM GRANULOCYTES NFR BLD AUTO: 0.4 % — HIGH (ref 0.1–0.3)
LYMPHOCYTES # BLD AUTO: 1.62 K/UL — SIGNIFICANT CHANGE UP (ref 1.2–3.4)
LYMPHOCYTES # BLD AUTO: 19.9 % — LOW (ref 20.5–51.1)
MAGNESIUM SERPL-MCNC: 2.4 MG/DL — SIGNIFICANT CHANGE UP (ref 1.8–2.4)
MCHC RBC-ENTMCNC: 30.5 PG — SIGNIFICANT CHANGE UP (ref 27–31)
MCHC RBC-ENTMCNC: 32.4 G/DL — SIGNIFICANT CHANGE UP (ref 32–37)
MCV RBC AUTO: 94.2 FL — SIGNIFICANT CHANGE UP (ref 81–99)
MONOCYTES # BLD AUTO: 0.68 K/UL — HIGH (ref 0.1–0.6)
MONOCYTES NFR BLD AUTO: 8.3 % — SIGNIFICANT CHANGE UP (ref 1.7–9.3)
NEUTROPHILS # BLD AUTO: 5.51 K/UL — SIGNIFICANT CHANGE UP (ref 1.4–6.5)
NEUTROPHILS NFR BLD AUTO: 67.6 % — SIGNIFICANT CHANGE UP (ref 42.2–75.2)
NRBC BLD AUTO-RTO: 0 /100 WBCS — SIGNIFICANT CHANGE UP (ref 0–0)
PHOSPHATE SERPL-MCNC: 4.2 MG/DL — SIGNIFICANT CHANGE UP (ref 2.1–4.9)
PLATELET # BLD AUTO: 236 K/UL — SIGNIFICANT CHANGE UP (ref 130–400)
PMV BLD: 11.3 FL — HIGH (ref 7.4–10.4)
POTASSIUM SERPL-MCNC: 4.3 MMOL/L — SIGNIFICANT CHANGE UP (ref 3.5–5)
POTASSIUM SERPL-SCNC: 4.3 MMOL/L — SIGNIFICANT CHANGE UP (ref 3.5–5)
PROT SERPL-MCNC: 6.7 G/DL — SIGNIFICANT CHANGE UP (ref 6–8)
RBC # BLD: 3.11 M/UL — LOW (ref 4.2–5.4)
RBC # FLD: 17 % — HIGH (ref 11.5–14.5)
SODIUM SERPL-SCNC: 140 MMOL/L — SIGNIFICANT CHANGE UP (ref 135–146)
WBC # BLD: 8.15 K/UL — SIGNIFICANT CHANGE UP (ref 4.8–10.8)
WBC # FLD AUTO: 8.15 K/UL — SIGNIFICANT CHANGE UP (ref 4.8–10.8)

## 2025-09-04 PROCEDURE — 99232 SBSQ HOSP IP/OBS MODERATE 35: CPT

## 2025-09-04 RX ORDER — NIFEDIPINE 30 MG
90 TABLET, EXTENDED RELEASE 24 HR ORAL DAILY
Refills: 0 | Status: DISCONTINUED | OUTPATIENT
Start: 2025-09-05 | End: 2025-09-05

## 2025-09-04 RX ORDER — INFLUENZA A VIRUS A/IDAHO/07/2018 (H1N1) ANTIGEN (MDCK CELL DERIVED, PROPIOLACTONE INACTIVATED, INFLUENZA A VIRUS A/INDIANA/08/2018 (H3N2) ANTIGEN (MDCK CELL DERIVED, PROPIOLACTONE INACTIVATED), INFLUENZA B VIRUS B/SINGAPORE/INFTT-16-0610/2016 ANTIGEN (MDCK CELL DERIVED, PROPIOLACTONE INACTIVATED), INFLUENZA B VIRUS B/IOWA/06/2017 ANTIGEN (MDCK CELL DERIVED, PROPIOLACTONE INACTIVATED) 15; 15; 15; 15 UG/.5ML; UG/.5ML; UG/.5ML; UG/.5ML
0.5 INJECTION, SUSPENSION INTRAMUSCULAR ONCE
Refills: 0 | Status: DISCONTINUED | OUTPATIENT
Start: 2025-09-04 | End: 2025-09-05

## 2025-09-04 RX ADMIN — CARVEDILOL 6.25 MILLIGRAM(S): 3.12 TABLET, FILM COATED ORAL at 18:19

## 2025-09-04 RX ADMIN — INSULIN LISPRO 8 UNIT(S): 100 INJECTION, SOLUTION INTRAVENOUS; SUBCUTANEOUS at 12:13

## 2025-09-04 RX ADMIN — CLOPIDOGREL BISULFATE 75 MILLIGRAM(S): 75 TABLET, FILM COATED ORAL at 12:14

## 2025-09-04 RX ADMIN — BUMETANIDE 1 MILLIGRAM(S): 1 TABLET ORAL at 18:19

## 2025-09-04 RX ADMIN — HEPARIN SODIUM 5000 UNIT(S): 1000 INJECTION INTRAVENOUS; SUBCUTANEOUS at 06:31

## 2025-09-04 RX ADMIN — CARVEDILOL 6.25 MILLIGRAM(S): 3.12 TABLET, FILM COATED ORAL at 06:29

## 2025-09-04 RX ADMIN — ATORVASTATIN CALCIUM 40 MILLIGRAM(S): 80 TABLET, FILM COATED ORAL at 21:28

## 2025-09-04 RX ADMIN — INSULIN LISPRO 8 UNIT(S): 100 INJECTION, SOLUTION INTRAVENOUS; SUBCUTANEOUS at 08:14

## 2025-09-04 RX ADMIN — Medication 1 APPLICATION(S): at 06:31

## 2025-09-04 RX ADMIN — Medication 81 MILLIGRAM(S): at 12:14

## 2025-09-04 RX ADMIN — BUMETANIDE 1 MILLIGRAM(S): 1 TABLET ORAL at 06:29

## 2025-09-04 RX ADMIN — HEPARIN SODIUM 5000 UNIT(S): 1000 INJECTION INTRAVENOUS; SUBCUTANEOUS at 18:19

## 2025-09-04 RX ADMIN — INSULIN GLARGINE-YFGN 24 UNIT(S): 100 INJECTION, SOLUTION SUBCUTANEOUS at 08:38

## 2025-09-04 RX ADMIN — Medication 90 MILLIGRAM(S): at 06:30

## 2025-09-05 ENCOUNTER — TRANSCRIPTION ENCOUNTER (OUTPATIENT)
Age: 70
End: 2025-09-05

## 2025-09-05 VITALS
TEMPERATURE: 98 F | SYSTOLIC BLOOD PRESSURE: 135 MMHG | DIASTOLIC BLOOD PRESSURE: 64 MMHG | RESPIRATION RATE: 18 BRPM | HEART RATE: 80 BPM

## 2025-09-05 LAB
GLUCOSE BLDC GLUCOMTR-MCNC: 88 MG/DL — SIGNIFICANT CHANGE UP (ref 70–99)
GLUCOSE BLDC GLUCOMTR-MCNC: 96 MG/DL — SIGNIFICANT CHANGE UP (ref 70–99)

## 2025-09-05 PROCEDURE — 99239 HOSP IP/OBS DSCHRG MGMT >30: CPT

## 2025-09-05 RX ADMIN — HEPARIN SODIUM 5000 UNIT(S): 1000 INJECTION INTRAVENOUS; SUBCUTANEOUS at 06:01

## 2025-09-05 RX ADMIN — BUMETANIDE 1 MILLIGRAM(S): 1 TABLET ORAL at 06:00

## 2025-09-05 RX ADMIN — INSULIN LISPRO 8 UNIT(S): 100 INJECTION, SOLUTION INTRAVENOUS; SUBCUTANEOUS at 08:08

## 2025-09-05 RX ADMIN — Medication 1 APPLICATION(S): at 06:03

## 2025-09-05 RX ADMIN — INSULIN LISPRO 8 UNIT(S): 100 INJECTION, SOLUTION INTRAVENOUS; SUBCUTANEOUS at 12:15

## 2025-09-05 RX ADMIN — Medication 90 MILLIGRAM(S): at 06:01

## 2025-09-05 RX ADMIN — INSULIN GLARGINE-YFGN 24 UNIT(S): 100 INJECTION, SOLUTION SUBCUTANEOUS at 08:08

## 2025-09-05 RX ADMIN — Medication 81 MILLIGRAM(S): at 12:14

## 2025-09-05 RX ADMIN — CLOPIDOGREL BISULFATE 75 MILLIGRAM(S): 75 TABLET, FILM COATED ORAL at 12:15

## 2025-09-05 RX ADMIN — CARVEDILOL 6.25 MILLIGRAM(S): 3.12 TABLET, FILM COATED ORAL at 06:01

## 2025-09-19 ENCOUNTER — TRANSCRIPTION ENCOUNTER (OUTPATIENT)
Age: 70
End: 2025-09-19

## 2025-09-19 RX ORDER — CLOPIDOGREL BISULFATE 75 MG/1
1 TABLET, FILM COATED ORAL
Refills: 0 | DISCHARGE